# Patient Record
Sex: MALE | Race: WHITE | NOT HISPANIC OR LATINO | Employment: OTHER | URBAN - METROPOLITAN AREA
[De-identification: names, ages, dates, MRNs, and addresses within clinical notes are randomized per-mention and may not be internally consistent; named-entity substitution may affect disease eponyms.]

---

## 2023-07-26 DIAGNOSIS — N40.1 BENIGN PROSTATIC HYPERPLASIA WITH URINARY FREQUENCY: Primary | ICD-10-CM

## 2023-07-26 DIAGNOSIS — R35.0 BENIGN PROSTATIC HYPERPLASIA WITH URINARY FREQUENCY: Primary | ICD-10-CM

## 2023-07-27 RX ORDER — TAMSULOSIN HYDROCHLORIDE 0.4 MG/1
0.4 CAPSULE ORAL 2 TIMES DAILY
Qty: 180 CAPSULE | Refills: 3 | Status: SHIPPED | OUTPATIENT
Start: 2023-07-27

## 2023-08-28 PROBLEM — E87.5 HYPERKALEMIA: Status: ACTIVE | Noted: 2023-08-28

## 2023-08-28 PROBLEM — N39.0 ACUTE UTI: Status: ACTIVE | Noted: 2023-08-28

## 2023-08-28 PROBLEM — I48.91 ATRIAL FIBRILLATION (MULTI): Status: ACTIVE | Noted: 2023-08-28

## 2023-08-28 PROBLEM — J01.90 ACUTE SINUSITIS: Status: ACTIVE | Noted: 2023-08-28

## 2023-08-28 PROBLEM — M06.9 RHEUMATOID ARTHRITIS (MULTI): Status: ACTIVE | Noted: 2023-08-28

## 2023-08-28 PROBLEM — M54.31 RIGHT SCIATIC NERVE PAIN: Status: ACTIVE | Noted: 2023-08-28

## 2023-08-28 PROBLEM — C44.91 BASAL CELL CARCINOMA: Status: ACTIVE | Noted: 2023-08-28

## 2023-08-28 PROBLEM — N18.31 CKD STAGE G3A/A1, GFR 45-59 AND ALBUMIN CREATININE RATIO <30 MG/G (MULTI): Status: ACTIVE | Noted: 2023-08-28

## 2023-08-28 PROBLEM — D64.9 ANEMIA: Status: ACTIVE | Noted: 2023-08-28

## 2023-08-28 PROBLEM — N40.1 BENIGN PROSTATIC HYPERPLASIA WITH LOWER URINARY TRACT SYMPTOMS: Status: ACTIVE | Noted: 2023-08-28

## 2023-08-28 PROBLEM — E66.9 OBESITY: Status: ACTIVE | Noted: 2023-08-28

## 2023-08-28 PROBLEM — N28.9 IMPAIRED RENAL FUNCTION: Status: ACTIVE | Noted: 2023-08-28

## 2023-08-28 PROBLEM — H61.21 IMPACTED CERUMEN OF RIGHT EAR: Status: ACTIVE | Noted: 2023-08-28

## 2023-08-28 PROBLEM — I10 BENIGN ESSENTIAL HYPERTENSION: Status: ACTIVE | Noted: 2023-08-28

## 2023-08-28 PROBLEM — L02.31 ABSCESS OF BUTTOCK: Status: ACTIVE | Noted: 2023-08-28

## 2023-08-28 PROBLEM — J06.9 UPPER RESPIRATORY INFECTION: Status: ACTIVE | Noted: 2023-08-28

## 2023-08-28 PROBLEM — N40.0 BPH WITHOUT URINARY OBSTRUCTION: Status: ACTIVE | Noted: 2023-08-28

## 2023-08-28 PROBLEM — M25.511 RIGHT SHOULDER PAIN: Status: ACTIVE | Noted: 2023-08-28

## 2023-08-28 PROBLEM — R68.89 FLU-LIKE SYMPTOMS: Status: ACTIVE | Noted: 2023-08-28

## 2023-08-28 PROBLEM — E78.5 HYPERLIPIDEMIA: Status: ACTIVE | Noted: 2023-08-28

## 2023-08-28 PROBLEM — H92.01 RIGHT EAR PAIN: Status: ACTIVE | Noted: 2023-08-28

## 2023-08-28 PROBLEM — R53.83 FATIGUE: Status: ACTIVE | Noted: 2023-08-28

## 2023-08-28 PROBLEM — K64.8 BLEEDING INTERNAL HEMORRHOIDS: Status: ACTIVE | Noted: 2023-08-28

## 2023-08-28 RX ORDER — WHEAT DEXTRIN 3 G/3.5 G
2 POWDER IN PACKET (EA) ORAL DAILY
COMMUNITY
Start: 2017-06-20

## 2023-08-28 RX ORDER — PREDNISONE 1 MG/1
4 TABLET ORAL DAILY
COMMUNITY
Start: 2023-07-06

## 2023-08-28 RX ORDER — TRAMADOL HYDROCHLORIDE 50 MG/1
50 TABLET ORAL 2 TIMES DAILY PRN
COMMUNITY

## 2023-08-28 RX ORDER — METOPROLOL SUCCINATE 25 MG/1
75 TABLET, EXTENDED RELEASE ORAL 2 TIMES DAILY
COMMUNITY
Start: 2018-01-16

## 2023-08-28 RX ORDER — CHOLECALCIFEROL (VITAMIN D3) 25 MCG
1000 TABLET ORAL DAILY
COMMUNITY
Start: 2017-02-20

## 2023-08-28 RX ORDER — MECLIZINE HYDROCHLORIDE 25 MG/1
25 TABLET ORAL 2 TIMES DAILY PRN
COMMUNITY
Start: 2023-07-06

## 2023-08-28 RX ORDER — ATORVASTATIN CALCIUM 20 MG/1
1 TABLET, FILM COATED ORAL DAILY
COMMUNITY
Start: 2012-08-14 | End: 2023-12-04

## 2023-08-28 RX ORDER — PREDNISONE 2.5 MG/1
1 TABLET ORAL EVERY OTHER DAY
COMMUNITY
Start: 2013-12-20 | End: 2024-01-09 | Stop reason: ALTCHOICE

## 2023-08-28 RX ORDER — METHOTREXATE 2.5 MG/1
6 TABLET ORAL
COMMUNITY
Start: 2012-07-03

## 2023-08-28 RX ORDER — APIXABAN 5 MG/1
5 TABLET, FILM COATED ORAL 2 TIMES DAILY
COMMUNITY
Start: 2018-01-16

## 2023-08-28 RX ORDER — FOLIC ACID 1 MG/1
1 TABLET ORAL DAILY
COMMUNITY
Start: 2018-07-20

## 2023-08-28 RX ORDER — LATANOPROST 50 UG/ML
1 SOLUTION/ DROPS OPHTHALMIC NIGHTLY
COMMUNITY
Start: 2018-02-13

## 2023-08-28 RX ORDER — ENALAPRIL MALEATE 10 MG/1
1 TABLET ORAL DAILY
COMMUNITY
Start: 2014-12-03 | End: 2024-02-01

## 2023-08-28 RX ORDER — ACETAMINOPHEN 500 MG
1000 TABLET ORAL 2 TIMES DAILY
COMMUNITY
Start: 2017-02-20

## 2023-08-29 ENCOUNTER — OFFICE VISIT (OUTPATIENT)
Dept: PRIMARY CARE | Facility: CLINIC | Age: 83
End: 2023-08-29
Payer: MEDICARE

## 2023-08-29 VITALS
HEIGHT: 64 IN | OXYGEN SATURATION: 100 % | WEIGHT: 189.6 LBS | SYSTOLIC BLOOD PRESSURE: 119 MMHG | HEART RATE: 76 BPM | RESPIRATION RATE: 16 BRPM | TEMPERATURE: 98.1 F | DIASTOLIC BLOOD PRESSURE: 82 MMHG | BODY MASS INDEX: 32.37 KG/M2

## 2023-08-29 DIAGNOSIS — Z23 NEED FOR VACCINATION WITH 20-POLYVALENT PNEUMOCOCCAL CONJUGATE VACCINE: ICD-10-CM

## 2023-08-29 DIAGNOSIS — I10 BENIGN ESSENTIAL HYPERTENSION: Primary | ICD-10-CM

## 2023-08-29 DIAGNOSIS — Z12.5 SCREENING FOR PROSTATE CANCER: ICD-10-CM

## 2023-08-29 DIAGNOSIS — N18.31 CKD STAGE G3A/A1, GFR 45-59 AND ALBUMIN CREATININE RATIO <30 MG/G (MULTI): ICD-10-CM

## 2023-08-29 DIAGNOSIS — I48.20 CHRONIC ATRIAL FIBRILLATION (MULTI): ICD-10-CM

## 2023-08-29 DIAGNOSIS — R35.0 BENIGN PROSTATIC HYPERPLASIA WITH URINARY FREQUENCY: ICD-10-CM

## 2023-08-29 DIAGNOSIS — E78.2 MIXED HYPERLIPIDEMIA: ICD-10-CM

## 2023-08-29 DIAGNOSIS — N40.1 BENIGN PROSTATIC HYPERPLASIA WITH URINARY FREQUENCY: ICD-10-CM

## 2023-08-29 DIAGNOSIS — M05.79 RHEUMATOID ARTHRITIS INVOLVING MULTIPLE SITES WITH POSITIVE RHEUMATOID FACTOR (MULTI): ICD-10-CM

## 2023-08-29 PROCEDURE — 3074F SYST BP LT 130 MM HG: CPT | Performed by: INTERNAL MEDICINE

## 2023-08-29 PROCEDURE — G0009 ADMIN PNEUMOCOCCAL VACCINE: HCPCS | Performed by: INTERNAL MEDICINE

## 2023-08-29 PROCEDURE — 90677 PCV20 VACCINE IM: CPT | Performed by: INTERNAL MEDICINE

## 2023-08-29 PROCEDURE — 99214 OFFICE O/P EST MOD 30 MIN: CPT | Performed by: INTERNAL MEDICINE

## 2023-08-29 PROCEDURE — 1159F MED LIST DOCD IN RCRD: CPT | Performed by: INTERNAL MEDICINE

## 2023-08-29 PROCEDURE — 1036F TOBACCO NON-USER: CPT | Performed by: INTERNAL MEDICINE

## 2023-08-29 PROCEDURE — 3079F DIAST BP 80-89 MM HG: CPT | Performed by: INTERNAL MEDICINE

## 2023-08-29 PROCEDURE — 1160F RVW MEDS BY RX/DR IN RCRD: CPT | Performed by: INTERNAL MEDICINE

## 2023-08-29 ASSESSMENT — ENCOUNTER SYMPTOMS
DIZZINESS: 0
OCCASIONAL FEELINGS OF UNSTEADINESS: 0
SPEECH DIFFICULTY: 0
EYE REDNESS: 0
NECK STIFFNESS: 0
BRUISES/BLEEDS EASILY: 0
TREMORS: 0
CARDIOVASCULAR NEGATIVE: 1
FREQUENCY: 0
CONSTITUTIONAL NEGATIVE: 1
WHEEZING: 0
CONSTIPATION: 0
SEIZURES: 0
BLOOD IN STOOL: 0
NERVOUS/ANXIOUS: 0
COLOR CHANGE: 0
GASTROINTESTINAL NEGATIVE: 1
COUGH: 0
SLEEP DISTURBANCE: 0
SINUS PRESSURE: 0
EYE PAIN: 0
EYES NEGATIVE: 1
HEMATOLOGIC/LYMPHATIC NEGATIVE: 1
BACK PAIN: 1
PSYCHIATRIC NEGATIVE: 1
STRIDOR: 0
ENDOCRINE NEGATIVE: 1
VOICE CHANGE: 0
LOSS OF SENSATION IN FEET: 0
ADENOPATHY: 0
HEADACHES: 0
SORE THROAT: 0
NECK PAIN: 0
SINUS PAIN: 0
DYSURIA: 0
POLYPHAGIA: 0
ALLERGIC/IMMUNOLOGIC NEGATIVE: 1
TROUBLE SWALLOWING: 0
JOINT SWELLING: 0
PALPITATIONS: 0
WOUND: 0
DIARRHEA: 0
NAUSEA: 0
APPETITE CHANGE: 0
POLYDIPSIA: 0
EYE DISCHARGE: 0
DEPRESSION: 0
VOMITING: 0
ARTHRALGIAS: 1
ACTIVITY CHANGE: 0
RESPIRATORY NEGATIVE: 1
MYALGIAS: 0
FACIAL ASYMMETRY: 0
SHORTNESS OF BREATH: 0
FLANK PAIN: 0
CHEST TIGHTNESS: 0
UNEXPECTED WEIGHT CHANGE: 0
AGITATION: 0
CONFUSION: 0
ABDOMINAL PAIN: 0
NUMBNESS: 0
DIFFICULTY URINATING: 0
LIGHT-HEADEDNESS: 0
HALLUCINATIONS: 0
NEUROLOGICAL NEGATIVE: 1
WEAKNESS: 0

## 2023-08-29 ASSESSMENT — PATIENT HEALTH QUESTIONNAIRE - PHQ9
SUM OF ALL RESPONSES TO PHQ9 QUESTIONS 1 AND 2: 0
2. FEELING DOWN, DEPRESSED OR HOPELESS: NOT AT ALL
1. LITTLE INTEREST OR PLEASURE IN DOING THINGS: NOT AT ALL

## 2023-08-29 NOTE — PROGRESS NOTES
Subjective   Patient ID: Joce Galeana is a 82 y.o. male who presents for Follow-up (Patient is here for a follow up./No new concerns. ).  HPI    Patient has been feeling pretty good and has been complaint with prescribed medications.    We reviewed and discussed details of recent blood work: CBC, CMP, TSH, Lipid panel done in Feb 2023.  Results within acceptable range.    Follows with rheumatology Dr. Riley.  He remains independent and active at home and with family.  Lives with hs wife.  Noticed that he gets tired more easily but no significant complaints, denies CP, SOB.  Occasionally uses cane, not ready for a walker.    Follows with cardiologist at [facility], no changes in his medications recently.           Review of Systems   Constitutional: Negative.  Negative for activity change, appetite change and unexpected weight change.   HENT: Negative.  Negative for congestion, ear discharge, ear pain, hearing loss, mouth sores, nosebleeds, sinus pressure, sinus pain, sore throat, trouble swallowing and voice change.    Eyes: Negative.  Negative for pain, discharge, redness and visual disturbance.   Respiratory: Negative.  Negative for cough, chest tightness, shortness of breath, wheezing and stridor.    Cardiovascular: Negative.  Negative for chest pain, palpitations and leg swelling.   Gastrointestinal: Negative.  Negative for abdominal pain, blood in stool, constipation, diarrhea, nausea and vomiting.   Endocrine: Negative.  Negative for polydipsia, polyphagia and polyuria.   Genitourinary: Negative.  Negative for difficulty urinating, dysuria, flank pain, frequency and urgency.   Musculoskeletal:  Positive for arthralgias and back pain. Negative for gait problem, joint swelling, myalgias, neck pain and neck stiffness.   Skin: Negative.  Negative for color change, rash and wound.   Allergic/Immunologic: Negative.  Negative for environmental allergies, food allergies and immunocompromised state.   Neurological: Negative.  " Negative for dizziness, tremors, seizures, syncope, facial asymmetry, speech difficulty, weakness, light-headedness, numbness and headaches.   Hematological: Negative.  Negative for adenopathy. Does not bruise/bleed easily.   Psychiatric/Behavioral: Negative.  Negative for agitation, behavioral problems, confusion, hallucinations, sleep disturbance and suicidal ideas. The patient is not nervous/anxious.    All other systems reviewed and are negative.      Objective     Review of systems was performed and all systems were negative except what in HPI    /82   Pulse 76   Temp 36.7 °C (98.1 °F)   Resp 16   Ht 1.626 m (5' 4\")   Wt 86 kg (189 lb 9.6 oz)   SpO2 100%   BMI 32.54 kg/m²    Physical Exam  Vitals and nursing note reviewed.   Constitutional:       General: He is not in acute distress.     Appearance: Normal appearance.   HENT:      Head: Normocephalic and atraumatic.      Right Ear: External ear normal.      Left Ear: External ear normal.      Nose: Nose normal. No congestion or rhinorrhea.   Eyes:      General:         Right eye: No discharge.         Left eye: No discharge.      Extraocular Movements: Extraocular movements intact.      Conjunctiva/sclera: Conjunctivae normal.      Pupils: Pupils are equal, round, and reactive to light.   Cardiovascular:      Rate and Rhythm: Normal rate and regular rhythm.      Pulses: Normal pulses.      Heart sounds: Normal heart sounds. No murmur heard.     No friction rub. No gallop.   Pulmonary:      Effort: Pulmonary effort is normal. No respiratory distress.      Breath sounds: Normal breath sounds. No stridor. No wheezing, rhonchi or rales.   Chest:      Chest wall: No tenderness.   Abdominal:      General: Bowel sounds are normal.      Palpations: Abdomen is soft. There is no mass.      Tenderness: There is no abdominal tenderness. There is no guarding or rebound.   Musculoskeletal:         General: No swelling or deformity. Normal range of motion.      " Cervical back: Normal range of motion and neck supple. No rigidity or tenderness.      Right lower leg: No edema.      Left lower leg: No edema.   Lymphadenopathy:      Cervical: No cervical adenopathy.   Skin:     General: Skin is warm and dry.      Coloration: Skin is not jaundiced.      Findings: No bruising or erythema.   Neurological:      General: No focal deficit present.      Mental Status: He is alert and oriented to person, place, and time. Mental status is at baseline.      Cranial Nerves: No cranial nerve deficit.      Motor: No weakness.      Coordination: Coordination normal.      Gait: Gait normal.   Psychiatric:         Mood and Affect: Mood normal.         Behavior: Behavior normal.         Thought Content: Thought content normal.         Judgment: Judgment normal.         Assessment/Plan   Problem List Items Addressed This Visit          Cardiac and Vasculature    Atrial fibrillation (CMS/HCC)     Clinically stable. F/up with cardiology.          Relevant Medications    metoprolol succinate XL (Toprol-XL) 25 mg 24 hr tablet    Other Relevant Orders    CBC    TSH with reflex to Free T4 if abnormal    Benign essential hypertension - Primary     Controlled. Continue current treatment.          Hyperlipidemia     Continue statin.          Relevant Orders    Comprehensive Metabolic Panel    Lipid Panel       Genitourinary and Reproductive    Benign prostatic hyperplasia with lower urinary tract symptoms     Clinically stable. Continue Tamsulosin.         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 Will monitor.             Multi-system (Lupus, Sarcoid)    Rheumatoid arthritis (CMS/HCC)     Clinically stable. F/up with rheumatology Dr. Riley.           Other Visit Diagnoses       Need for vaccination with 20-polyvalent pneumococcal conjugate vaccine        Relevant Orders    Pneumococcal conjugate vaccine, 20-valent, adult (PREVNAR 20) (Completed)     Screening for prostate cancer        Relevant Orders    Prostate Specific Antigen, Screen        It was a pleasure to see you today.  I would like to remind you about importance of a healthy lifestyle in order to improve your well-being and live longer.  Try to engage in physical activities for at least 150 minutes per week.  Eat about 10 servings of fruits and vegetables daily. My advice is 2 servings of fruits and 8 servings of vegetables.  For vegetables choose at least half of them green and at least half of them fresh.  Please avoid sugar, salt, fried food and saturated fat.    I spent a total of 30 minutes on the date of service for follow up visit, which included preparing to see the patient, face-to-face patient care, completing clinical documentation, obtaining and/or reviewing separately obtained history, performing a medically appropriate examination, counseling and educating the patient/family/caregiver, ordering medications, tests, or procedures, communicating with other health care providers (not separately reported), independently interpreting results (not separately reported), communicating results to the patient/family/caregiver, and care coordination (not separately reported).      F/up in 6 months or sooner if needed.

## 2023-12-01 DIAGNOSIS — E78.2 MIXED HYPERLIPIDEMIA: Primary | ICD-10-CM

## 2023-12-04 RX ORDER — ATORVASTATIN CALCIUM 20 MG/1
20 TABLET, FILM COATED ORAL DAILY
Qty: 90 TABLET | Refills: 3 | Status: SHIPPED | OUTPATIENT
Start: 2023-12-04

## 2024-01-05 ENCOUNTER — TELEPHONE (OUTPATIENT)
Dept: PRIMARY CARE | Facility: CLINIC | Age: 84
End: 2024-01-05
Payer: MEDICARE

## 2024-01-05 NOTE — TELEPHONE ENCOUNTER
Patient states he noticed blood in his urine which started yesterday. Would like to know if you could refer him to a urologist or should he be seen by you first or go to .     Asked patient if it was alot or little blood in his urine and his answer was well I can see the blood in my urine.   Patient would like your advice.

## 2024-01-05 NOTE — TELEPHONE ENCOUNTER
chivo GUTIERREZ refused to go to ER and scheduled an appointment with Dr. Milligan on 1/9/24 at 8:00

## 2024-01-09 ENCOUNTER — OFFICE VISIT (OUTPATIENT)
Dept: PRIMARY CARE | Facility: CLINIC | Age: 84
End: 2024-01-09
Payer: MEDICARE

## 2024-01-09 ENCOUNTER — LAB (OUTPATIENT)
Dept: LAB | Facility: LAB | Age: 84
End: 2024-01-09
Payer: MEDICARE

## 2024-01-09 VITALS
SYSTOLIC BLOOD PRESSURE: 129 MMHG | HEIGHT: 64 IN | WEIGHT: 194.4 LBS | OXYGEN SATURATION: 100 % | BODY MASS INDEX: 33.19 KG/M2 | HEART RATE: 61 BPM | RESPIRATION RATE: 16 BRPM | DIASTOLIC BLOOD PRESSURE: 83 MMHG | TEMPERATURE: 97.3 F

## 2024-01-09 DIAGNOSIS — R31.0 GROSS HEMATURIA: Primary | ICD-10-CM

## 2024-01-09 DIAGNOSIS — I48.20 CHRONIC ATRIAL FIBRILLATION (MULTI): ICD-10-CM

## 2024-01-09 DIAGNOSIS — E78.2 MIXED HYPERLIPIDEMIA: ICD-10-CM

## 2024-01-09 DIAGNOSIS — Z12.5 SCREENING FOR PROSTATE CANCER: ICD-10-CM

## 2024-01-09 DIAGNOSIS — M05.79 RHEUMATOID ARTHRITIS INVOLVING MULTIPLE SITES WITH POSITIVE RHEUMATOID FACTOR (MULTI): ICD-10-CM

## 2024-01-09 DIAGNOSIS — N18.31 CKD STAGE G3A/A1, GFR 45-59 AND ALBUMIN CREATININE RATIO <30 MG/G (MULTI): ICD-10-CM

## 2024-01-09 DIAGNOSIS — N39.0 URINARY TRACT INFECTION WITHOUT HEMATURIA, SITE UNSPECIFIED: ICD-10-CM

## 2024-01-09 DIAGNOSIS — I10 BENIGN ESSENTIAL HYPERTENSION: ICD-10-CM

## 2024-01-09 LAB
APPEARANCE UR: ABNORMAL
BILIRUB UR STRIP.AUTO-MCNC: NEGATIVE MG/DL
COLOR UR: YELLOW
GLUCOSE UR STRIP.AUTO-MCNC: NEGATIVE MG/DL
KETONES UR STRIP.AUTO-MCNC: NEGATIVE MG/DL
LEUKOCYTE ESTERASE UR QL STRIP.AUTO: ABNORMAL
MUCOUS THREADS #/AREA URNS AUTO: ABNORMAL /LPF
NITRITE UR QL STRIP.AUTO: NEGATIVE
PH UR STRIP.AUTO: 5 [PH]
PROT UR STRIP.AUTO-MCNC: ABNORMAL MG/DL
RBC # UR STRIP.AUTO: ABNORMAL /UL
RBC #/AREA URNS AUTO: >20 /HPF
SP GR UR STRIP.AUTO: 1.02
UROBILINOGEN UR STRIP.AUTO-MCNC: <2 MG/DL
WBC #/AREA URNS AUTO: ABNORMAL /HPF

## 2024-01-09 PROCEDURE — 1036F TOBACCO NON-USER: CPT | Performed by: INTERNAL MEDICINE

## 2024-01-09 PROCEDURE — 87086 URINE CULTURE/COLONY COUNT: CPT

## 2024-01-09 PROCEDURE — 1159F MED LIST DOCD IN RCRD: CPT | Performed by: INTERNAL MEDICINE

## 2024-01-09 PROCEDURE — 3074F SYST BP LT 130 MM HG: CPT | Performed by: INTERNAL MEDICINE

## 2024-01-09 PROCEDURE — 1160F RVW MEDS BY RX/DR IN RCRD: CPT | Performed by: INTERNAL MEDICINE

## 2024-01-09 PROCEDURE — 81001 URINALYSIS AUTO W/SCOPE: CPT

## 2024-01-09 PROCEDURE — 99214 OFFICE O/P EST MOD 30 MIN: CPT | Performed by: INTERNAL MEDICINE

## 2024-01-09 PROCEDURE — 3079F DIAST BP 80-89 MM HG: CPT | Performed by: INTERNAL MEDICINE

## 2024-01-09 ASSESSMENT — ENCOUNTER SYMPTOMS
SINUS PAIN: 0
NECK PAIN: 0
EYE REDNESS: 0
LOSS OF SENSATION IN FEET: 0
SORE THROAT: 0
NEUROLOGICAL NEGATIVE: 1
HEMATURIA: 1
BACK PAIN: 1
HEADACHES: 0
DYSURIA: 0
GASTROINTESTINAL NEGATIVE: 1
WHEEZING: 0
SLEEP DISTURBANCE: 0
BLOOD IN STOOL: 0
RESPIRATORY NEGATIVE: 1
APPETITE CHANGE: 0
ENDOCRINE NEGATIVE: 1
EYES NEGATIVE: 1
NAUSEA: 0
NECK STIFFNESS: 0
POLYDIPSIA: 0
COUGH: 0
WOUND: 0
AGITATION: 0
MYALGIAS: 0
CONSTITUTIONAL NEGATIVE: 1
JOINT SWELLING: 0
NUMBNESS: 0
COLOR CHANGE: 0
POLYPHAGIA: 0
LIGHT-HEADEDNESS: 0
EYE PAIN: 0
HEMATOLOGIC/LYMPHATIC NEGATIVE: 1
FLANK PAIN: 0
SINUS PRESSURE: 0
NERVOUS/ANXIOUS: 0
PALPITATIONS: 0
SPEECH DIFFICULTY: 0
DEPRESSION: 0
VOMITING: 0
DIZZINESS: 0
FREQUENCY: 0
DIFFICULTY URINATING: 0
STRIDOR: 0
EYE DISCHARGE: 0
ABDOMINAL PAIN: 0
TREMORS: 0
SHORTNESS OF BREATH: 0
CONSTIPATION: 0
TROUBLE SWALLOWING: 0
HALLUCINATIONS: 0
PSYCHIATRIC NEGATIVE: 1
CONFUSION: 0
CARDIOVASCULAR NEGATIVE: 1
WEAKNESS: 0
ALLERGIC/IMMUNOLOGIC NEGATIVE: 1
ARTHRALGIAS: 1
SEIZURES: 0
BRUISES/BLEEDS EASILY: 0
FACIAL ASYMMETRY: 0
UNEXPECTED WEIGHT CHANGE: 0
OCCASIONAL FEELINGS OF UNSTEADINESS: 0
DIARRHEA: 0
VOICE CHANGE: 0
ACTIVITY CHANGE: 0
CHEST TIGHTNESS: 0
ADENOPATHY: 0

## 2024-01-09 ASSESSMENT — PATIENT HEALTH QUESTIONNAIRE - PHQ9
1. LITTLE INTEREST OR PLEASURE IN DOING THINGS: NOT AT ALL
2. FEELING DOWN, DEPRESSED OR HOPELESS: NOT AT ALL
SUM OF ALL RESPONSES TO PHQ9 QUESTIONS 1 AND 2: 0

## 2024-01-09 NOTE — PROGRESS NOTES
Subjective   Patient ID: Joce Galeana is a 83 y.o. male who presents for Blood in Urine (Patient is here for blood in his urine, did not go to ER as recommended. /Brought his own urine to take to the lab for testing. ).  Blood in Urine  Irritative symptoms do not include frequency or urgency. Pertinent negatives include no abdominal pain, dysuria, flank pain, nausea or vomiting.     82 yo patient with h/o RA, A. Fib, HTN, HLD comes for visit with complaint as above.  Patient has been feeling pretty good and has been complaint with prescribed medications.    He noted  blood in urine about a week ago which lasted 3 days and cleared. He stopped Eliquis on 4th day and has not seen blood since. He restarted Eliqius 2 days later and no more bleeding.     Denies fever, chills, no abdominal pain.  Patient is back to his regular exercise routine.     Patient had blood work done in the morning on 1/04/24 (ordered by Dr. Riley), hematuria started on 01/04/24 afternoon.  We reviewed blood work results: CBC, CMP, Vit D, ESR. Results within acceptable range.     Patient has remote h/o nephrolithiasis.     He accidentally fell from ladder in his basement (cement floor) about 6 weeks ago, hit his head, remains on Eliquis, did not seek medical attention after fall despite his family members strongly advising him to do so.   Denies any headaches, no vision changes, no focal neurological deficits.     Review of Systems   Constitutional: Negative.  Negative for activity change, appetite change and unexpected weight change.   HENT: Negative.  Negative for congestion, ear discharge, ear pain, hearing loss, mouth sores, nosebleeds, sinus pressure, sinus pain, sore throat, trouble swallowing and voice change.    Eyes: Negative.  Negative for pain, discharge, redness and visual disturbance.   Respiratory: Negative.  Negative for cough, chest tightness, shortness of breath, wheezing and stridor.    Cardiovascular: Negative.  Negative for  "chest pain, palpitations and leg swelling.   Gastrointestinal: Negative.  Negative for abdominal pain, blood in stool, constipation, diarrhea, nausea and vomiting.   Endocrine: Negative.  Negative for polydipsia, polyphagia and polyuria.   Genitourinary:  Positive for hematuria. Negative for difficulty urinating, dysuria, flank pain, frequency and urgency.   Musculoskeletal:  Positive for arthralgias and back pain. Negative for gait problem, joint swelling, myalgias, neck pain and neck stiffness.   Skin: Negative.  Negative for color change, rash and wound.   Allergic/Immunologic: Negative.  Negative for environmental allergies, food allergies and immunocompromised state.   Neurological: Negative.  Negative for dizziness, tremors, seizures, syncope, facial asymmetry, speech difficulty, weakness, light-headedness, numbness and headaches.   Hematological: Negative.  Negative for adenopathy. Does not bruise/bleed easily.   Psychiatric/Behavioral: Negative.  Negative for agitation, behavioral problems, confusion, hallucinations, sleep disturbance and suicidal ideas. The patient is not nervous/anxious.    All other systems reviewed and are negative.      Objective     Review of systems was performed and all systems were negative except what in HPI    /83   Pulse 61   Temp 36.3 °C (97.3 °F)   Resp 16   Ht 1.626 m (5' 4\")   Wt 88.2 kg (194 lb 6.4 oz)   SpO2 100%   BMI 33.37 kg/m²    Physical Exam  Vitals and nursing note reviewed.   Constitutional:       General: He is not in acute distress.     Appearance: Normal appearance.   HENT:      Head: Normocephalic and atraumatic.      Right Ear: External ear normal.      Left Ear: External ear normal.      Nose: Nose normal. No congestion or rhinorrhea.   Eyes:      General:         Right eye: No discharge.         Left eye: No discharge.      Extraocular Movements: Extraocular movements intact.      Conjunctiva/sclera: Conjunctivae normal.      Pupils: Pupils are " equal, round, and reactive to light.   Cardiovascular:      Rate and Rhythm: Normal rate and regular rhythm.      Heart sounds: Normal heart sounds. No murmur heard.     No friction rub. No gallop.   Pulmonary:      Effort: Pulmonary effort is normal. No respiratory distress.      Breath sounds: Normal breath sounds. No stridor. No wheezing, rhonchi or rales.   Chest:      Chest wall: No tenderness.   Abdominal:      General: Bowel sounds are normal.      Palpations: Abdomen is soft. There is no mass.      Tenderness: There is no abdominal tenderness. There is no guarding or rebound.   Musculoskeletal:         General: No swelling or deformity. Normal range of motion.      Cervical back: Normal range of motion and neck supple. No rigidity or tenderness.      Right lower leg: No edema.      Left lower leg: No edema.   Lymphadenopathy:      Cervical: No cervical adenopathy.   Skin:     General: Skin is warm and dry.      Coloration: Skin is not jaundiced.      Findings: No bruising or erythema.   Neurological:      General: No focal deficit present.      Mental Status: He is alert and oriented to person, place, and time. Mental status is at baseline.      Cranial Nerves: No cranial nerve deficit.      Motor: No weakness.      Coordination: Coordination normal.      Gait: Gait normal.   Psychiatric:         Mood and Affect: Mood normal.         Behavior: Behavior normal.         Thought Content: Thought content normal.         Judgment: Judgment normal.         Assessment/Plan   Problem List Items Addressed This Visit             ICD-10-CM       Cardiac and Vasculature    Atrial fibrillation (CMS/HCC) I48.91     Clinically stable. F/up with cardiology. Remains on Eliquis.         Relevant Orders    CBC    TSH with reflex to Free T4 if abnormal    Benign essential hypertension I10     Controlled. Continue current treatment.          Hyperlipidemia E78.5     Continue statin.          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 Will monitor.         Gross hematuria - Primary R31.0     Please obtain US kidneys and consult urology.         Relevant Orders    Referral to Urology    US renal complete       Multi-system (Lupus, Sarcoid)    Rheumatoid arthritis (CMS/[facility]) M06.9     Clinically stable. F/up with rheumatology.          Other Visit Diagnoses         Codes    Urinary tract infection without hematuria, site unspecified     N39.0    Relevant Orders    Urinalysis with Reflex Microscopic (Completed)    Urine Culture    Screening for prostate cancer     Z12.5    Relevant Orders    Prostate Specific Antigen, Screen          Patient was identified as a fall risk. Risk prevention instructions provided.    I spent a total of 32 minutes on the date of service for follow up visit, which included preparing to see the patient, face-to-face patient care, completing clinical documentation, obtaining and/or reviewing separately obtained history, performing a medically appropriate examination, counseling and educating the patient/family/caregiver, ordering medications, tests, or procedures, communicating with other health care providers (not separately reported), independently interpreting results (not separately reported), communicating results to the patient/family/caregiver, and care coordination (not separately reported).    F/up in 1 months or sooner if needed.

## 2024-01-10 ENCOUNTER — ANCILLARY PROCEDURE (OUTPATIENT)
Dept: RADIOLOGY | Facility: CLINIC | Age: 84
End: 2024-01-10
Payer: MEDICARE

## 2024-01-10 DIAGNOSIS — R31.0 GROSS HEMATURIA: ICD-10-CM

## 2024-01-10 LAB — BACTERIA UR CULT: NO GROWTH

## 2024-01-10 PROCEDURE — 76770 US EXAM ABDO BACK WALL COMP: CPT | Performed by: STUDENT IN AN ORGANIZED HEALTH CARE EDUCATION/TRAINING PROGRAM

## 2024-01-10 PROCEDURE — 76770 US EXAM ABDO BACK WALL COMP: CPT

## 2024-01-10 NOTE — PATIENT INSTRUCTIONS

## 2024-01-12 ENCOUNTER — TELEPHONE (OUTPATIENT)
Dept: PRIMARY CARE | Facility: CLINIC | Age: 84
End: 2024-01-12
Payer: MEDICARE

## 2024-01-12 NOTE — TELEPHONE ENCOUNTER
----- Message from Zainab Henning MD PhD sent at 1/11/2024 11:00 PM EST -----  Please call the patient regarding his abnormal result.  Recent ultrasound showed kidney stones in left kidney up to 1.4 cm in size. Please consult urology as discussed during office visit.

## 2024-01-15 ENCOUNTER — TELEPHONE (OUTPATIENT)
Dept: PRIMARY CARE | Facility: CLINIC | Age: 84
End: 2024-01-15
Payer: MEDICARE

## 2024-01-15 NOTE — TELEPHONE ENCOUNTER
----- Message from Zainab Henning MD PhD sent at 1/13/2024  7:29 PM EST -----  Please call with results:  Your recent urine test did not show urinary infection.   Blood in urine was noted. Please see urologist as scheduled.   We will discuss details during your next office visit. Please keep your next appointment as scheduled. Dr. Milligan

## 2024-02-01 DIAGNOSIS — I10 BENIGN ESSENTIAL HYPERTENSION: Primary | ICD-10-CM

## 2024-02-01 RX ORDER — ENALAPRIL MALEATE 10 MG/1
10 TABLET ORAL DAILY
Qty: 90 TABLET | Refills: 3 | Status: SHIPPED | OUTPATIENT
Start: 2024-02-01

## 2024-02-12 ENCOUNTER — APPOINTMENT (OUTPATIENT)
Dept: UROLOGY | Facility: CLINIC | Age: 84
End: 2024-02-12
Payer: MEDICARE

## 2024-02-15 ENCOUNTER — LAB (OUTPATIENT)
Dept: LAB | Facility: LAB | Age: 84
End: 2024-02-15
Payer: MEDICARE

## 2024-02-15 DIAGNOSIS — Z12.5 SCREENING FOR PROSTATE CANCER: ICD-10-CM

## 2024-02-15 DIAGNOSIS — I48.20 CHRONIC ATRIAL FIBRILLATION (MULTI): ICD-10-CM

## 2024-02-15 DIAGNOSIS — E78.2 MIXED HYPERLIPIDEMIA: ICD-10-CM

## 2024-02-15 LAB
ALBUMIN SERPL BCP-MCNC: 4.1 G/DL (ref 3.4–5)
ALP SERPL-CCNC: 49 U/L (ref 33–136)
ALT SERPL W P-5'-P-CCNC: 23 U/L (ref 10–52)
ANION GAP SERPL CALC-SCNC: 11 MMOL/L (ref 10–20)
AST SERPL W P-5'-P-CCNC: 21 U/L (ref 9–39)
BILIRUB SERPL-MCNC: 0.7 MG/DL (ref 0–1.2)
BUN SERPL-MCNC: 28 MG/DL (ref 6–23)
CALCIUM SERPL-MCNC: 9.2 MG/DL (ref 8.6–10.3)
CHLORIDE SERPL-SCNC: 106 MMOL/L (ref 98–107)
CHOLEST SERPL-MCNC: 165 MG/DL (ref 0–199)
CHOLESTEROL/HDL RATIO: 2.9
CO2 SERPL-SCNC: 29 MMOL/L (ref 21–32)
CREAT SERPL-MCNC: 1.39 MG/DL (ref 0.5–1.3)
EGFRCR SERPLBLD CKD-EPI 2021: 50 ML/MIN/1.73M*2
ERYTHROCYTE [DISTWIDTH] IN BLOOD BY AUTOMATED COUNT: 13.8 % (ref 11.5–14.5)
GLUCOSE SERPL-MCNC: 95 MG/DL (ref 74–99)
HCT VFR BLD AUTO: 43.4 % (ref 41–52)
HDLC SERPL-MCNC: 56.6 MG/DL
HGB BLD-MCNC: 14.4 G/DL (ref 13.5–17.5)
LDLC SERPL CALC-MCNC: 81 MG/DL
MCH RBC QN AUTO: 34.2 PG (ref 26–34)
MCHC RBC AUTO-ENTMCNC: 33.2 G/DL (ref 32–36)
MCV RBC AUTO: 103 FL (ref 80–100)
NON HDL CHOLESTEROL: 108 MG/DL (ref 0–149)
NRBC BLD-RTO: 0 /100 WBCS (ref 0–0)
PLATELET # BLD AUTO: 123 X10*3/UL (ref 150–450)
POTASSIUM SERPL-SCNC: 4.6 MMOL/L (ref 3.5–5.3)
PROT SERPL-MCNC: 6.5 G/DL (ref 6.4–8.2)
PSA SERPL-MCNC: 2.06 NG/ML
RBC # BLD AUTO: 4.21 X10*6/UL (ref 4.5–5.9)
SODIUM SERPL-SCNC: 141 MMOL/L (ref 136–145)
TRIGL SERPL-MCNC: 135 MG/DL (ref 0–149)
TSH SERPL-ACNC: 2.49 MIU/L (ref 0.44–3.98)
VLDL: 27 MG/DL (ref 0–40)
WBC # BLD AUTO: 5 X10*3/UL (ref 4.4–11.3)

## 2024-02-15 PROCEDURE — 85027 COMPLETE CBC AUTOMATED: CPT

## 2024-02-15 PROCEDURE — 80061 LIPID PANEL: CPT

## 2024-02-15 PROCEDURE — G0103 PSA SCREENING: HCPCS

## 2024-02-15 PROCEDURE — 80053 COMPREHEN METABOLIC PANEL: CPT

## 2024-02-15 PROCEDURE — 84443 ASSAY THYROID STIM HORMONE: CPT

## 2024-02-15 PROCEDURE — 36415 COLL VENOUS BLD VENIPUNCTURE: CPT

## 2024-02-19 ENCOUNTER — TELEPHONE (OUTPATIENT)
Dept: PRIMARY CARE | Facility: CLINIC | Age: 84
End: 2024-02-19
Payer: MEDICARE

## 2024-02-19 NOTE — TELEPHONE ENCOUNTER
----- Message from Zainab Henning MD PhD sent at 2/17/2024 12:39 PM EST -----  Please mail results:  Your recent blood work was acceptable. All results may not be within normal range but they are not clinically significant at this time and do not require change in your therapy. We will discuss details during your next office visit. Please keep your next appointment as scheduled. Dr. Milligan

## 2024-02-20 ENCOUNTER — OFFICE VISIT (OUTPATIENT)
Dept: UROLOGY | Facility: CLINIC | Age: 84
End: 2024-02-20
Payer: MEDICARE

## 2024-02-20 VITALS
WEIGHT: 194 LBS | HEART RATE: 79 BPM | DIASTOLIC BLOOD PRESSURE: 87 MMHG | BODY MASS INDEX: 33.3 KG/M2 | TEMPERATURE: 97.5 F | SYSTOLIC BLOOD PRESSURE: 136 MMHG

## 2024-02-20 DIAGNOSIS — N20.0 KIDNEY STONE: Primary | ICD-10-CM

## 2024-02-20 DIAGNOSIS — R31.0 GROSS HEMATURIA: ICD-10-CM

## 2024-02-20 LAB
POC APPEARANCE, URINE: ABNORMAL
POC BILIRUBIN, URINE: NEGATIVE
POC BLOOD, URINE: ABNORMAL
POC COLOR, URINE: YELLOW
POC GLUCOSE, URINE: NEGATIVE MG/DL
POC KETONES, URINE: NEGATIVE MG/DL
POC LEUKOCYTES, URINE: NEGATIVE
POC NITRITE,URINE: NEGATIVE
POC PH, URINE: 5.5 PH
POC PROTEIN, URINE: ABNORMAL MG/DL
POC SPECIFIC GRAVITY, URINE: 1.02
POC UROBILINOGEN, URINE: 0.2 EU/DL

## 2024-02-20 PROCEDURE — 1160F RVW MEDS BY RX/DR IN RCRD: CPT | Performed by: UROLOGY

## 2024-02-20 PROCEDURE — 99222 1ST HOSP IP/OBS MODERATE 55: CPT | Performed by: UROLOGY

## 2024-02-20 PROCEDURE — 3079F DIAST BP 80-89 MM HG: CPT | Performed by: UROLOGY

## 2024-02-20 PROCEDURE — 1157F ADVNC CARE PLAN IN RCRD: CPT | Performed by: UROLOGY

## 2024-02-20 PROCEDURE — 81003 URINALYSIS AUTO W/O SCOPE: CPT | Performed by: UROLOGY

## 2024-02-20 PROCEDURE — 1036F TOBACCO NON-USER: CPT | Performed by: UROLOGY

## 2024-02-20 PROCEDURE — 3075F SYST BP GE 130 - 139MM HG: CPT | Performed by: UROLOGY

## 2024-02-20 PROCEDURE — 1159F MED LIST DOCD IN RCRD: CPT | Performed by: UROLOGY

## 2024-02-20 NOTE — PROGRESS NOTES
Subjective   Patient ID: Joce Galeana is a 83 y.o. male new patient kindly referred by Dr. Zainab Henning who presents for NPV with Hematuria.    HPI  Patient relates that on January 4 he had acute hematuria that has continued until present. He saw his PCP who sent him for Renal US which found 2 kidney stones in the left kidney. He notes that hematuria is more notable at night. Patient denies trouble with flow, hematuria, and dysuria, urgency, and frequency. Patient thinks his kidney function is good.     Patient is on Eliquis for weak heart.     Renal US 1/10/24  IMPRESSION:  Nonobstructing left renal calculi up to 1.4 cm. No right renal  calculus or hydronephrosis in either kidney    Past Medical History  Past Medical History:   Diagnosis Date    Bradycardia, unspecified 02/08/2019    Persistent sinus bradycardia    Other conditions influencing health status     Skin Cancer    Personal history of other diseases of the circulatory system     History of hypertension    Personal history of other diseases of the circulatory system     History of cardiac disorder    Rheumatoid arthritis, unspecified (CMS/[facility])     Rheumatoid arthritis        Surgical History  Past Surgical History:   Procedure Laterality Date    COLONOSCOPY  12/03/2014    Complete Colonoscopy    ROTATOR CUFF REPAIR  12/20/2013    Rotator Cuff Repair         Social History  He reports that he has never smoked. He has never been exposed to tobacco smoke. He has never used smokeless tobacco. No history on file for alcohol use and drug use.    Family History  Family History   Problem Relation Name Age of Onset    Hypertension Mother      Heart disease Mother      Skin cancer Mother      Hypertension Father         Medications    Current Outpatient Medications:     acetaminophen (Tylenol Extra Strength) 500 mg tablet, Take by mouth., Disp: , Rfl:     atorvastatin (Lipitor) 20 mg tablet, TAKE 1 TABLET BY MOUTH DAILY, Disp: 90 tablet, Rfl: 3     cholecalciferol (Vitamin D-3) 25 MCG (1000 UT) tablet, Take by mouth., Disp: , Rfl:     Eliquis 5 mg tablet, Take by mouth., Disp: , Rfl:     enalapril (Vasotec) 10 mg tablet, TAKE 1 TABLET BY MOUTH DAILY, Disp: 90 tablet, Rfl: 3    folic acid (Folvite) 1 mg tablet, Take by mouth., Disp: , Rfl:     latanoprost (Xalatan) 0.005 % ophthalmic solution, Administer into affected eye(s)., Disp: , Rfl:     meclizine (Antivert) 25 mg tablet, TAKE 2 TABLETS BY MOUTH EVERY 12 HOURS AS NEEDED, Disp: , Rfl:     methotrexate (Trexall) 2.5 mg tablet, Take by mouth., Disp: , Rfl:     metoprolol succinate XL (Toprol-XL) 25 mg 24 hr tablet, Take 2 tablets (50 mg) by mouth 2 times a day., Disp: , Rfl:     predniSONE (Deltasone) 1 mg tablet, TAKE 4 TABLETS BY MOUTH EVERY DAY WITH FOOD OR MILK, Disp: , Rfl:     tamsulosin (Flomax) 0.4 mg 24 hr capsule, TAKE 1 CAPSULE BY MOUTH  TWICE DAILY, Disp: 180 capsule, Rfl: 3    traMADol (Ultram) 50 mg tablet, Take 1 tablet (50 mg) by mouth 2 times a day as needed., Disp: , Rfl:     wheat dextrin (Benefiber Clear SF, dextrin,) 3 gram/3.5 gram powder in packet, Take by mouth., Disp: , Rfl:      Allergies  Patient has no known allergies.     Review of Systems  A 12 system review was completed and is negative with the exception of those signs and symptoms noted in the history of present illness.    Objective   Physical Exam  General: in NAD, appears stated age  Head: normocephalic, atraumatic  Neck: supple; trachea is midline  Respiratory: normal effort, no use of accessory muscles  Cardiovascular: no peripheral edema  Abdomen: soft, nondistended, nontender, no rebound or guarding, no organomegaly, no CVA tenderness, no hernia  Lymphatic: no lymphadenopathy noted  Skin: normal turgor, no rashes  Neurologic: grossly intact, oriented to person/place/time  Psychiatric: mode and affect appropriate    Assessment/Plan   Problem List Items Addressed This Visit             ICD-10-CM       Genitourinary and  Reproductive    Gross hematuria R31.0    Relevant Orders    POCT UA Automated manually resulted (Completed)    CT urography w 3D volume rendered imaging     Recent CBC is unremarkable. With the US findings of 2 kidney stones we will order CT Urogram and schedule ureteroscopy or PCNL based on CT result.     Scribe Attestation  By signing my name below, I, Ct Thomas   attest that this documentation has been prepared under the direction and in the presence of Lamberto Matthew MD.

## 2024-02-21 ENCOUNTER — HOSPITAL ENCOUNTER (OUTPATIENT)
Facility: HOSPITAL | Age: 84
Setting detail: OBSERVATION
Discharge: HOME | End: 2024-02-22
Attending: EMERGENCY MEDICINE | Admitting: STUDENT IN AN ORGANIZED HEALTH CARE EDUCATION/TRAINING PROGRAM
Payer: MEDICARE

## 2024-02-21 ENCOUNTER — APPOINTMENT (OUTPATIENT)
Dept: CARDIOLOGY | Facility: HOSPITAL | Age: 84
End: 2024-02-21
Payer: MEDICARE

## 2024-02-21 ENCOUNTER — APPOINTMENT (OUTPATIENT)
Dept: RADIOLOGY | Facility: HOSPITAL | Age: 84
End: 2024-02-21
Payer: MEDICARE

## 2024-02-21 DIAGNOSIS — N18.31 CKD STAGE G3A/A1, GFR 45-59 AND ALBUMIN CREATININE RATIO <30 MG/G (MULTI): ICD-10-CM

## 2024-02-21 DIAGNOSIS — N20.0 NEPHROLITHIASIS: ICD-10-CM

## 2024-02-21 DIAGNOSIS — N13.2 HYDRONEPHROSIS WITH OBSTRUCTING CALCULUS: ICD-10-CM

## 2024-02-21 DIAGNOSIS — N20.0 LEFT NEPHROLITHIASIS: Primary | ICD-10-CM

## 2024-02-21 LAB
ALBUMIN SERPL BCP-MCNC: 4.2 G/DL (ref 3.4–5)
ALP SERPL-CCNC: 49 U/L (ref 33–136)
ALT SERPL W P-5'-P-CCNC: 23 U/L (ref 10–52)
ANION GAP SERPL CALC-SCNC: 12 MMOL/L (ref 10–20)
APPEARANCE UR: ABNORMAL
APTT PPP: 31 SECONDS (ref 27–38)
AST SERPL W P-5'-P-CCNC: 24 U/L (ref 9–39)
BASOPHILS # BLD AUTO: 0.02 X10*3/UL (ref 0–0.1)
BASOPHILS NFR BLD AUTO: 0.2 %
BILIRUB SERPL-MCNC: 0.9 MG/DL (ref 0–1.2)
BILIRUB UR STRIP.AUTO-MCNC: NEGATIVE MG/DL
BUN SERPL-MCNC: 28 MG/DL (ref 6–23)
CALCIUM SERPL-MCNC: 9.6 MG/DL (ref 8.6–10.3)
CHLORIDE SERPL-SCNC: 104 MMOL/L (ref 98–107)
CO2 SERPL-SCNC: 29 MMOL/L (ref 21–32)
COLOR UR: YELLOW
CREAT SERPL-MCNC: 1.22 MG/DL (ref 0.5–1.3)
EGFRCR SERPLBLD CKD-EPI 2021: 59 ML/MIN/1.73M*2
EOSINOPHIL # BLD AUTO: 0.03 X10*3/UL (ref 0–0.4)
EOSINOPHIL NFR BLD AUTO: 0.3 %
ERYTHROCYTE [DISTWIDTH] IN BLOOD BY AUTOMATED COUNT: 14.1 % (ref 11.5–14.5)
GLUCOSE SERPL-MCNC: 99 MG/DL (ref 74–99)
GLUCOSE UR STRIP.AUTO-MCNC: NEGATIVE MG/DL
HCT VFR BLD AUTO: 45.8 % (ref 41–52)
HGB BLD-MCNC: 14.8 G/DL (ref 13.5–17.5)
HOLD SPECIMEN: NORMAL
IMM GRANULOCYTES # BLD AUTO: 0.03 X10*3/UL (ref 0–0.5)
IMM GRANULOCYTES NFR BLD AUTO: 0.3 % (ref 0–0.9)
INR PPP: 1.2 (ref 0.9–1.1)
KETONES UR STRIP.AUTO-MCNC: NEGATIVE MG/DL
LEUKOCYTE ESTERASE UR QL STRIP.AUTO: NEGATIVE
LYMPHOCYTES # BLD AUTO: 0.99 X10*3/UL (ref 0.8–3)
LYMPHOCYTES NFR BLD AUTO: 10.6 %
MCH RBC QN AUTO: 33.7 PG (ref 26–34)
MCHC RBC AUTO-ENTMCNC: 32.3 G/DL (ref 32–36)
MCV RBC AUTO: 104 FL (ref 80–100)
MONOCYTES # BLD AUTO: 1.48 X10*3/UL (ref 0.05–0.8)
MONOCYTES NFR BLD AUTO: 15.8 %
NEUTROPHILS # BLD AUTO: 6.81 X10*3/UL (ref 1.6–5.5)
NEUTROPHILS NFR BLD AUTO: 72.8 %
NITRITE UR QL STRIP.AUTO: NEGATIVE
NRBC BLD-RTO: 0 /100 WBCS (ref 0–0)
PH UR STRIP.AUTO: 5 [PH]
PLATELET # BLD AUTO: 124 X10*3/UL (ref 150–450)
POTASSIUM SERPL-SCNC: 3.7 MMOL/L (ref 3.5–5.3)
PROT SERPL-MCNC: 7.3 G/DL (ref 6.4–8.2)
PROT UR STRIP.AUTO-MCNC: ABNORMAL MG/DL
PROTHROMBIN TIME: 13.2 SECONDS (ref 9.8–12.8)
RBC # BLD AUTO: 4.39 X10*6/UL (ref 4.5–5.9)
RBC # UR STRIP.AUTO: ABNORMAL /UL
RBC #/AREA URNS AUTO: >20 /HPF
SODIUM SERPL-SCNC: 141 MMOL/L (ref 136–145)
SP GR UR STRIP.AUTO: 1.02
UROBILINOGEN UR STRIP.AUTO-MCNC: <2 MG/DL
WBC # BLD AUTO: 9.4 X10*3/UL (ref 4.4–11.3)
WBC #/AREA URNS AUTO: ABNORMAL /HPF

## 2024-02-21 PROCEDURE — G0378 HOSPITAL OBSERVATION PER HR: HCPCS

## 2024-02-21 PROCEDURE — 2500000002 HC RX 250 W HCPCS SELF ADMINISTERED DRUGS (ALT 637 FOR MEDICARE OP, ALT 636 FOR OP/ED): Mod: MUE

## 2024-02-21 PROCEDURE — 2500000001 HC RX 250 WO HCPCS SELF ADMINISTERED DRUGS (ALT 637 FOR MEDICARE OP)

## 2024-02-21 PROCEDURE — 99221 1ST HOSP IP/OBS SF/LOW 40: CPT | Performed by: UROLOGY

## 2024-02-21 PROCEDURE — 76377 3D RENDER W/INTRP POSTPROCES: CPT

## 2024-02-21 PROCEDURE — 96375 TX/PRO/DX INJ NEW DRUG ADDON: CPT

## 2024-02-21 PROCEDURE — 2500000004 HC RX 250 GENERAL PHARMACY W/ HCPCS (ALT 636 FOR OP/ED)

## 2024-02-21 PROCEDURE — 99285 EMERGENCY DEPT VISIT HI MDM: CPT | Mod: 25

## 2024-02-21 PROCEDURE — 2550000001 HC RX 255 CONTRASTS: Performed by: EMERGENCY MEDICINE

## 2024-02-21 PROCEDURE — 93005 ELECTROCARDIOGRAM TRACING: CPT

## 2024-02-21 PROCEDURE — 36415 COLL VENOUS BLD VENIPUNCTURE: CPT | Performed by: EMERGENCY MEDICINE

## 2024-02-21 PROCEDURE — 81001 URINALYSIS AUTO W/SCOPE: CPT | Performed by: EMERGENCY MEDICINE

## 2024-02-21 PROCEDURE — 85610 PROTHROMBIN TIME: CPT | Performed by: EMERGENCY MEDICINE

## 2024-02-21 PROCEDURE — 2500000004 HC RX 250 GENERAL PHARMACY W/ HCPCS (ALT 636 FOR OP/ED): Performed by: EMERGENCY MEDICINE

## 2024-02-21 PROCEDURE — 99285 EMERGENCY DEPT VISIT HI MDM: CPT | Performed by: EMERGENCY MEDICINE

## 2024-02-21 PROCEDURE — 85025 COMPLETE CBC W/AUTO DIFF WBC: CPT | Performed by: EMERGENCY MEDICINE

## 2024-02-21 PROCEDURE — 80053 COMPREHEN METABOLIC PANEL: CPT | Performed by: EMERGENCY MEDICINE

## 2024-02-21 RX ORDER — FOLIC ACID 1 MG/1
1 TABLET ORAL DAILY
Status: DISCONTINUED | OUTPATIENT
Start: 2024-02-21 | End: 2024-02-22 | Stop reason: HOSPADM

## 2024-02-21 RX ORDER — MORPHINE SULFATE 4 MG/ML
4 INJECTION, SOLUTION INTRAMUSCULAR; INTRAVENOUS ONCE
Status: COMPLETED | OUTPATIENT
Start: 2024-02-21 | End: 2024-02-21

## 2024-02-21 RX ORDER — ONDANSETRON HYDROCHLORIDE 2 MG/ML
4 INJECTION, SOLUTION INTRAVENOUS ONCE
Status: COMPLETED | OUTPATIENT
Start: 2024-02-21 | End: 2024-02-21

## 2024-02-21 RX ORDER — DICLOFENAC SODIUM 10 MG/G
4 GEL TOPICAL 2 TIMES DAILY PRN
COMMUNITY

## 2024-02-21 RX ORDER — MENTHOL 40 MG/ML
1 GEL TOPICAL 2 TIMES DAILY PRN
COMMUNITY

## 2024-02-21 RX ORDER — TAMSULOSIN HYDROCHLORIDE 0.4 MG/1
0.4 CAPSULE ORAL NIGHTLY
Status: DISCONTINUED | OUTPATIENT
Start: 2024-02-21 | End: 2024-02-22 | Stop reason: HOSPADM

## 2024-02-21 RX ORDER — PREDNISONE 1 MG/1
4 TABLET ORAL DAILY
Status: DISCONTINUED | OUTPATIENT
Start: 2024-02-21 | End: 2024-02-22 | Stop reason: HOSPADM

## 2024-02-21 RX ORDER — ONDANSETRON HYDROCHLORIDE 2 MG/ML
4 INJECTION, SOLUTION INTRAVENOUS EVERY 6 HOURS PRN
Status: DISCONTINUED | OUTPATIENT
Start: 2024-02-21 | End: 2024-02-22 | Stop reason: HOSPADM

## 2024-02-21 RX ORDER — ATORVASTATIN CALCIUM 20 MG/1
20 TABLET, FILM COATED ORAL NIGHTLY
Status: DISCONTINUED | OUTPATIENT
Start: 2024-02-21 | End: 2024-02-22 | Stop reason: HOSPADM

## 2024-02-21 RX ORDER — SODIUM CHLORIDE, SODIUM LACTATE, POTASSIUM CHLORIDE, CALCIUM CHLORIDE 600; 310; 30; 20 MG/100ML; MG/100ML; MG/100ML; MG/100ML
75 INJECTION, SOLUTION INTRAVENOUS CONTINUOUS
Status: ACTIVE | OUTPATIENT
Start: 2024-02-21 | End: 2024-02-22

## 2024-02-21 RX ORDER — ACETAMINOPHEN 500 MG
500 TABLET ORAL NIGHTLY
Status: ON HOLD | COMMUNITY
End: 2024-04-08 | Stop reason: SDUPTHER

## 2024-02-21 RX ORDER — KETOROLAC TROMETHAMINE 15 MG/ML
15 INJECTION, SOLUTION INTRAMUSCULAR; INTRAVENOUS ONCE
Status: COMPLETED | OUTPATIENT
Start: 2024-02-21 | End: 2024-02-21

## 2024-02-21 RX ADMIN — MORPHINE SULFATE 4 MG: 4 INJECTION, SOLUTION INTRAMUSCULAR; INTRAVENOUS at 10:26

## 2024-02-21 RX ADMIN — HYDROMORPHONE HYDROCHLORIDE 0.2 MG: 0.2 INJECTION, SOLUTION INTRAMUSCULAR; INTRAVENOUS; SUBCUTANEOUS at 15:35

## 2024-02-21 RX ADMIN — FOLIC ACID 1 MG: 1 TABLET ORAL at 15:35

## 2024-02-21 RX ADMIN — KETOROLAC TROMETHAMINE 15 MG: 15 INJECTION, SOLUTION INTRAMUSCULAR; INTRAVENOUS at 10:26

## 2024-02-21 RX ADMIN — TAMSULOSIN HYDROCHLORIDE 0.4 MG: 0.4 CAPSULE ORAL at 20:24

## 2024-02-21 RX ADMIN — ATORVASTATIN CALCIUM 20 MG: 20 TABLET, FILM COATED ORAL at 20:24

## 2024-02-21 RX ADMIN — IOHEXOL 75 ML: 350 INJECTION, SOLUTION INTRAVENOUS at 12:53

## 2024-02-21 RX ADMIN — PREDNISONE 4 MG: 1 TABLET ORAL at 15:41

## 2024-02-21 RX ADMIN — SODIUM CHLORIDE, POTASSIUM CHLORIDE, SODIUM LACTATE AND CALCIUM CHLORIDE 75 ML/HR: 600; 310; 30; 20 INJECTION, SOLUTION INTRAVENOUS at 15:47

## 2024-02-21 RX ADMIN — ONDANSETRON 4 MG: 2 INJECTION INTRAMUSCULAR; INTRAVENOUS at 10:26

## 2024-02-21 RX ADMIN — METOPROLOL SUCCINATE 75 MG: 50 TABLET, EXTENDED RELEASE ORAL at 20:24

## 2024-02-21 SDOH — SOCIAL STABILITY: SOCIAL INSECURITY: DO YOU FEEL UNSAFE GOING BACK TO THE PLACE WHERE YOU ARE LIVING?: NO

## 2024-02-21 SDOH — SOCIAL STABILITY: SOCIAL INSECURITY: DO YOU FEEL ANYONE HAS EXPLOITED OR TAKEN ADVANTAGE OF YOU FINANCIALLY OR OF YOUR PERSONAL PROPERTY?: NO

## 2024-02-21 SDOH — SOCIAL STABILITY: SOCIAL INSECURITY: ARE YOU OR HAVE YOU BEEN THREATENED OR ABUSED PHYSICALLY, EMOTIONALLY, OR SEXUALLY BY ANYONE?: NO

## 2024-02-21 SDOH — SOCIAL STABILITY: SOCIAL INSECURITY: ARE THERE ANY APPARENT SIGNS OF INJURIES/BEHAVIORS THAT COULD BE RELATED TO ABUSE/NEGLECT?: NO

## 2024-02-21 SDOH — SOCIAL STABILITY: SOCIAL INSECURITY: ABUSE: ADULT

## 2024-02-21 SDOH — SOCIAL STABILITY: SOCIAL INSECURITY: DOES ANYONE TRY TO KEEP YOU FROM HAVING/CONTACTING OTHER FRIENDS OR DOING THINGS OUTSIDE YOUR HOME?: NO

## 2024-02-21 SDOH — SOCIAL STABILITY: SOCIAL INSECURITY: HAVE YOU HAD THOUGHTS OF HARMING ANYONE ELSE?: NO

## 2024-02-21 SDOH — SOCIAL STABILITY: SOCIAL INSECURITY: HAS ANYONE EVER THREATENED TO HURT YOUR FAMILY OR YOUR PETS?: NO

## 2024-02-21 ASSESSMENT — COGNITIVE AND FUNCTIONAL STATUS - GENERAL
TOILETING: A LITTLE
TURNING FROM BACK TO SIDE WHILE IN FLAT BAD: A LITTLE
WALKING IN HOSPITAL ROOM: A LITTLE
STANDING UP FROM CHAIR USING ARMS: A LITTLE
DAILY ACTIVITIY SCORE: 23
PATIENT BASELINE BEDBOUND: NO
CLIMB 3 TO 5 STEPS WITH RAILING: A LITTLE
MOVING TO AND FROM BED TO CHAIR: A LITTLE
WALKING IN HOSPITAL ROOM: A LITTLE
DAILY ACTIVITIY SCORE: 23
MOVING TO AND FROM BED TO CHAIR: A LITTLE
MOVING FROM LYING ON BACK TO SITTING ON SIDE OF FLAT BED WITH BEDRAILS: A LITTLE
MOBILITY SCORE: 18
MOVING FROM LYING ON BACK TO SITTING ON SIDE OF FLAT BED WITH BEDRAILS: A LITTLE
MOBILITY SCORE: 18
TURNING FROM BACK TO SIDE WHILE IN FLAT BAD: A LITTLE
STANDING UP FROM CHAIR USING ARMS: A LITTLE
TOILETING: A LITTLE
CLIMB 3 TO 5 STEPS WITH RAILING: A LITTLE

## 2024-02-21 ASSESSMENT — LIFESTYLE VARIABLES
AUDIT-C TOTAL SCORE: 0
AUDIT-C TOTAL SCORE: 0
HOW OFTEN DO YOU HAVE A DRINK CONTAINING ALCOHOL: NEVER
SKIP TO QUESTIONS 9-10: 1
HOW MANY STANDARD DRINKS CONTAINING ALCOHOL DO YOU HAVE ON A TYPICAL DAY: PATIENT DOES NOT DRINK
EVER HAD A DRINK FIRST THING IN THE MORNING TO STEADY YOUR NERVES TO GET RID OF A HANGOVER: NO
HAVE PEOPLE ANNOYED YOU BY CRITICIZING YOUR DRINKING: NO
HAVE YOU EVER FELT YOU SHOULD CUT DOWN ON YOUR DRINKING: NO
HOW OFTEN DO YOU HAVE 6 OR MORE DRINKS ON ONE OCCASION: NEVER
EVER FELT BAD OR GUILTY ABOUT YOUR DRINKING: NO

## 2024-02-21 ASSESSMENT — PATIENT HEALTH QUESTIONNAIRE - PHQ9
2. FEELING DOWN, DEPRESSED OR HOPELESS: NOT AT ALL
1. LITTLE INTEREST OR PLEASURE IN DOING THINGS: NOT AT ALL
SUM OF ALL RESPONSES TO PHQ9 QUESTIONS 1 & 2: 0

## 2024-02-21 ASSESSMENT — ACTIVITIES OF DAILY LIVING (ADL)
LACK_OF_TRANSPORTATION: NO
TOILETING: INDEPENDENT
GROOMING: INDEPENDENT
BATHING: INDEPENDENT
ASSISTIVE_DEVICE: EYEGLASSES
DRESSING YOURSELF: INDEPENDENT
ADEQUATE_TO_COMPLETE_ADL: YES
WALKS IN HOME: INDEPENDENT
JUDGMENT_ADEQUATE_SAFELY_COMPLETE_DAILY_ACTIVITIES: YES
FEEDING YOURSELF: INDEPENDENT
HEARING - RIGHT EAR: FUNCTIONAL
PATIENT'S MEMORY ADEQUATE TO SAFELY COMPLETE DAILY ACTIVITIES?: YES
HEARING - LEFT EAR: FUNCTIONAL

## 2024-02-21 ASSESSMENT — PAIN DESCRIPTION - ORIENTATION: ORIENTATION: LEFT

## 2024-02-21 ASSESSMENT — COLUMBIA-SUICIDE SEVERITY RATING SCALE - C-SSRS
6. HAVE YOU EVER DONE ANYTHING, STARTED TO DO ANYTHING, OR PREPARED TO DO ANYTHING TO END YOUR LIFE?: NO
2. HAVE YOU ACTUALLY HAD ANY THOUGHTS OF KILLING YOURSELF?: NO
1. IN THE PAST MONTH, HAVE YOU WISHED YOU WERE DEAD OR WISHED YOU COULD GO TO SLEEP AND NOT WAKE UP?: NO

## 2024-02-21 ASSESSMENT — PAIN DESCRIPTION - PAIN TYPE: TYPE: ACUTE PAIN

## 2024-02-21 ASSESSMENT — PAIN - FUNCTIONAL ASSESSMENT: PAIN_FUNCTIONAL_ASSESSMENT: 0-10

## 2024-02-21 ASSESSMENT — PAIN SCALES - GENERAL
PAINLEVEL_OUTOF10: 0 - NO PAIN
PAINLEVEL_OUTOF10: 8
PAINLEVEL_OUTOF10: 8

## 2024-02-21 ASSESSMENT — PAIN DESCRIPTION - LOCATION: LOCATION: ABDOMEN

## 2024-02-21 NOTE — CONSULTS
[facility]  Consult Note    Joce Galeana  [id number]  1940 (83 y.o.)    Reason for Consult: Nephrolithiasis    Impression & Recommendations  83-year-old male with history of nephrolithiasis, CKD 3, rheumatoid arthritis on methotrexate, A-fib on Eliquis, and BPH who presented to the ER with left flank pain, found to have a large 2.1 cm left proximal ureteral stone with hydronephrosis.  Labs demonstrated no white count, negative UA, and creatinine within baseline.  Given his symptoms, we will plan to attempt stent placement tomorrow in the OR.    -Please make patient n.p.o. at midnight, ok for diet for today  -Will obtain consent in preop  -Please alert urology if patient's clinical status changes      Patient assessment and plan d/w Dr. Keller.    Karime Gonzalez MD, PGY-5  Urology  Consult Uro: [id number]  Pediatrics Uro: [id number]   After 5pm and Weekends: [id number]    Attending attestation  Patient seen and examined on 2/22/2024.  In brief, patient has a 2.1 cm left proximal ureteral/UPJ stone with hydronephrosis and persistent flank pain.  No indication of urinary tract infection and creatinine is at baseline.  In light of his poor pain control, we are opting to proceed with left ureteral stent placement with subsequent outpatient treatment of his stone.  Patient will be treated today by Dr. Matthew.  Sirena for discharge following.    -----------------------------------------------------------------------------------------------    Subjective    HPI:    This is an 83-year-old male with past medical history significant for CKD 3, rheumatoid arthritis on methotrexate, A-fib on Eliquis, and BPH who presented to the ER with left flank pain that radiated down to the pelvis that started at 4 AM today.  He denied any fever, chills, but reported some nausea.  He was able to tolerate p.o. intake prior to presenting to the ER.  He does have a history of stones in the past, reporting that he required what sounds like  shockwave lithotripsy about 25 years ago.  He also spontaneously passed stones about 30 years ago.      Of note patient saw Dr. Matthew yesterday for gross hematuria evaluation, plan at the time was to obtain CT urogram.  CT was done in the ER, demonstrating a 2.1 cm left proximal ureteral stone with mild hydronephrosis with several other nonobstructing stones.  Labs at presentation showed creatinine of 1.22 (which appeared within his baseline), no white count, UA negative for infection.  At the time of evaluation by urology, patient was comfortable and reported that the medication was helping with his pain and nausea.    ROS:  As per HPI, 10 point comprehensive review of systems reviewed and otherwise negative.    No Known Allergies    Past Medical History:   Diagnosis Date    Bradycardia, unspecified 02/08/2019    Persistent sinus bradycardia    Other conditions influencing health status     Skin Cancer    Personal history of other diseases of the circulatory system     History of hypertension    Personal history of other diseases of the circulatory system     History of cardiac disorder    Rheumatoid arthritis, unspecified (CMS/HCC)     Rheumatoid arthritis     Past Surgical History:   Procedure Laterality Date    COLONOSCOPY  12/03/2014    Complete Colonoscopy    ROTATOR CUFF REPAIR  12/20/2013    Rotator Cuff Repair     Social History     Tobacco Use    Smoking status: Never     Passive exposure: Never    Smokeless tobacco: Never   Vaping Use    Vaping Use: Never used   Substance Use Topics    Alcohol use: Not Currently    Drug use: Never     Family History   Problem Relation Name Age of Onset    Hypertension Mother      Heart disease Mother      Skin cancer Mother      Hypertension Father       Current Facility-Administered Medications   Medication Dose Route Frequency Provider Last Rate Last Admin    atorvastatin (Lipitor) tablet 20 mg  20 mg oral Nightly Laz Mueller DO        folic acid (Folvite) tablet  "1 mg  1 mg oral Daily Laz Sabljic, DO   1 mg at 02/21/24 1535    HYDROmorphone (Dilaudid) injection 0.5 mg  0.5 mg intravenous q4h PRN Laz Salazarljic, DO        HYDROmorphone PF (Dilaudid) injection 0.2 mg  0.2 mg intravenous q4h PRN Laz Salazarljic, DO   0.2 mg at 02/21/24 1535    lactated Ringer's infusion  75 mL/hr intravenous Continuous Laz Salazarljic, DO 75 mL/hr at 02/21/24 1547 75 mL/hr at 02/21/24 1547    metoprolol succinate XL (Toprol-XL) 24 hr tablet 75 mg  75 mg oral BID Laz Lovettc, DO        ondansetron (Zofran) injection 4 mg  4 mg intravenous q6h PRN Laz Lovettc, DO        predniSONE (Deltasone) tablet 4 mg  4 mg oral Daily Laz Lovettc, DO   4 mg at 02/21/24 1541    tamsulosin (Flomax) 24 hr capsule 0.4 mg  0.4 mg oral Nightly Laz Lovettc, DO           Objective    Vitals  Vitals:    02/21/24 1300 02/21/24 1430 02/21/24 1500 02/21/24 1600   BP: 130/78 126/73  147/88   BP Location:    Left arm   Patient Position:    Lying   Pulse: 99 84  80   Resp: 20 (!) 21 17   Temp:    36.4 °C (97.5 °F)   TempSrc:    Temporal   SpO2: 96% 95%  96%   Weight:   85.8 kg (189 lb 2.5 oz)    Height:   1.626 m (5' 4\")      No intake or output data in the 24 hours ending 02/21/24 1744    Physical Exam    Gen: NAD  Neuro: AOx3  Skin: Non-jaundiced  HEENT:  NCAT, anicteric sclera  CV:  Regular  Resp:  Non-labored breathing  Abd:  Soft, non-distended, no guarding/rebound/peritonitis  : no flank tenderness  Extremities: No cyanosis or clubbing  Lymph: No lower extremity edema    Labs & Test Results  No intake/output data recorded.    Lab Results   Component Value Date    WBC 9.4 02/21/2024    HGB 14.8 02/21/2024    HCT 45.8 02/21/2024     (H) 02/21/2024     (L) 02/21/2024      Lab Results   Component Value Date    GLUCOSE 99 02/21/2024    CALCIUM 9.6 02/21/2024     02/21/2024    K 3.7 02/21/2024    CO2 29 02/21/2024     02/21/2024    BUN 28 (H) 02/21/2024    CREATININE 1.22 02/21/2024 "         Imaging  CTU 2/21/2024  21 mm proximal left ureteral stone causing moderate hydronephrosis.  A few additional intrarenal stones bilaterally measuring up to 14mm  Multiple benign cortical cysts bilaterally

## 2024-02-21 NOTE — ED PROVIDER NOTES
EMERGENCY DEPARTMENT ENCOUNTER      Pt Name: Joce Galeana  MRN: [id number]  Birthdate 1940  Date of evaluation: 2/21/2024  Provider: Ramiro Mac DO    CHIEF COMPLAINT       Chief Complaint   Patient presents with    Flank Pain     Left.      HISTORY OF PRESENT ILLNESS    Joce Galeana is a 83 y.o. year old male who presents to the ER for left flank pain. Endorses daily hematuria. Pain exacerbated today, has been ongoing 4-5 weeks. + dysuria. + pelvic pain.  + nausea, - fevers, - chest pain, - vomiting, - stool changes. Took 2 naproxen this morning, last PO 0700.   Saw josy yesterday, wanted imaging and follow up in march.   Kidney stones since January  PMH kidney stones, RA on prednisone, PMR, HTN, Afib on eliquis  PSH: none  NKDA  No tobacco, no alcohol, no drug use    Last eliquis last night     PAST MEDICAL HISTORY     Past Medical History:   Diagnosis Date    Bradycardia, unspecified 02/08/2019    Persistent sinus bradycardia    Other conditions influencing health status     Skin Cancer    Personal history of other diseases of the circulatory system     History of hypertension    Personal history of other diseases of the circulatory system     History of cardiac disorder    Rheumatoid arthritis, unspecified (CMS/HCC)     Rheumatoid arthritis     CURRENT MEDICATIONS       Discharge Medication List as of 2/22/2024  3:19 PM        CONTINUE these medications which have NOT CHANGED    Details   !! acetaminophen (Tylenol Extra Strength) 500 mg tablet Take 2 tablets (1,000 mg) by mouth 2 times a day. Morning and evening, Starting Mon 2/20/2017, Historical Med      !! acetaminophen (Tylenol) 500 mg tablet Take 1 tablet (500 mg) by mouth once daily at bedtime., Historical Med      atorvastatin (Lipitor) 20 mg tablet TAKE 1 TABLET BY MOUTH DAILY, Starting Mon 12/4/2023, Normal      cholecalciferol (Vitamin D-3) 25 MCG (1000 UT) tablet Take 1 tablet (1,000 Units) by mouth once daily., Starting Mon  2/20/2017, Historical Med      diclofenac sodium (Voltaren) 1 % gel Apply 4.5 inches (4 g) topically 2 times a day as needed (pain)., Historical Med      Eliquis 5 mg tablet Take 1 tablet (5 mg) by mouth 2 times a day., Starting Tue 1/16/2018, Historical Med      enalapril (Vasotec) 10 mg tablet TAKE 1 TABLET BY MOUTH DAILY, Starting Thu 2/1/2024, Normal      folic acid (Folvite) 1 mg tablet Take 1 tablet (1 mg) by mouth once daily., Starting Fri 7/20/2018, Historical Med      latanoprost (Xalatan) 0.005 % ophthalmic solution Administer 1 drop into both eyes once daily at bedtime., Starting Tue 2/13/2018, Historical Med      meclizine (Antivert) 25 mg tablet Take 1 tablet (25 mg) by mouth 2 times a day as needed for dizziness., Historical Med      menthol (Biofreeze, menthol,) 4 % gel gel Apply 1 Application topically 2 times a day as needed (pain)., Historical Med      methotrexate (Trexall) 2.5 mg tablet Take 6 tablets (15 mg total) by mouth 1 (one) time per week.  Sunday, Starting Tue 7/3/2012, Historical Med      metoprolol succinate XL (Toprol-XL) 25 mg 24 hr tablet Take 3 tablets (75 mg) by mouth 2 times a day., Starting Tue 1/16/2018, Historical Med      predniSONE (Deltasone) 1 mg tablet Take 4 tablets (4 mg) by mouth once daily., Historical Med      tamsulosin (Flomax) 0.4 mg 24 hr capsule TAKE 1 CAPSULE BY MOUTH  TWICE DAILY, Starting Thu 7/27/2023, Normal      traMADol (Ultram) 50 mg tablet Take 1 tablet (50 mg) by mouth 2 times a day as needed., Historical Med      UNABLE TO FIND Apply 1 Application topically 2 times a day as needed (pain). Med Name: Blue Emu gel, Historical Med      wheat dextrin (Benefiber Clear SF, dextrin,) 3 gram/3.5 gram powder in packet Take 2 Scoops by mouth once daily., Starting Tue 6/20/2017, Historical Med       !! - Potential duplicate medications found. Please discuss with provider.        SURGICAL HISTORY       Past Surgical History:   Procedure Laterality Date     COLONOSCOPY  12/03/2014    Complete Colonoscopy    ROTATOR CUFF REPAIR  12/20/2013    Rotator Cuff Repair     ALLERGIES     Patient has no known allergies.  FAMILY HISTORY       Family History   Problem Relation Name Age of Onset    Hypertension Mother      Heart disease Mother      Skin cancer Mother      Hypertension Father       SOCIAL HISTORY       Social History     Tobacco Use    Smoking status: Never     Passive exposure: Never    Smokeless tobacco: Never   Vaping Use    Vaping Use: Never used   Substance Use Topics    Alcohol use: Not Currently    Drug use: Never     PHYSICAL EXAM  (up to 7 for level 4, 8 or more for level 5)     ED Triage Vitals [02/21/24 0953]   Temperature Heart Rate Respirations BP   36.9 °C (98.4 °F) 82 18 (!) 173/91      Pulse Ox Temp Source Heart Rate Source Patient Position   98 % Temporal Monitor Sitting      BP Location FiO2 (%)     Right arm --       Physical Exam  Vitals and nursing note reviewed.   Constitutional:       General: He is not in acute distress.     Appearance: Normal appearance. He is not ill-appearing.   HENT:      Head: Normocephalic and atraumatic. No contusion or laceration.      Jaw: No trismus or malocclusion.      Right Ear: External ear normal.      Left Ear: External ear normal.      Mouth/Throat:      Mouth: Mucous membranes are moist.      Pharynx: Oropharynx is clear.   Eyes:      Extraocular Movements: Extraocular movements intact.      Pupils: Pupils are equal, round, and reactive to light.   Neck:      Trachea: No tracheal deviation.   Cardiovascular:      Rate and Rhythm: Normal rate and regular rhythm.      Pulses: Normal pulses.   Pulmonary:      Effort: No respiratory distress.      Breath sounds: No wheezing, rhonchi or rales.   Chest:      Chest wall: No tenderness.   Abdominal:      General: Abdomen is flat. There is no distension.      Palpations: Abdomen is soft. There is no mass.      Tenderness: There is abdominal tenderness (suprapubic).  There is left CVA tenderness. There is no right CVA tenderness.   Musculoskeletal:         General: No tenderness or signs of injury.      Cervical back: Normal range of motion. No tenderness.      Right lower leg: No edema.      Left lower leg: No edema.   Skin:     Coloration: Skin is not jaundiced or pale.      Findings: No rash or wound.   Neurological:      General: No focal deficit present.      Mental Status: He is alert. Mental status is at baseline.   Psychiatric:         Speech: Speech normal.         Behavior: Behavior normal.         Thought Content: Thought content does not include homicidal or suicidal ideation.         Judgment: Judgment normal.        DIAGNOSTIC RESULTS   LABS:  Labs Reviewed   URINALYSIS WITH REFLEX CULTURE AND MICROSCOPIC - Abnormal       Result Value    Color, Urine Yellow      Appearance, Urine Hazy (*)     Specific Gravity, Urine 1.017      pH, Urine 5.0      Protein, Urine 30 (1+) (*)     Glucose, Urine NEGATIVE      Blood, Urine LARGE (3+) (*)     Ketones, Urine NEGATIVE      Bilirubin, Urine NEGATIVE      Urobilinogen, Urine <2.0      Nitrite, Urine NEGATIVE      Leukocyte Esterase, Urine NEGATIVE     CBC WITH AUTO DIFFERENTIAL - Abnormal    WBC 9.4      nRBC 0.0      RBC 4.39 (*)     Hemoglobin 14.8      Hematocrit 45.8       (*)     MCH 33.7      MCHC 32.3      RDW 14.1      Platelets 124 (*)     Neutrophils % 72.8      Immature Granulocytes %, Automated 0.3      Lymphocytes % 10.6      Monocytes % 15.8      Eosinophils % 0.3      Basophils % 0.2      Neutrophils Absolute 6.81 (*)     Immature Granulocytes Absolute, Automated 0.03      Lymphocytes Absolute 0.99      Monocytes Absolute 1.48 (*)     Eosinophils Absolute 0.03      Basophils Absolute 0.02     COMPREHENSIVE METABOLIC PANEL - Abnormal    Glucose 99      Sodium 141      Potassium 3.7      Chloride 104      Bicarbonate 29      Anion Gap 12      Urea Nitrogen 28 (*)     Creatinine 1.22      eGFR 59 (*)      Calcium 9.6      Albumin 4.2      Alkaline Phosphatase 49      Total Protein 7.3      AST 24      Bilirubin, Total 0.9      ALT 23     COAGULATION SCREEN - Abnormal    Protime 13.2 (*)     INR 1.2 (*)     aPTT 31      Narrative:     The APTT is no longer used for monitoring Unfractionated Heparin Therapy. For monitoring Heparin Therapy, use the Heparin Assay.   CBC - Abnormal    WBC 7.1      nRBC 0.0      RBC 3.86 (*)     Hemoglobin 12.9 (*)     Hematocrit 40.5 (*)      (*)     MCH 33.4      MCHC 31.9 (*)     RDW 14.1      Platelets 111 (*)    COMPREHENSIVE METABOLIC PANEL - Abnormal    Glucose 110 (*)     Sodium 140      Potassium 4.9      Chloride 106      Bicarbonate 26      Anion Gap 13      Urea Nitrogen 28 (*)     Creatinine 1.57 (*)     eGFR 43 (*)     Calcium 9.2      Albumin 3.6      Alkaline Phosphatase 49      Total Protein 6.2 (*)     AST 18      Bilirubin, Total 0.9      ALT 17     URINALYSIS MICROSCOPIC WITH REFLEX CULTURE - Abnormal    WBC, Urine 1-5      RBC, Urine >20 (*)    MAGNESIUM - Normal    Magnesium 1.73     URINALYSIS WITH REFLEX CULTURE AND MICROSCOPIC    Narrative:     The following orders were created for panel order Urinalysis with Reflex Culture and Microscopic.  Procedure                               Abnormality         Status                     ---------                               -----------         ------                     Urinalysis with Reflex C...[[id number]]  Abnormal            Final result               Extra Urine Gray Tube[[id number]]                            Final result                 Please view results for these tests on the individual orders.   EXTRA URINE GRAY TUBE    Extra Tube Hold for add-ons.       All other labs were within normal range or not returned as of this dictation.  Imaging  FL fluoro images no charge   Final Result      CT urography w 3D volume rendered imaging   Final Result   1.  21 mm proximal left ureteral calculus causing moderate    hydronephrosis. A few additional intrarenal calculi bilaterally   measuring up to 14 mm.   2. Multiple benign cortical cysts bilaterally.        MACRO:   None.        Signed by: Dale Williamson 2/21/2024 1:11 PM   Dictation workstation:   [id number]         Procedure  Procedures  EMERGENCY DEPARTMENT COURSE/MDM:   Medical Decision Making    Vitals:    Vitals:    02/22/24 1115 02/22/24 1130 02/22/24 1213 02/22/24 1556   BP: 142/81 122/73 153/78 147/84   BP Location: Right arm Right arm Right arm Right arm   Patient Position:    Lying   Pulse: 84 87 108 98   Resp: 22 21 18 19   Temp:    36 °C (96.8 °F)   TempSrc:    Temporal   SpO2: 93% 94% 96% 93%   Weight:       Height:         Joce Galeana is a male 83 y.o. who presents to the ER for flank pain. On arrival the patients vital signs were: Afebrile, Reguar HR, Normotensive, Regular RR, and Oxygenating well on room air. History obtained from: patient.  Given concern for recent kidney stone, basic lab work ordered including CBC, CMP, coags, UA, CT urography.  Pain managed with morphine, Toradol, Zofran for nausea.    My independent interpretation of Labs:   CBC: No acute pathological leukocytosis, leukopenia, thrombocytosis, thrombocytopenia, or anemia  CMP: No acute electrolyte or hepatorenal abnormality  PT/PTT/INR: No acute coagulopathy  UA does not show bacteria, leukocyte esterase, or nitrite to suggest UTI, does show large amount of blood consistent with kidney stone    My independent interpretation of Imaging: CT does show large left-sided obstructive kidney stone with hydronephrosis    On reassessment pain improved    Diagnoses as of 02/29/24 1154   Left nephrolithiasis   Hydronephrosis with obstructing calculus       Consultant discussions: Discussed with urology, Dr. Keller, who agreed to admission for pain control and further assessment for potential surgical planning with Dr. Matthew  Diagnostic testing considered but not performed: None  External  Records Reviewed: I reviewed recent and relevant outside records including inpatient notes, outpatient records    Shared decision making for disposition  Patient and/or patient´s representative was counseled regarding labs, imaging, likely diagnosis. All questions were answered. Recommendation was made for Admission given the need for further escalation of care to inpatient management. Patient agreed and was admitted in stable condition. Admitting team was notified of any pending labs or imaging to ensure continuity of care.     ED Medications administered this visit:    Medications   lactated Ringer's infusion ( intravenous Anesthesia Volume Adjustment 2/22/24 1100)   morphine injection 4 mg (4 mg intravenous Given 2/21/24 1026)   ketorolac (Toradol) injection 15 mg (15 mg intravenous Given 2/21/24 1026)   ondansetron (Zofran) injection 4 mg (4 mg intravenous Given 2/21/24 1026)   iohexol (OMNIPaque) 350 mg iodine/mL solution 75 mL (75 mL intravenous Given 2/21/24 1253)   magnesium sulfate IV 2 g (0 g intravenous Stopped 2/22/24 1457)          Final Impression:   1. Left nephrolithiasis    2. Hydronephrosis with obstructing calculus    3. Nephrolithiasis    4. CKD stage G3a/A1, GFR 45-59 and albumin creatinine ratio <30 mg/g (CMS/[facility])          I reviewed the case with the attending ED physician. The attending ED physician agrees with the plan.   Ramiro Mac DO  PGY-2  Emergency Medicine      Please excuse any misspellings or unintended errors related to the Dragon speech recognition software used to dictate this note.       Vasiliy Broussard DO  Resident  02/21/24 2998    The patient was seen by the resident/fellow.  I have personally performed a substantive portion of the encounter.  I have seen and examined the patient; agree with the workup, evaluation, MDM, management and diagnosis.  The care plan has been discussed with the resident; I have reviewed the resident’s note and agree with the documented  findings.         Ramiro Mac, DO  02/29/24 1152

## 2024-02-21 NOTE — H&P
History Of Present Illness  Joce Galeana is a 83 y.o. male presenting with left flank and pelvic pain that started 3-4 weeks ago. He reports that on 1/4/2024, he had an episode of hematuria with mild left flank pain that was most noticeable at night. He denied dysuria, urgency or frequency. On 1/9/2024, he had an ultrasound that showed two non-obstructing left renal stones up to 1.4cm, no evidence of hydronephrosis. He states that he saw Dr. Matthew (Urologist) yesterday and was scheduled for outpatient follow up for CT Urogram and possible intervention following CT result. This morning, he reports worsening left flank pain, especially with lying down or standing for a long period of time. He rates his pain an 8/10. He also felt nauseous because of the pain, but denied vomiting, or having fever, chills or night sweats. No dysuria, urgency or frequency. He took 2 tylenol pills this morning with minimal relief. He also noticed more hematuria than usual since onset in January. He notes that his last Eliquis dose was yesterday morning.    PMH: Nephrolithiasis, CKD3, rheumatoid arthritis on methotrexate, PMR on prednisone, A-fib on Eliquis, HTN, HLD, BPH  PSH: Lithotripsy 20 years ago  FMHx: Father- HTN, heart disease, mother- skin cancer, heart disease  Allergies: NKDA  Medications: Prednisone, methotrexate, eliquis, tamsulosin, metoprolol succinate, and rest as mentioned below  Social: Denies smoking cigarettes, drinking alcohol, or using illicit drugs    ED Course:  Vitals: 98.4F, HR 84 RR 21, 126/74 95 SpO2    Labs: CBC:  (baseline ~120), no leukocytosis or anemia. CMP showing stable renal function with sCR of 1.22 (baseline~1.2-1.4), Protime/INR: 13.2/1.2. UA showed large amount of blood in urine, >20 RBC, 1+ protein, no evidence of infection    Imaging:  CT urography showed 21 mm proximal left ureteral calculus causing moderate hydronephrosis, a few additional intrarenal calculi bilaterally measuring up  to 14 mm, multiple benign cortical cysts bilaterally    Intervention: Morphine 4 mg, Toradol 15 mg, Zofran 4 mg    Code Status: Full code     Past Medical History  He has a past medical history of Bradycardia, unspecified (02/08/2019), Other conditions influencing health status, Personal history of other diseases of the circulatory system, Personal history of other diseases of the circulatory system, and Rheumatoid arthritis, unspecified (CMS/HCC).    Surgical History  He has a past surgical history that includes Rotator cuff repair (12/20/2013) and Colonoscopy (12/03/2014).     Social History  He reports that he has never smoked. He has never been exposed to tobacco smoke. He has never used smokeless tobacco. He reports that he does not currently use alcohol. He reports that he does not use drugs.    Family History  Family History   Problem Relation Name Age of Onset    Hypertension Mother      Heart disease Mother      Skin cancer Mother      Hypertension Father          Allergies  Patient has no known allergies.    Review of Systems     Physical Exam  Constitutional:       General: He is not in acute distress.     Appearance: Normal appearance. He is not ill-appearing.   HENT:      Head: Normocephalic and atraumatic.      Nose: Nose normal.      Mouth/Throat:      Mouth: Mucous membranes are moist.   Eyes:      Extraocular Movements: Extraocular movements intact.      Pupils: Pupils are equal, round, and reactive to light.   Cardiovascular:      Rate and Rhythm: Normal rate and regular rhythm.      Pulses: Normal pulses.      Heart sounds: Normal heart sounds.   Pulmonary:      Effort: Pulmonary effort is normal. No respiratory distress.      Breath sounds: Normal breath sounds.   Abdominal:      General: Abdomen is flat. Bowel sounds are normal. There is no distension.      Palpations: Abdomen is soft.      Tenderness: There is abdominal tenderness. There is no guarding or rebound.      Comments: Left flank  tender to palpation   Musculoskeletal:         General: Normal range of motion.      Cervical back: Normal range of motion.   Skin:     General: Skin is warm.   Neurological:      General: No focal deficit present.      Mental Status: He is alert.   Psychiatric:         Mood and Affect: Mood normal.         Behavior: Behavior normal.          Last Recorded Vitals  /83   Pulse 80   Temp 36.9 °C (98.4 °F) (Temporal)   Resp 15   Wt 85.3 kg (188 lb)   SpO2 95%     Relevant Results    Scheduled medications  atorvastatin, 20 mg, oral, Nightly  folic acid, 1 mg, oral, Daily  metoprolol succinate XL, 75 mg, oral, BID  predniSONE, 4 mg, oral, Daily  tamsulosin, 0.4 mg, oral, Nightly      Continuous medications     PRN medications  PRN medications: HYDROmorphone, HYDROmorphone, ondansetron      Results for orders placed or performed during the hospital encounter of 02/21/24 (from the past 24 hour(s))   Urinalysis with Reflex Culture and Microscopic   Result Value Ref Range    Color, Urine Yellow Straw, Yellow    Appearance, Urine Hazy (N) Clear    Specific Gravity, Urine 1.017 1.005 - 1.035    pH, Urine 5.0 5.0, 5.5, 6.0, 6.5, 7.0, 7.5, 8.0    Protein, Urine 30 (1+) (N) NEGATIVE mg/dL    Glucose, Urine NEGATIVE NEGATIVE mg/dL    Blood, Urine LARGE (3+) (A) NEGATIVE    Ketones, Urine NEGATIVE NEGATIVE mg/dL    Bilirubin, Urine NEGATIVE NEGATIVE    Urobilinogen, Urine <2.0 <2.0 mg/dL    Nitrite, Urine NEGATIVE NEGATIVE    Leukocyte Esterase, Urine NEGATIVE NEGATIVE   Urinalysis Microscopic   Result Value Ref Range    WBC, Urine 1-5 1-5, NONE /HPF    RBC, Urine >20 (A) NONE, 1-2, 3-5 /HPF   CBC and Auto Differential   Result Value Ref Range    WBC 9.4 4.4 - 11.3 x10*3/uL    nRBC 0.0 0.0 - 0.0 /100 WBCs    RBC 4.39 (L) 4.50 - 5.90 x10*6/uL    Hemoglobin 14.8 13.5 - 17.5 g/dL    Hematocrit 45.8 41.0 - 52.0 %     (H) 80 - 100 fL    MCH 33.7 26.0 - 34.0 pg    MCHC 32.3 32.0 - 36.0 g/dL    RDW 14.1 11.5 - 14.5 %     Platelets 124 (L) 150 - 450 x10*3/uL    Neutrophils % 72.8 40.0 - 80.0 %    Immature Granulocytes %, Automated 0.3 0.0 - 0.9 %    Lymphocytes % 10.6 13.0 - 44.0 %    Monocytes % 15.8 2.0 - 10.0 %    Eosinophils % 0.3 0.0 - 6.0 %    Basophils % 0.2 0.0 - 2.0 %    Neutrophils Absolute 6.81 (H) 1.60 - 5.50 x10*3/uL    Immature Granulocytes Absolute, Automated 0.03 0.00 - 0.50 x10*3/uL    Lymphocytes Absolute 0.99 0.80 - 3.00 x10*3/uL    Monocytes Absolute 1.48 (H) 0.05 - 0.80 x10*3/uL    Eosinophils Absolute 0.03 0.00 - 0.40 x10*3/uL    Basophils Absolute 0.02 0.00 - 0.10 x10*3/uL   Comprehensive metabolic panel   Result Value Ref Range    Glucose 99 74 - 99 mg/dL    Sodium 141 136 - 145 mmol/L    Potassium 3.7 3.5 - 5.3 mmol/L    Chloride 104 98 - 107 mmol/L    Bicarbonate 29 21 - 32 mmol/L    Anion Gap 12 10 - 20 mmol/L    Urea Nitrogen 28 (H) 6 - 23 mg/dL    Creatinine 1.22 0.50 - 1.30 mg/dL    eGFR 59 (L) >60 mL/min/1.73m*2    Calcium 9.6 8.6 - 10.3 mg/dL    Albumin 4.2 3.4 - 5.0 g/dL    Alkaline Phosphatase 49 33 - 136 U/L    Total Protein 7.3 6.4 - 8.2 g/dL    AST 24 9 - 39 U/L    Bilirubin, Total 0.9 0.0 - 1.2 mg/dL    ALT 23 10 - 52 U/L   Coagulation Screen   Result Value Ref Range    Protime 13.2 (H) 9.8 - 12.8 seconds    INR 1.2 (H) 0.9 - 1.1    aPTT 31 27 - 38 seconds   ECG 12 lead   Result Value Ref Range    Ventricular Rate 88 BPM    Atrial Rate 73 BPM    QRS Duration 78 ms    QT Interval 376 ms    QTC Calculation(Bazett) 454 ms    R Axis -35 degrees    T Axis -29 degrees    QRS Count 15 beats    Q Onset 217 ms    T Offset 405 ms    QTC Fredericia 427 ms     ECG 12 lead    Result Date: 2/21/2024  Atrial fibrillation Left axis deviation Anterior infarct , age undetermined Abnormal ECG When compared with ECG of 27-JAN-2012 08:29, Atrial fibrillation has replaced Sinus rhythm Vent. rate has increased BY  34 BPM Inverted T waves have replaced nonspecific T wave abnormality in Inferior leads Nonspecific T wave  abnormality no longer evident in Lateral leads    CT urography w 3D volume rendered imaging    Result Date: 2/21/2024  Interpreted By:  Dale Williamson, STUDY: CT UROGRAPHY WITH 3D VOLUME RENDERED IMAGING;  2/21/2024 12:51 pm   INDICATION: Signs/Symptoms:hematuria.   COMPARISON: None.   ACCESSION NUMBER(S): [id number]   ORDERING CLINICIAN: CHICHI BLOUNT   TECHNIQUE: CT of the abdomen and pelvis was performed.  Contiguous axial images were obtained at 3 mm slice thickness through the abdomen and pelvis. Coronal and sagittal reconstructions at 3 mm slice thickness were performed. 75 mL Omnipaque 350 administered intravenously without immediate complication.   FINDINGS: LOWER CHEST: Bibasilar atelectasis. Coronary atherosclerosis.   ABDOMEN:   LIVER: Unremarkable   BILE DUCTS: Not dilated.   GALLBLADDER: No calcified stone or definite inflammation.   PANCREAS: Unremarkable   SPLEEN: Unremarkable   ADRENAL GLANDS: Unremarkable   KIDNEYS AND URETERS: 21 mm proximal left ureteral calculus causing moderate hydronephrosis. Additional intrarenal calculi on the left measure 14 mm, 9 mm, and 6 mm. A 3 mm nonobstructing calculus in the right upper pole is also noted. No right hydronephrosis. There are multiple simple cortical cysts (Bosniak I) bilaterally measuring up to 6.7 cm in the left lower pole. A few hyperdense cysts with noncontrast attenuation greater than 70 HU are also noted (Bosniak II). A hyperdense cyst in the anterior mid left kidney measuring 11 mm, noncontrast attenuation 60 HU, postcontrast 54 HU, compatible with benign hemorrhagic cyst is also noted (Bosniak II). No suspicious enhancing renal lesions are identified. The kidneys are otherwise normal in cortical thickness. Delayed images demonstrate no suspicious right upper urinary tract mass, wall thickening, or filling defect. On the left, there is no significant excretion of contrast which limits evaluation of the left ureter.   PELVIS:   BLADDER:  Nondistended which limits evaluation.   REPRODUCTIVE ORGANS: Mild prostatomegaly.   BOWEL: Proximal duodenal diverticulum noted. Severe descending and sigmoid colonic diverticulosis. Moderate diverticulosis also noted near the hepatic flexure. No findings of diverticulitis. No findings of bowel obstruction or inflammation. Unremarkable appendix.    Unremarkable mesentery.   VESSELS: Aortoiliac system is patent without aneurysm.  Major visceral branches are patent.Severe atherosclerosis. IVC and major branches are grossly patent. Major portal venous branches are patent.   PERITONEUM AND RETROPERITONEUM: No free fluid or free air.    No abdominal or pelvic lymphadenopathy.   BONE AND ABDOMINAL WALL: No acute skeletal findings.   Degenerative changes of the spine. Grade 1 anterolisthesis L3-4 and L4-5.         1.  21 mm proximal left ureteral calculus causing moderate hydronephrosis. A few additional intrarenal calculi bilaterally measuring up to 14 mm. 2. Multiple benign cortical cysts bilaterally.   MACRO: None.   Signed by: Dale Williamson 2/21/2024 1:11 PM Dictation workstation:   [id number]       Assessment/Plan   Principal Problem:    Nephrolithiasis    Joce is a 83 year old male with a PMH significant for nephrolithiasis, CKD3, rheumatoid arthritis on methotrexate, PMR on prednisone, A-fib on Eliquis, HTN, HLD, BPH presenting with left flank pain and hematuria. Admitted for further workup.    #Left obstructing ureteral stone  #Moderate hydronephrosis 2/2 above  #Flank pain 2/2 above   -Patient presenting with left flank pain and hematuria that began 3-4 weeks ago but reports progressive worsening of pain and hematuria this morning.  -CT showed 21 mm proximal left ureteral calculus causing moderate hydronephrosis and a few additional intrarenal calculi bilaterally measuring up to 14 mm. Stable renal function.  -UA showed large amount of blood in urine, 1+ protein, no evidence of infection  Plan:  -No indication  for antibiotics, patient is stable, no signs of infection  -Maintenance fluids   -Dilaudid as needed for pain control, zofran as needed for nausea  -Continue holding his home eliquis   -NPO for now pending urology consult for timing of intervention    Chronic conditions:  #CKD3  #RA  #PMR  #A-fib  #HTN  #HLD  #BPH  -Eliquis on hold pending Urology consult  -Continue home medications as tolerated    Diet: NPO   Consult: Urology    Code status: Full code    Yudelka Allred, MS3      Patient seen and examined independent of medical student.  My input was implemented above and agree with documentation    Laz Mueller, DO  PGY2 IM

## 2024-02-22 ENCOUNTER — ANESTHESIA (OUTPATIENT)
Dept: OPERATING ROOM | Facility: HOSPITAL | Age: 84
End: 2024-02-22
Payer: MEDICARE

## 2024-02-22 ENCOUNTER — ANESTHESIA EVENT (OUTPATIENT)
Dept: OPERATING ROOM | Facility: HOSPITAL | Age: 84
End: 2024-02-22
Payer: MEDICARE

## 2024-02-22 ENCOUNTER — APPOINTMENT (OUTPATIENT)
Dept: RADIOLOGY | Facility: HOSPITAL | Age: 84
End: 2024-02-22
Payer: MEDICARE

## 2024-02-22 VITALS
HEART RATE: 98 BPM | WEIGHT: 189.15 LBS | HEIGHT: 64 IN | RESPIRATION RATE: 19 BRPM | DIASTOLIC BLOOD PRESSURE: 84 MMHG | BODY MASS INDEX: 32.29 KG/M2 | TEMPERATURE: 96.8 F | OXYGEN SATURATION: 93 % | SYSTOLIC BLOOD PRESSURE: 147 MMHG

## 2024-02-22 PROBLEM — N20.0 LEFT NEPHROLITHIASIS: Status: ACTIVE | Noted: 2024-02-21

## 2024-02-22 PROBLEM — N20.0 LEFT NEPHROLITHIASIS: Status: RESOLVED | Noted: 2024-02-21 | Resolved: 2024-02-22

## 2024-02-22 LAB
ALBUMIN SERPL BCP-MCNC: 3.6 G/DL (ref 3.4–5)
ALP SERPL-CCNC: 49 U/L (ref 33–136)
ALT SERPL W P-5'-P-CCNC: 17 U/L (ref 10–52)
ANION GAP SERPL CALC-SCNC: 13 MMOL/L (ref 10–20)
AST SERPL W P-5'-P-CCNC: 18 U/L (ref 9–39)
BILIRUB SERPL-MCNC: 0.9 MG/DL (ref 0–1.2)
BUN SERPL-MCNC: 28 MG/DL (ref 6–23)
CALCIUM SERPL-MCNC: 9.2 MG/DL (ref 8.6–10.3)
CHLORIDE SERPL-SCNC: 106 MMOL/L (ref 98–107)
CO2 SERPL-SCNC: 26 MMOL/L (ref 21–32)
CREAT SERPL-MCNC: 1.57 MG/DL (ref 0.5–1.3)
EGFRCR SERPLBLD CKD-EPI 2021: 43 ML/MIN/1.73M*2
ERYTHROCYTE [DISTWIDTH] IN BLOOD BY AUTOMATED COUNT: 14.1 % (ref 11.5–14.5)
GLUCOSE SERPL-MCNC: 110 MG/DL (ref 74–99)
HCT VFR BLD AUTO: 40.5 % (ref 41–52)
HGB BLD-MCNC: 12.9 G/DL (ref 13.5–17.5)
MAGNESIUM SERPL-MCNC: 1.73 MG/DL (ref 1.6–2.4)
MCH RBC QN AUTO: 33.4 PG (ref 26–34)
MCHC RBC AUTO-ENTMCNC: 31.9 G/DL (ref 32–36)
MCV RBC AUTO: 105 FL (ref 80–100)
NRBC BLD-RTO: 0 /100 WBCS (ref 0–0)
PLATELET # BLD AUTO: 111 X10*3/UL (ref 150–450)
POTASSIUM SERPL-SCNC: 4.9 MMOL/L (ref 3.5–5.3)
PROT SERPL-MCNC: 6.2 G/DL (ref 6.4–8.2)
RBC # BLD AUTO: 3.86 X10*6/UL (ref 4.5–5.9)
SODIUM SERPL-SCNC: 140 MMOL/L (ref 136–145)
WBC # BLD AUTO: 7.1 X10*3/UL (ref 4.4–11.3)

## 2024-02-22 PROCEDURE — A52332 PR CYSTOSCOPY,INSERT URETERAL STENT: Performed by: ANESTHESIOLOGY

## 2024-02-22 PROCEDURE — 2500000004 HC RX 250 GENERAL PHARMACY W/ HCPCS (ALT 636 FOR OP/ED): Performed by: UROLOGY

## 2024-02-22 PROCEDURE — 2500000005 HC RX 250 GENERAL PHARMACY W/O HCPCS: Performed by: ANESTHESIOLOGY

## 2024-02-22 PROCEDURE — 36415 COLL VENOUS BLD VENIPUNCTURE: CPT

## 2024-02-22 PROCEDURE — 7100000002 HC RECOVERY ROOM TIME - EACH INCREMENTAL 1 MINUTE: Performed by: UROLOGY

## 2024-02-22 PROCEDURE — 2500000004 HC RX 250 GENERAL PHARMACY W/ HCPCS (ALT 636 FOR OP/ED): Performed by: NURSE ANESTHETIST, CERTIFIED REGISTERED

## 2024-02-22 PROCEDURE — 96376 TX/PRO/DX INJ SAME DRUG ADON: CPT | Mod: 59

## 2024-02-22 PROCEDURE — 7100000001 HC RECOVERY ROOM TIME - INITIAL BASE CHARGE: Performed by: UROLOGY

## 2024-02-22 PROCEDURE — 99100 ANES PT EXTEME AGE<1 YR&>70: CPT | Performed by: ANESTHESIOLOGY

## 2024-02-22 PROCEDURE — 85027 COMPLETE CBC AUTOMATED: CPT

## 2024-02-22 PROCEDURE — 3600000008 HC OR TIME - EACH INCREMENTAL 1 MINUTE - PROCEDURE LEVEL THREE: Performed by: UROLOGY

## 2024-02-22 PROCEDURE — 2720000007 HC OR 272 NO HCPCS: Performed by: UROLOGY

## 2024-02-22 PROCEDURE — 80053 COMPREHEN METABOLIC PANEL: CPT

## 2024-02-22 PROCEDURE — 52332 CYSTOSCOPY AND TREATMENT: CPT | Performed by: UROLOGY

## 2024-02-22 PROCEDURE — 2500000004 HC RX 250 GENERAL PHARMACY W/ HCPCS (ALT 636 FOR OP/ED): Performed by: ANESTHESIOLOGY

## 2024-02-22 PROCEDURE — C2617 STENT, NON-COR, TEM W/O DEL: HCPCS | Performed by: UROLOGY

## 2024-02-22 PROCEDURE — 83735 ASSAY OF MAGNESIUM: CPT

## 2024-02-22 PROCEDURE — 2780000003 HC OR 278 NO HCPCS: Performed by: UROLOGY

## 2024-02-22 PROCEDURE — 3700000001 HC GENERAL ANESTHESIA TIME - INITIAL BASE CHARGE: Performed by: UROLOGY

## 2024-02-22 PROCEDURE — 2500000004 HC RX 250 GENERAL PHARMACY W/ HCPCS (ALT 636 FOR OP/ED)

## 2024-02-22 PROCEDURE — 96365 THER/PROPH/DIAG IV INF INIT: CPT | Mod: 59

## 2024-02-22 PROCEDURE — 2500000001 HC RX 250 WO HCPCS SELF ADMINISTERED DRUGS (ALT 637 FOR MEDICARE OP): Performed by: UROLOGY

## 2024-02-22 PROCEDURE — 74420 UROGRAPHY RTRGR +-KUB: CPT | Performed by: UROLOGY

## 2024-02-22 PROCEDURE — 3700000002 HC GENERAL ANESTHESIA TIME - EACH INCREMENTAL 1 MINUTE: Performed by: UROLOGY

## 2024-02-22 PROCEDURE — 3600000003 HC OR TIME - INITIAL BASE CHARGE - PROCEDURE LEVEL THREE: Performed by: UROLOGY

## 2024-02-22 PROCEDURE — G0378 HOSPITAL OBSERVATION PER HR: HCPCS

## 2024-02-22 PROCEDURE — A52332 PR CYSTOSCOPY,INSERT URETERAL STENT: Performed by: NURSE ANESTHETIST, CERTIFIED REGISTERED

## 2024-02-22 PROCEDURE — 2500000005 HC RX 250 GENERAL PHARMACY W/O HCPCS: Performed by: NURSE ANESTHETIST, CERTIFIED REGISTERED

## 2024-02-22 PROCEDURE — 99236 HOSP IP/OBS SAME DATE HI 85: CPT | Performed by: STUDENT IN AN ORGANIZED HEALTH CARE EDUCATION/TRAINING PROGRAM

## 2024-02-22 PROCEDURE — C1769 GUIDE WIRE: HCPCS | Performed by: UROLOGY

## 2024-02-22 PROCEDURE — 96366 THER/PROPH/DIAG IV INF ADDON: CPT | Mod: 59

## 2024-02-22 DEVICE — URETERAL STENT
Type: IMPLANTABLE DEVICE | Site: URETER | Status: FUNCTIONAL
Brand: PERCUFLEX™ PLUS

## 2024-02-22 RX ORDER — MIDAZOLAM HYDROCHLORIDE 1 MG/ML
1 INJECTION, SOLUTION INTRAMUSCULAR; INTRAVENOUS ONCE AS NEEDED
Status: DISCONTINUED | OUTPATIENT
Start: 2024-02-22 | End: 2024-02-22 | Stop reason: HOSPADM

## 2024-02-22 RX ORDER — PROPOFOL 10 MG/ML
INJECTION, EMULSION INTRAVENOUS AS NEEDED
Status: DISCONTINUED | OUTPATIENT
Start: 2024-02-22 | End: 2024-02-22

## 2024-02-22 RX ORDER — MAGNESIUM SULFATE HEPTAHYDRATE 40 MG/ML
2 INJECTION, SOLUTION INTRAVENOUS ONCE
Status: COMPLETED | OUTPATIENT
Start: 2024-02-22 | End: 2024-02-22

## 2024-02-22 RX ORDER — HYDRALAZINE HYDROCHLORIDE 20 MG/ML
5 INJECTION INTRAMUSCULAR; INTRAVENOUS EVERY 30 MIN PRN
Status: DISCONTINUED | OUTPATIENT
Start: 2024-02-22 | End: 2024-02-22 | Stop reason: HOSPADM

## 2024-02-22 RX ORDER — HYDROMORPHONE HYDROCHLORIDE 1 MG/ML
0.2 INJECTION, SOLUTION INTRAMUSCULAR; INTRAVENOUS; SUBCUTANEOUS EVERY 5 MIN PRN
Status: DISCONTINUED | OUTPATIENT
Start: 2024-02-22 | End: 2024-02-22 | Stop reason: HOSPADM

## 2024-02-22 RX ORDER — CEFAZOLIN SODIUM 2 G/100ML
INJECTION, SOLUTION INTRAVENOUS AS NEEDED
Status: DISCONTINUED | OUTPATIENT
Start: 2024-02-22 | End: 2024-02-22

## 2024-02-22 RX ORDER — LIDOCAINE HYDROCHLORIDE 20 MG/ML
INJECTION, SOLUTION EPIDURAL; INFILTRATION; INTRACAUDAL; PERINEURAL AS NEEDED
Status: DISCONTINUED | OUTPATIENT
Start: 2024-02-22 | End: 2024-02-22

## 2024-02-22 RX ORDER — FENTANYL CITRATE 50 UG/ML
INJECTION, SOLUTION INTRAMUSCULAR; INTRAVENOUS AS NEEDED
Status: DISCONTINUED | OUTPATIENT
Start: 2024-02-22 | End: 2024-02-22

## 2024-02-22 RX ORDER — SODIUM CHLORIDE, SODIUM LACTATE, POTASSIUM CHLORIDE, CALCIUM CHLORIDE 600; 310; 30; 20 MG/100ML; MG/100ML; MG/100ML; MG/100ML
100 INJECTION, SOLUTION INTRAVENOUS CONTINUOUS
Status: DISCONTINUED | OUTPATIENT
Start: 2024-02-22 | End: 2024-02-22 | Stop reason: HOSPADM

## 2024-02-22 RX ORDER — HYDROMORPHONE HYDROCHLORIDE 1 MG/ML
0.5 INJECTION, SOLUTION INTRAMUSCULAR; INTRAVENOUS; SUBCUTANEOUS EVERY 5 MIN PRN
Status: DISCONTINUED | OUTPATIENT
Start: 2024-02-22 | End: 2024-02-22 | Stop reason: HOSPADM

## 2024-02-22 RX ORDER — ONDANSETRON HYDROCHLORIDE 2 MG/ML
4 INJECTION, SOLUTION INTRAVENOUS ONCE AS NEEDED
Status: DISCONTINUED | OUTPATIENT
Start: 2024-02-22 | End: 2024-02-22 | Stop reason: HOSPADM

## 2024-02-22 RX ORDER — HYDROMORPHONE HYDROCHLORIDE 1 MG/ML
0.1 INJECTION, SOLUTION INTRAMUSCULAR; INTRAVENOUS; SUBCUTANEOUS EVERY 5 MIN PRN
Status: DISCONTINUED | OUTPATIENT
Start: 2024-02-22 | End: 2024-02-22 | Stop reason: HOSPADM

## 2024-02-22 RX ORDER — OXYCODONE AND ACETAMINOPHEN 5; 325 MG/1; MG/1
1 TABLET ORAL EVERY 6 HOURS PRN
Qty: 12 TABLET | Refills: 0 | Status: SHIPPED | OUTPATIENT
Start: 2024-02-22 | End: 2024-02-25

## 2024-02-22 RX ORDER — PHENYLEPHRINE HCL IN 0.9% NACL 1 MG/10 ML
SYRINGE (ML) INTRAVENOUS AS NEEDED
Status: DISCONTINUED | OUTPATIENT
Start: 2024-02-22 | End: 2024-02-22

## 2024-02-22 RX ORDER — ALBUTEROL SULFATE 0.83 MG/ML
2.5 SOLUTION RESPIRATORY (INHALATION) ONCE AS NEEDED
Status: DISCONTINUED | OUTPATIENT
Start: 2024-02-22 | End: 2024-02-22 | Stop reason: HOSPADM

## 2024-02-22 RX ADMIN — Medication 200 MCG: at 10:40

## 2024-02-22 RX ADMIN — HYDROCORTISONE SODIUM SUCCINATE 100 MG: 100 INJECTION, POWDER, FOR SOLUTION INTRAMUSCULAR; INTRAVENOUS at 10:19

## 2024-02-22 RX ADMIN — LIDOCAINE HYDROCHLORIDE 80 MG: 20 INJECTION, SOLUTION EPIDURAL; INFILTRATION; INTRACAUDAL; PERINEURAL at 10:12

## 2024-02-22 RX ADMIN — Medication: at 11:15

## 2024-02-22 RX ADMIN — PREDNISONE 4 MG: 1 TABLET ORAL at 12:58

## 2024-02-22 RX ADMIN — Medication 200 MCG: at 10:28

## 2024-02-22 RX ADMIN — CEFAZOLIN SODIUM 2 G: 2 INJECTION, SOLUTION INTRAVENOUS at 10:26

## 2024-02-22 RX ADMIN — ONDANSETRON 4 MG: 2 INJECTION INTRAMUSCULAR; INTRAVENOUS at 10:19

## 2024-02-22 RX ADMIN — PROPOFOL 120 MG: 10 INJECTION, EMULSION INTRAVENOUS at 10:12

## 2024-02-22 RX ADMIN — METOPROLOL SUCCINATE 75 MG: 50 TABLET, EXTENDED RELEASE ORAL at 12:58

## 2024-02-22 RX ADMIN — MAGNESIUM SULFATE HEPTAHYDRATE 2 G: 40 INJECTION, SOLUTION INTRAVENOUS at 12:57

## 2024-02-22 RX ADMIN — FOLIC ACID 1 MG: 1 TABLET ORAL at 12:58

## 2024-02-22 RX ADMIN — Medication 100 MCG: at 10:24

## 2024-02-22 RX ADMIN — PROPOFOL 50 MG: 10 INJECTION, EMULSION INTRAVENOUS at 10:28

## 2024-02-22 RX ADMIN — HYDROMORPHONE HYDROCHLORIDE 0.5 MG: 1 INJECTION, SOLUTION INTRAMUSCULAR; INTRAVENOUS; SUBCUTANEOUS at 04:13

## 2024-02-22 RX ADMIN — PROPOFOL 30 MG: 10 INJECTION, EMULSION INTRAVENOUS at 10:38

## 2024-02-22 RX ADMIN — FENTANYL CITRATE 50 MCG: 50 INJECTION, SOLUTION INTRAMUSCULAR; INTRAVENOUS at 10:12

## 2024-02-22 RX ADMIN — FENTANYL CITRATE 50 MCG: 50 INJECTION, SOLUTION INTRAMUSCULAR; INTRAVENOUS at 10:19

## 2024-02-22 RX ADMIN — HYDROMORPHONE HYDROCHLORIDE 0.5 MG: 1 INJECTION, SOLUTION INTRAMUSCULAR; INTRAVENOUS; SUBCUTANEOUS at 00:03

## 2024-02-22 SDOH — HEALTH STABILITY: MENTAL HEALTH: CURRENT SMOKER: 0

## 2024-02-22 ASSESSMENT — PAIN DESCRIPTION - LOCATION
LOCATION: ABDOMEN
LOCATION: ABDOMEN

## 2024-02-22 ASSESSMENT — PAIN DESCRIPTION - ORIENTATION: ORIENTATION: LEFT

## 2024-02-22 ASSESSMENT — PAIN - FUNCTIONAL ASSESSMENT
PAIN_FUNCTIONAL_ASSESSMENT: 0-10
PAIN_FUNCTIONAL_ASSESSMENT: 0-10

## 2024-02-22 ASSESSMENT — PAIN SCALES - GENERAL
PAINLEVEL_OUTOF10: 7
PAINLEVEL_OUTOF10: 6
PAINLEVEL_OUTOF10: 0 - NO PAIN

## 2024-02-22 NOTE — DISCHARGE SUMMARY
Discharge Diagnosis  Nephrolithiasis    Issues Requiring Follow-Up  Left ureteral stent placed 2/22/24, follow-up with urology for definitive stone management  Follow-up on renal function, repeat BMP ordered, follow up with PCP for results    Discharge Meds     Your medication list        START taking these medications        Instructions Last Dose Given Next Dose Due   oxyCODONE-acetaminophen 5-325 mg tablet  Commonly known as: Percocet      Take 1 tablet by mouth every 6 hours if needed for moderate pain (4 - 6) or severe pain (7 - 10) for up to 3 days.              CHANGE how you take these medications        Instructions Last Dose Given Next Dose Due   atorvastatin 20 mg tablet  Commonly known as: Lipitor  What changed: when to take this      TAKE 1 TABLET BY MOUTH DAILY              CONTINUE taking these medications        Instructions Last Dose Given Next Dose Due   Benefiber Clear SF (dextrin) 3 gram/3.5 gram powder in packet  Generic drug: wheat dextrin           Biofreeze (menthol) 4 % gel gel  Generic drug: menthol           cholecalciferol 25 MCG (1000 UT) tablet  Commonly known as: Vitamin D-3           Eliquis 5 mg tablet  Generic drug: apixaban           enalapril 10 mg tablet  Commonly known as: Vasotec      TAKE 1 TABLET BY MOUTH DAILY       folic acid 1 mg tablet  Commonly known as: Folvite           latanoprost 0.005 % ophthalmic solution  Commonly known as: Xalatan           meclizine 25 mg tablet  Commonly known as: Antivert           methotrexate 2.5 mg tablet  Commonly known as: Trexall           metoprolol succinate XL 25 mg 24 hr tablet  Commonly known as: Toprol-XL           predniSONE 1 mg tablet  Commonly known as: Deltasone           tamsulosin 0.4 mg 24 hr capsule  Commonly known as: Flomax      TAKE 1 CAPSULE BY MOUTH  TWICE DAILY       traMADol 50 mg tablet  Commonly known as: Ultram           acetaminophen 500 mg tablet  Commonly known as: Tylenol           Tylenol Extra Strength 500  mg tablet  Generic drug: acetaminophen           UNABLE TO FIND           Voltaren 1 % gel  Generic drug: diclofenac sodium                     Where to Get Your Medications        You can get these medications from any pharmacy    Bring a paper prescription for each of these medications  oxyCODONE-acetaminophen 5-325 mg tablet         Test Results Pending At Discharge  Pending Labs       No current pending labs.            Hospital Course  Joce Galeana is a 83 y.o. male with a history of nephrolithiasis, CKD3, rheumatoid arthritis on methotrexate, PMR on prednisone, A-fib on Eliquis, HTN, HLD, BPH who presented with left flank and pelvic pain. On arrival afebrile, HDS. CT urography showed 21 mm proximal left ureteral calculus causing moderate hydronephrosis, a few additional intrarenal calculi bilaterally measuring up to 14 mm, multiple benign cortical cysts bilaterally. Labs w/o leukocytosis, renal function with mild uptrend from his baseline, UA negative. Urology was consulted, patient underwent L ureteral stent placement 2/22/24 without complications. Upon reevaluation, he was without complaints and endorsing wanting to go home. He was advised to restart his eliquis on 2/23/24 and to follow-up with urology for definitive stone management. He was also advised to get a repeat bmp to assess his renal function and to follow-up with his PCP. He was educated on his hospital course, expressed understanding with agreement to plan. He was discharged home in stable condition.      Pertinent Physical Exam At Time of Discharge  Physical Exam  General: NAD  Skin: Warm and dry  Head/Neck: NC; supple, non-rigid, no JVD  CV: regular rate and rhythm, no murmurs  RESP: no resp distress on room air  Abd: soft, nontender, nondistended  Neuro: alert and oriented x3  Extremities: no peripheral edema appreciated, cap refill <2s    Outpatient Follow-Up  Future Appointments   Date Time Provider Department Center   2/26/2024 10:30 AM  Zainab Henning MD PhD [id number] [place]   2/29/2024 12:30 PM KIRK [id number] CT 1 [id number] [facility]    3/12/2024 11:40 AM Lamberto Matthew MD [id number] [place]         Abelardo Naylor DO    .-------------------------  Attending Addendum  -------------------------    Seen and examined with resident physicians on 2/22 after admission on 2/21.  Labs and imaging personally reviewed.  S/p stent placement for obstructive nephrolithiasis by urology today, evaluated following procedure patient is feeling well, had some renal dysfunction on labs this morning that should resolve with relief of obstruction which was achieved today, he can follow-up with a renal function lab in the next couple days, hold Eliquis until tomorrow and follow-up with urology in the clinic.  Discussed this plan with him at length bedside, he was agreeable, reported his pain had mostly resolved, he was stable and discharged home with a prescription for Percocet and instructions to follow-up with urology and his PCP.    Case discussed with case management, residents and patient.    Complexity: High    I saw and evaluated the patient. I personally obtained the key and critical portions of the history and physical exam or was physically present for key and critical portions performed by the resident/fellow. I reviewed the resident/fellow's documentation and discussed the patient with the resident/fellow. I agree with the resident/fellow's medical decision making as documented in the note.    Dieudonne Naylor DO

## 2024-02-22 NOTE — ANESTHESIA PREPROCEDURE EVALUATION
Patient: Joce Galeana    Procedure Information       Date/Time: 02/22/24 1000    Procedure: Cystoscopy with Insertion Stent Ureter (Left)    Location: [facility] / [facility]    Surgeons: Lamberto Matthew MD            Relevant Problems   Cardiovascular   (+) Atrial fibrillation (CMS/HCC)   (+) Benign essential hypertension   (+) Bleeding internal hemorrhoids   (+) Hyperlipidemia      Endocrine   (+) Obesity      GI   (+) Bleeding internal hemorrhoids      /Renal   (+) Acute UTI   (+) CKD stage G3a/A1, GFR 45-59 and albumin creatinine ratio <30 mg/g (CMS/HCC)   (+) Impaired renal function   (+) Left nephrolithiasis   (+) Nephrolithiasis      Neuro/Psych   (+) Right sciatic nerve pain      Hematology   (+) Anemia      Musculoskeletal   (+) Rheumatoid arthritis (CMS/HCC)      Infectious Disease   (+) Acute UTI   (+) Upper respiratory infection      Other   (+) Rheumatoid arthritis (CMS/HCC)       Clinical information reviewed:   Tobacco  Allergies  Meds   Med Hx  Surg Hx   Fam Hx  Soc Hx        NPO Detail:  No data recorded     Physical Exam    Airway  Mallampati: III  TM distance: >3 FB  Neck ROM: full     Cardiovascular   Rhythm: regular  Rate: normal     Dental    Pulmonary   Breath sounds clear to auscultation  (+) decreased breath sounds     Abdominal   (+) obese  Abdomen: soft  Bowel sounds: normal           Anesthesia Plan    History of general anesthesia?: yes  History of complications of general anesthesia?: no    ASA 3     general   (Intra-op stress dose steroids)  The patient is not a current smoker.  Patient was previously instructed to abstain from smoking on day of procedure.  Patient did not smoke on day of procedure.  Education provided regarding risk of obstructive sleep apnea.  intravenous induction   Postoperative administration of opioids is intended.  Anesthetic plan and risks discussed with patient.  Use of blood products discussed with patient who.    Plan discussed with  CAA.

## 2024-02-22 NOTE — DISCHARGE INSTRUCTIONS
Please follow-up with urology within 1 week.  You should receive a call from their office, if not please call to make this appointment, contact was provided.    Please follow-up with your primary care physician within 1 week regarding hospital stay.  Please call to make this appointment.    Medications:  -You can resume your Eliquis tomorrow 2/23/2024  -You also have repeat blood work ordered for 2/26/24 to check your kidney function please do this at any  laboratory and please follow-up with your primary care doctor for result      If you have any concerning symptoms, or worsening symptoms please call or return, such as chest pain, shortness of breath, new onset confusion, loss of sensation, or fever >103F.    Thank you for allowing us to participate in your health care.    -[facility].

## 2024-02-22 NOTE — ANESTHESIA PROCEDURE NOTES
Airway  Date/Time: 2/22/2024 10:14 AM  Urgency: elective    Airway not difficult    Staffing  Performed: CRNA   Authorized by: Carmela Joiner MD    Performed by: NORY Alcala-JOSEPH  Patient location during procedure: OR    Indications and Patient Condition  Indications for airway management: anesthesia  Spontaneous ventilation: present  Sedation level: deep  Preoxygenated: yes  Patient position: sniffing  MILS maintained throughout  Mask difficulty assessment: 0 - not attempted    Final Airway Details  Final airway type: supraglottic airway      Successful airway: unique  Size 4     Number of attempts at approach: 1

## 2024-02-22 NOTE — OP NOTE
Date: 2024  OR Location: [facility] OR    Name: Joce Galeana, : 1940, Age: 83 y.o., MRN: [id number], Sex: male    Diagnosis  Pre-op Diagnosis     * Left nephrolithiasis [N20.0] Post-op Diagnosis     * Left nephrolithiasis [N20.0]     Procedures  1.  Cystoscopy and left retrograde pyelogram  2.  Left ureteral stent placement      Surgeons      * Lamberto Matthew - Primary    Resident/Fellow/Other Assistant:  Surgeon(s) and Role:    Procedure Summary  Anesthesia: * No anesthesia type entered *  ASA: III  Anesthesia Staff:   Anesthesiologist: Carmela Joiner MD  CRNA: NORY Alcala-CRNA  Estimated Blood Loss: Minimal  Intra-op Medications:   Medication Name Total Dose   sodium chloride 0.9 % irrigation solution 3,000 mL   ceFAZolin in dextrose (iso-os) (Ancef) IVPB 2 g 2 g              Anesthesia Record               Intraprocedure I/O Totals       None           Specimen:   No specimens collected during this procedure.    Staff:   Circulator: Lillian Gonzalez RN  Scrub Person: Blake Irwin         Drains and/or Catheters: * None in log *    Tourniquet Times:         Implants:     Findings: Large calculus in the left renal pelvis    Indications: Joce Galeana is an 83 y.o. male who is having surgery for Left nephrolithiasis [N20.0].  Patient presented with left renal colic.  He was found to have a large stone in the left renal pelvis.  Presents now for stent placement.  Plan will be to then discharge the patient and schedule outpatient stone treatment.      Procedure Details: Patient is brought to the operating room and placed in the supine position. General anesthesia is induced. Once he is adequately anesthetized, his legs are placed in the dorsal lithotomy position. His genitalia prepped and draped in the usual sterile fashion.    A 22 Stateless cystoscope was passed atraumatically per the urethra.  The anterior, posterior, and prostatic urethra were unremarkable.  We entered  the bladder and inspected the bladder.  No tumors, clots, stones, or foreign bodies were identified.  Attention was turned to the left ureteral orifice.  It was cannulated with a 5 Korean open-ended catheter and a retrograde pyelogram was performed.  There was a large filling defect in the renal pelvis consistent with a stone with mild to moderate hydronephrosis.  We advanced a 0.35 sensor wire to the renal pelvis under fluoroscopy.  This was left in place and the open-ended catheter was removed.  A 6 Korean by 26 cm double-J stent was advanced over the wire and into the fluoroscopy revealed excellent position of the stent.  The bladder was drained and the cystoscope was removed.  Patient was awakened and taken to the PACU in stable condition.    Complications:  None; patient tolerated the procedure well.    Disposition: PACU - hemodynamically stable.  Condition: stable   Lamberto Matthew  Phone Number: [phone]

## 2024-02-22 NOTE — ANESTHESIA POSTPROCEDURE EVALUATION
Patient: Joce Galeana    Procedure Summary       Date: 02/22/24 Room / Location: [facility] OR 06 / Virtual [facility] OR    Anesthesia Start: 1004 Anesthesia Stop: 1100    Procedure: Cystoscopy with Insertion Stent Ureter (Left) Diagnosis:       Left nephrolithiasis      (Left nephrolithiasis [N20.0])    Surgeons: Lamberto Matthew MD Responsible Provider: Carmela Joiner MD    Anesthesia Type: general ASA Status: 3            Anesthesia Type: general    Vitals Value Taken Time   /72 02/22/24 1054   Temp 36.2 02/22/24 1100   Pulse 81 02/22/24 1058   Resp 16 02/22/24 1058   SpO2 98 % 02/22/24 1058   Vitals shown include unvalidated device data.    Anesthesia Post Evaluation    Patient location during evaluation: PACU  Patient participation: complete - patient cannot participate  Level of consciousness: sleepy but conscious  Pain management: adequate  Airway patency: patent  Cardiovascular status: acceptable  Respiratory status: acceptable, face mask and oral airway  Hydration status: acceptable  Postoperative Nausea and Vomiting: none      No notable events documented.

## 2024-02-22 NOTE — CARE PLAN
The patient's goals for the shift include      The clinical goals for the shift include Pt will be NPO after midnight for procedure    Problem: Pain  Goal: Takes deep breaths with improved pain control throughout the shift  Outcome: Progressing  Goal: Turns in bed with improved pain control throughout the shift  Outcome: Progressing  Goal: Walks with improved pain control throughout the shift  Outcome: Progressing  Goal: Performs ADL's with improved pain control throughout shift  Outcome: Progressing  Goal: Participates in PT with improved pain control throughout the shift  Outcome: Progressing  Goal: Free from opioid side effects throughout the shift  Outcome: Progressing  Goal: Free from acute confusion related to pain meds throughout the shift  Outcome: Progressing

## 2024-02-22 NOTE — CARE PLAN
The patient's goals for the shift include      The clinical goals for the shift include PT WILL BE SEEN BY PCP/ CONSULTS, RECIEVE PRESCRIBED MEDS AND TREATMENTS

## 2024-02-23 ENCOUNTER — DOCUMENTATION (OUTPATIENT)
Dept: PRIMARY CARE | Facility: CLINIC | Age: 84
End: 2024-02-23
Payer: MEDICARE

## 2024-02-23 NOTE — PROGRESS NOTES
Discharge Facility: [facility]  Discharge Diagnosis: nephrolithiasis  Admission Date: 2/21/24  Discharge Date: 2/22/24    PCP Appointment Date: 2/26/24  Specialist Appointment Date:  229 CT urography  3/12/24 Dr Mendes  [facility] Encounter and Summary: Carolina Hobson is scheduled to see PCP within two business days of DC, no outreach needed at this time. Recommend BMP on 2/26 t reassess renal function.

## 2024-02-26 ENCOUNTER — APPOINTMENT (OUTPATIENT)
Dept: PRIMARY CARE | Facility: CLINIC | Age: 84
End: 2024-02-26
Payer: MEDICARE

## 2024-02-26 ENCOUNTER — LAB (OUTPATIENT)
Dept: LAB | Facility: LAB | Age: 84
End: 2024-02-26
Payer: MEDICARE

## 2024-02-26 DIAGNOSIS — N20.0 NEPHROLITHIASIS: ICD-10-CM

## 2024-02-26 DIAGNOSIS — N18.31 CKD STAGE G3A/A1, GFR 45-59 AND ALBUMIN CREATININE RATIO <30 MG/G (MULTI): ICD-10-CM

## 2024-02-26 LAB
ANION GAP SERPL CALC-SCNC: 13 MMOL/L (ref 10–20)
ATRIAL RATE: 73 BPM
ATRIAL RATE: 97 BPM
BUN SERPL-MCNC: 19 MG/DL (ref 6–23)
CALCIUM SERPL-MCNC: 8.7 MG/DL (ref 8.6–10.3)
CHLORIDE SERPL-SCNC: 103 MMOL/L (ref 98–107)
CO2 SERPL-SCNC: 29 MMOL/L (ref 21–32)
CREAT SERPL-MCNC: 1.28 MG/DL (ref 0.5–1.3)
EGFRCR SERPLBLD CKD-EPI 2021: 56 ML/MIN/1.73M*2
GLUCOSE SERPL-MCNC: 92 MG/DL (ref 74–99)
POTASSIUM SERPL-SCNC: 4 MMOL/L (ref 3.5–5.3)
Q ONSET: 216 MS
Q ONSET: 217 MS
QRS COUNT: 14 BEATS
QRS COUNT: 15 BEATS
QRS DURATION: 78 MS
QRS DURATION: 82 MS
QT INTERVAL: 376 MS
QT INTERVAL: 388 MS
QTC CALCULATION(BAZETT): 454 MS
QTC CALCULATION(BAZETT): 466 MS
QTC FREDERICIA: 427 MS
QTC FREDERICIA: 439 MS
R AXIS: -35 DEGREES
R AXIS: 213 DEGREES
SODIUM SERPL-SCNC: 141 MMOL/L (ref 136–145)
T AXIS: -29 DEGREES
T AXIS: 194 DEGREES
T OFFSET: 405 MS
T OFFSET: 410 MS
VENTRICULAR RATE: 87 BPM
VENTRICULAR RATE: 88 BPM

## 2024-02-26 PROCEDURE — 80048 BASIC METABOLIC PNL TOTAL CA: CPT

## 2024-02-26 PROCEDURE — 36415 COLL VENOUS BLD VENIPUNCTURE: CPT

## 2024-02-27 ENCOUNTER — PREP FOR PROCEDURE (OUTPATIENT)
Dept: UROLOGY | Facility: HOSPITAL | Age: 84
End: 2024-02-27
Payer: MEDICARE

## 2024-02-27 DIAGNOSIS — N20.0 KIDNEY STONE: Primary | ICD-10-CM

## 2024-02-27 RX ORDER — SODIUM CHLORIDE 9 MG/ML
100 INJECTION, SOLUTION INTRAVENOUS CONTINUOUS
Status: CANCELLED | OUTPATIENT
Start: 2024-02-27

## 2024-02-29 ENCOUNTER — APPOINTMENT (OUTPATIENT)
Dept: RADIOLOGY | Facility: CLINIC | Age: 84
End: 2024-02-29
Payer: MEDICARE

## 2024-03-05 ENCOUNTER — TELEPHONE (OUTPATIENT)
Dept: UROLOGY | Facility: CLINIC | Age: 84
End: 2024-03-05
Payer: MEDICARE

## 2024-03-05 NOTE — TELEPHONE ENCOUNTER
Patient wants to know if his fuv with Dr. Matthew is needed if he has his preadmin appointment the day prior. Please contact to discuss, [phone]

## 2024-03-08 ASSESSMENT — CHADS2 SCORE
PRIOR STROKE OR TIA OR THROMBOEMBOLISM: NO
AGE GREATER THAN OR EQUAL TO 75: YES
DIABETES: NO
HYPERTENSION: YES
CHADS2 SCORE: 2
CHF: NO

## 2024-03-08 ASSESSMENT — DUKE ACTIVITY SCORE INDEX (DASI)
CAN YOU DO LIGHT WORK AROUND THE HOUSE LIKE DUSTING OR WASHING DISHES: YES
CAN YOU CLIMB A FLIGHT OF STAIRS OR WALK UP A HILL: YES
CAN YOU WALK A BLOCK OR TWO ON LEVEL GROUND: YES
CAN YOU HAVE SEXUAL RELATIONS: YES
CAN YOU PARTICIPATE IN STRENOUS SPORTS LIKE SWIMMING, SINGLES TENNIS, FOOTBALL, BASKETBALL, OR SKIING: YES
CAN YOU RUN A SHORT DISTANCE: YES
CAN YOU WALK INDOORS, SUCH AS AROUND YOUR HOUSE: YES
CAN YOU PARTICIPATE IN MODERATE RECREATIONAL ACTIVITIES LIKE GOLF, BOWLING, DANCING, DOUBLES TENNIS OR THROWING A BASEBALL OR FOOTBALL: NO
CAN YOU DO MODERATE WORK AROUND THE HOUSE LIKE VACUUMING, SWEEPING FLOORS OR CARRYING GROCERIES: YES
TOTAL_SCORE: 52.2
DASI METS SCORE: 9.2
CAN YOU DO YARD WORK LIKE RAKING LEAVES, WEEDING OR PUSHING A MOWER: YES
CAN YOU DO HEAVY WORK AROUND THE HOUSE LIKE SCRUBBING FLOORS OR LIFTING AND MOVING HEAVY FURNITURE: YES
CAN YOU TAKE CARE OF YOURSELF (EAT, DRESS, BATHE, OR USE TOILET): YES

## 2024-03-08 ASSESSMENT — ACTIVITIES OF DAILY LIVING (ADL): ADL_SCORE: 1

## 2024-03-08 ASSESSMENT — LIFESTYLE VARIABLES: SMOKING_STATUS: NONSMOKER

## 2024-03-11 ENCOUNTER — PRE-ADMISSION TESTING (OUTPATIENT)
Dept: PREADMISSION TESTING | Facility: HOSPITAL | Age: 84
End: 2024-03-11
Payer: MEDICARE

## 2024-03-11 ENCOUNTER — APPOINTMENT (OUTPATIENT)
Dept: PREADMISSION TESTING | Facility: HOSPITAL | Age: 84
End: 2024-03-11
Payer: MEDICARE

## 2024-03-11 ENCOUNTER — LAB (OUTPATIENT)
Dept: LAB | Facility: LAB | Age: 84
End: 2024-03-11
Payer: MEDICARE

## 2024-03-11 VITALS
SYSTOLIC BLOOD PRESSURE: 112 MMHG | OXYGEN SATURATION: 97 % | TEMPERATURE: 97.9 F | RESPIRATION RATE: 18 BRPM | HEART RATE: 94 BPM | DIASTOLIC BLOOD PRESSURE: 88 MMHG | HEIGHT: 64 IN | WEIGHT: 186.51 LBS | BODY MASS INDEX: 31.84 KG/M2

## 2024-03-11 DIAGNOSIS — N20.0 KIDNEY STONE: ICD-10-CM

## 2024-03-11 DIAGNOSIS — Z01.818 PRE-OP TESTING: Primary | ICD-10-CM

## 2024-03-11 LAB
ANION GAP SERPL CALC-SCNC: 16 MMOL/L (ref 10–20)
BUN SERPL-MCNC: 31 MG/DL (ref 6–23)
CALCIUM SERPL-MCNC: 9.5 MG/DL (ref 8.6–10.3)
CHLORIDE SERPL-SCNC: 103 MMOL/L (ref 98–107)
CO2 SERPL-SCNC: 25 MMOL/L (ref 21–32)
CREAT SERPL-MCNC: 1.5 MG/DL (ref 0.5–1.3)
EGFRCR SERPLBLD CKD-EPI 2021: 46 ML/MIN/1.73M*2
ERYTHROCYTE [DISTWIDTH] IN BLOOD BY AUTOMATED COUNT: 14 % (ref 11.5–14.5)
GLUCOSE SERPL-MCNC: 103 MG/DL (ref 74–99)
HCT VFR BLD AUTO: 43.9 % (ref 41–52)
HGB BLD-MCNC: 14.4 G/DL (ref 13.5–17.5)
MCH RBC QN AUTO: 33.3 PG (ref 26–34)
MCHC RBC AUTO-ENTMCNC: 32.8 G/DL (ref 32–36)
MCV RBC AUTO: 102 FL (ref 80–100)
NRBC BLD-RTO: 0 /100 WBCS (ref 0–0)
PLATELET # BLD AUTO: 138 X10*3/UL (ref 150–450)
POTASSIUM SERPL-SCNC: 4.8 MMOL/L (ref 3.5–5.3)
RBC # BLD AUTO: 4.32 X10*6/UL (ref 4.5–5.9)
SODIUM SERPL-SCNC: 139 MMOL/L (ref 136–145)
WBC # BLD AUTO: 6.6 X10*3/UL (ref 4.4–11.3)

## 2024-03-11 PROCEDURE — 93005 ELECTROCARDIOGRAM TRACING: CPT

## 2024-03-11 PROCEDURE — 93010 ELECTROCARDIOGRAM REPORT: CPT | Performed by: INTERNAL MEDICINE

## 2024-03-11 PROCEDURE — 87086 URINE CULTURE/COLONY COUNT: CPT

## 2024-03-11 PROCEDURE — 85027 COMPLETE CBC AUTOMATED: CPT

## 2024-03-11 PROCEDURE — 80048 BASIC METABOLIC PNL TOTAL CA: CPT

## 2024-03-11 PROCEDURE — 99203 OFFICE O/P NEW LOW 30 MIN: CPT | Performed by: NURSE PRACTITIONER

## 2024-03-11 PROCEDURE — 36415 COLL VENOUS BLD VENIPUNCTURE: CPT

## 2024-03-11 ASSESSMENT — PAIN SCALES - GENERAL: PAINLEVEL_OUTOF10: 0 - NO PAIN

## 2024-03-11 ASSESSMENT — PAIN - FUNCTIONAL ASSESSMENT: PAIN_FUNCTIONAL_ASSESSMENT: 0-10

## 2024-03-11 NOTE — CPM/PAT H&P
"CPM/PAT Evaluation       Name: Joce Galeana (Joce Galeana)  /Age: 1940/83 y.o.     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 Endorses dysuria and hematuria and states the stent feels \"uncomfortable\". Otherwise denies any recent illness, fever/chills, chest pains or shortness of breath.     Past Medical History:   Diagnosis Date    Arrhythmia     afib    Atrial fibrillation (CMS/[facility])     BPH (benign prostatic hyperplasia)     Bradycardia, unspecified 2019    Persistent sinus bradycardia    CKD (chronic kidney disease), stage III (CMS/[facility])     Hyperlipidemia     Hypertension     Irregular heart beat     Long term current use of anticoagulant     Nephrolithiasis     Other conditions influencing health status     Skin Cancer    Personal history of other diseases of the circulatory system     History of hypertension    Personal history of other diseases of the circulatory system     History of cardiac disorder    PMR (polymyalgia rheumatica) (CMS/[facility])     Rheumatoid arthritis, unspecified (CMS/[facility])     Rheumatoid arthritis     PCP: Dr. Henning  Cards: Dr. Ibarra  Rheum: Dr. Riley     Past Surgical History:   Procedure Laterality Date    COLONOSCOPY  2014    Complete Colonoscopy    LITHOTRIPSY      ROTATOR CUFF REPAIR  2013    Rotator Cuff Repair    Skin cancer removal  Heel surgery    Patient Sexual activity questions deferred to the physician.    Family History   Problem Relation Name Age of Onset    Hypertension Mother      Heart disease Mother      Skin cancer Mother      Hypertension Father      Heart attack Father         No Known Allergies    Prior to Admission medications    Medication Sig Start Date End Date Taking? Authorizing Provider   acetaminophen (Tylenol Extra Strength) 500 mg tablet Take 2 tablets " (1,000 mg) by mouth 2 times a day. Morning and evening 2/20/17   Historical Provider, MD   acetaminophen (Tylenol) 500 mg tablet Take 1 tablet (500 mg) by mouth once daily at bedtime.    Historical Provider, MD   atorvastatin (Lipitor) 20 mg tablet TAKE 1 TABLET BY MOUTH DAILY  Patient taking differently: Take 1 tablet (20 mg) by mouth once daily at bedtime. 12/4/23   Zainab Henning MD PhD   cholecalciferol (Vitamin D-3) 25 MCG (1000 UT) tablet Take 1 tablet (1,000 Units) by mouth once daily. 2/20/17   Historical Provider, MD   diclofenac sodium (Voltaren) 1 % gel Apply 4.5 inches (4 g) topically 2 times a day as needed (pain).    Historical Provider, MD   Eliquis 5 mg tablet Take 1 tablet (5 mg) by mouth 2 times a day. 1/16/18   Historical Provider, MD   enalapril (Vasotec) 10 mg tablet TAKE 1 TABLET BY MOUTH DAILY 2/1/24   Zainab Henning MD PhD   folic acid (Folvite) 1 mg tablet Take 1 tablet (1 mg) by mouth once daily. 7/20/18   Historical Provider, MD   latanoprost (Xalatan) 0.005 % ophthalmic solution Administer 1 drop into both eyes once daily at bedtime. 2/13/18   Historical Provider, MD   meclizine (Antivert) 25 mg tablet Take 1 tablet (25 mg) by mouth 2 times a day as needed for dizziness. 7/6/23   Historical Provider, MD   menthol (Biofreeze, menthol,) 4 % gel gel Apply 1 Application topically 2 times a day as needed (pain).    Historical Provider, MD   methotrexate (Trexall) 2.5 mg tablet Take 6 tablets (15 mg total) by mouth 1 (one) time per week.  Casey 7/3/12   Historical Provider, MD   metoprolol succinate XL (Toprol-XL) 25 mg 24 hr tablet Take 3 tablets (75 mg) by mouth 2 times a day. 1/16/18   Historical Provider, MD   predniSONE (Deltasone) 1 mg tablet Take 4 tablets (4 mg) by mouth once daily. 7/6/23   Historical Provider, MD   tamsulosin (Flomax) 0.4 mg 24 hr capsule TAKE 1 CAPSULE BY MOUTH  TWICE DAILY  Patient taking differently: Take 1 capsule (0.4 mg) by mouth once daily at  bedtime. 7/27/23   Zainab Henning MD PhD   traMADol (Ultram) 50 mg tablet Take 1 tablet (50 mg) by mouth 2 times a day as needed.    Historical Provider, MD   UNABLE TO FIND Apply 1 Application topically 2 times a day as needed (pain). Med Name: Blue Emu gel    Historical Provider, MD   wheat dextrin (Benefiber Clear SF, dextrin,) 3 gram/3.5 gram powder in packet Take 2 Scoops by mouth once daily. 6/20/17   Historical Provider, MD BEA COOPER   Constitutional: Negative for fever, chills, or sweats   ENMT: Negative for nasal discharge, congestion, ear pain, mouth pain, throat pain   Respiratory: Negative for cough, wheezing, shortness of breath   Cardiac: Negative for chest pain, dyspnea on exertion, palpitations   Gastrointestinal: Negative for nausea, vomiting, diarrhea, constipation, abdominal pain  Genitourinary: Negative for dysuria, flank pain, frequency, hematuria   Musculoskeletal: Negative for decreased ROM, pain, swelling, weakness   Neurological: Negative for dizziness, confusion, headache  Psychiatric: Negative for mood changes   Skin: Negative for itching, rash, ulcer    Hematologic/Lymph: Negative for bruising, easy bleeding  Allergic/Immunologic: Negative itching, sneezing, swelling      Physical Exam  HENT:      Head: Normocephalic.      Mouth/Throat:      Mouth: Mucous membranes are moist.   Eyes:      Extraocular Movements: Extraocular movements intact.   Cardiovascular:      Rate and Rhythm: Normal rate and regular rhythm.   Pulmonary:      Effort: Pulmonary effort is normal.      Breath sounds: Normal breath sounds.   Abdominal:      General: Abdomen is flat.      Palpations: Abdomen is soft.   Musculoskeletal:         General: Normal range of motion.      Cervical back: Normal range of motion.   Skin:     General: Skin is warm and dry.   Neurological:      General: No focal deficit present.      Mental Status: He is alert.   Psychiatric:         Mood and Affect: Mood normal.        BEA  AIRWAY:   Airway:     Neck ROM::  Full   Missing upper teeth    Anesthesia:  Patient denies any anesthesia complications.     Visit Vitals  /88   Pulse 94   Temp 36.6 °C (97.9 °F) (Temporal)   Resp 18       DASI Risk Score      Flowsheet Row Most Recent Value   DASI SCORE 52.2   METS Score (Will be calculated only when all the questions are answered) 9.2          Caprini DVT Assessment      Flowsheet Row Most Recent Value   DVT Score 8   Current Status Major surgery planned, including arthroscopic and laproscopic (1-2 hours)   Age Over 75 years   BMI 31-40 (Obesity)          Modified Frailty Index      Flowsheet Row Most Recent Value   Modified Frailty Index Calculator .0909          CHADS2 Stroke Risk  Current as of 25 minutes ago        4% 3 - 100%: High Risk   2 - 3%: Medium Risk   0 - 2%: Low Risk     Last Change: N/A          This score determines the patient's risk of having a stroke if the patient has atrial fibrillation.          Points Metrics   0 Has Congestive Heart Failure:  No     Patients with congestive heart failure get 1 point.    Current as of 25 minutes ago   1 Has Hypertension:  Yes     Patients with hypertension get 1 point.    Current as of 25 minutes ago   1 Age:  83     Patients who are 75 years of age or older get 1 point.    Current as of 25 minutes ago   0 Has Diabetes:  No     Patients with diabetes get 1 point.    Current as of 25 minutes ago   0 Had Stroke:  No  Had TIA:  No  Had Thromboembolism:  No     Patients who have had a stroke, TIA, or thromboembolism get 2 points.    Current as of 25 minutes ago             Revised Cardiac Risk Index      Flowsheet Row Most Recent Value   Revised Cardiac Risk Calculator 0          Apfel Simplified Score    No data to display       Risk Analysis Index Results This Encounter         3/8/2024  1424             KULKARNI Cancer History: Patient does not indicate history of cancer    Total Risk Analysis Index Score Without Cancer: 39    Total Risk  Analysis Index Score: 39          Stop Bang Score      Flowsheet Row Most Recent Value   Do you snore loudly? 0   Do you often feel tired or fatigued after your sleep? 0   Has anyone ever observed you stop breathing in your sleep? 0   Do you have or are you being treated for high blood pressure? 1   Recent BMI (Calculated) 32.5   Is BMI greater than 35 kg/m2? 0=No   Age older than 50 years old? 1=Yes   Is your neck circumference greater than 17 inches (Male) or 16 inches (Female)? 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 Matthew. Blood work and urine culture ordered. EKG shows atrial fibrillation with v rate of 96 bpm (not new finding for patient). Instructed to seek out for instructions on Eliquis and RA medications for prior to surgery. Otherwise no further orders indicated.     See risk scores as previously documented.

## 2024-03-11 NOTE — H&P (VIEW-ONLY)
"CPM/PAT Evaluation       Name: Joce Galeana (Joce Galeana)  /Age: 1940/83 y.o.     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 Endorses dysuria and hematuria and states the stent feels \"uncomfortable\". Otherwise denies any recent illness, fever/chills, chest pains or shortness of breath.     Past Medical History:   Diagnosis Date    Arrhythmia     afib    Atrial fibrillation (CMS/[facility])     BPH (benign prostatic hyperplasia)     Bradycardia, unspecified 2019    Persistent sinus bradycardia    CKD (chronic kidney disease), stage III (CMS/[facility])     Hyperlipidemia     Hypertension     Irregular heart beat     Long term current use of anticoagulant     Nephrolithiasis     Other conditions influencing health status     Skin Cancer    Personal history of other diseases of the circulatory system     History of hypertension    Personal history of other diseases of the circulatory system     History of cardiac disorder    PMR (polymyalgia rheumatica) (CMS/[facility])     Rheumatoid arthritis, unspecified (CMS/[facility])     Rheumatoid arthritis     PCP: Dr. Henning  Cards: Dr. Ibarra  Rheum: Dr. Riley     Past Surgical History:   Procedure Laterality Date    COLONOSCOPY  2014    Complete Colonoscopy    LITHOTRIPSY      ROTATOR CUFF REPAIR  2013    Rotator Cuff Repair    Skin cancer removal  Heel surgery    Patient Sexual activity questions deferred to the physician.    Family History   Problem Relation Name Age of Onset    Hypertension Mother      Heart disease Mother      Skin cancer Mother      Hypertension Father      Heart attack Father         No Known Allergies    Prior to Admission medications    Medication Sig Start Date End Date Taking? Authorizing Provider   acetaminophen (Tylenol Extra Strength) 500 mg tablet Take 2 tablets " (1,000 mg) by mouth 2 times a day. Morning and evening 2/20/17   Historical Provider, MD   acetaminophen (Tylenol) 500 mg tablet Take 1 tablet (500 mg) by mouth once daily at bedtime.    Historical Provider, MD   atorvastatin (Lipitor) 20 mg tablet TAKE 1 TABLET BY MOUTH DAILY  Patient taking differently: Take 1 tablet (20 mg) by mouth once daily at bedtime. 12/4/23   Zainab Henning MD PhD   cholecalciferol (Vitamin D-3) 25 MCG (1000 UT) tablet Take 1 tablet (1,000 Units) by mouth once daily. 2/20/17   Historical Provider, MD   diclofenac sodium (Voltaren) 1 % gel Apply 4.5 inches (4 g) topically 2 times a day as needed (pain).    Historical Provider, MD   Eliquis 5 mg tablet Take 1 tablet (5 mg) by mouth 2 times a day. 1/16/18   Historical Provider, MD   enalapril (Vasotec) 10 mg tablet TAKE 1 TABLET BY MOUTH DAILY 2/1/24   Zainab Henning MD PhD   folic acid (Folvite) 1 mg tablet Take 1 tablet (1 mg) by mouth once daily. 7/20/18   Historical Provider, MD   latanoprost (Xalatan) 0.005 % ophthalmic solution Administer 1 drop into both eyes once daily at bedtime. 2/13/18   Historical Provider, MD   meclizine (Antivert) 25 mg tablet Take 1 tablet (25 mg) by mouth 2 times a day as needed for dizziness. 7/6/23   Historical Provider, MD   menthol (Biofreeze, menthol,) 4 % gel gel Apply 1 Application topically 2 times a day as needed (pain).    Historical Provider, MD   methotrexate (Trexall) 2.5 mg tablet Take 6 tablets (15 mg total) by mouth 1 (one) time per week.  Casey 7/3/12   Historical Provider, MD   metoprolol succinate XL (Toprol-XL) 25 mg 24 hr tablet Take 3 tablets (75 mg) by mouth 2 times a day. 1/16/18   Historical Provider, MD   predniSONE (Deltasone) 1 mg tablet Take 4 tablets (4 mg) by mouth once daily. 7/6/23   Historical Provider, MD   tamsulosin (Flomax) 0.4 mg 24 hr capsule TAKE 1 CAPSULE BY MOUTH  TWICE DAILY  Patient taking differently: Take 1 capsule (0.4 mg) by mouth once daily at  bedtime. 7/27/23   Zainab Henning MD PhD   traMADol (Ultram) 50 mg tablet Take 1 tablet (50 mg) by mouth 2 times a day as needed.    Historical Provider, MD   UNABLE TO FIND Apply 1 Application topically 2 times a day as needed (pain). Med Name: Blue Emu gel    Historical Provider, MD   wheat dextrin (Benefiber Clear SF, dextrin,) 3 gram/3.5 gram powder in packet Take 2 Scoops by mouth once daily. 6/20/17   Historical Provider, MD BEA COOPER   Constitutional: Negative for fever, chills, or sweats   ENMT: Negative for nasal discharge, congestion, ear pain, mouth pain, throat pain   Respiratory: Negative for cough, wheezing, shortness of breath   Cardiac: Negative for chest pain, dyspnea on exertion, palpitations   Gastrointestinal: Negative for nausea, vomiting, diarrhea, constipation, abdominal pain  Genitourinary: Negative for dysuria, flank pain, frequency, hematuria   Musculoskeletal: Negative for decreased ROM, pain, swelling, weakness   Neurological: Negative for dizziness, confusion, headache  Psychiatric: Negative for mood changes   Skin: Negative for itching, rash, ulcer    Hematologic/Lymph: Negative for bruising, easy bleeding  Allergic/Immunologic: Negative itching, sneezing, swelling      Physical Exam  HENT:      Head: Normocephalic.      Mouth/Throat:      Mouth: Mucous membranes are moist.   Eyes:      Extraocular Movements: Extraocular movements intact.   Cardiovascular:      Rate and Rhythm: Normal rate and regular rhythm.   Pulmonary:      Effort: Pulmonary effort is normal.      Breath sounds: Normal breath sounds.   Abdominal:      General: Abdomen is flat.      Palpations: Abdomen is soft.   Musculoskeletal:         General: Normal range of motion.      Cervical back: Normal range of motion.   Skin:     General: Skin is warm and dry.   Neurological:      General: No focal deficit present.      Mental Status: He is alert.   Psychiatric:         Mood and Affect: Mood normal.        BEA  AIRWAY:   Airway:     Neck ROM::  Full   Missing upper teeth    Anesthesia:  Patient denies any anesthesia complications.     Visit Vitals  /88   Pulse 94   Temp 36.6 °C (97.9 °F) (Temporal)   Resp 18       DASI Risk Score      Flowsheet Row Most Recent Value   DASI SCORE 52.2   METS Score (Will be calculated only when all the questions are answered) 9.2          Caprini DVT Assessment      Flowsheet Row Most Recent Value   DVT Score 8   Current Status Major surgery planned, including arthroscopic and laproscopic (1-2 hours)   Age Over 75 years   BMI 31-40 (Obesity)          Modified Frailty Index      Flowsheet Row Most Recent Value   Modified Frailty Index Calculator .0909          CHADS2 Stroke Risk  Current as of 25 minutes ago        4% 3 - 100%: High Risk   2 - 3%: Medium Risk   0 - 2%: Low Risk     Last Change: N/A          This score determines the patient's risk of having a stroke if the patient has atrial fibrillation.          Points Metrics   0 Has Congestive Heart Failure:  No     Patients with congestive heart failure get 1 point.    Current as of 25 minutes ago   1 Has Hypertension:  Yes     Patients with hypertension get 1 point.    Current as of 25 minutes ago   1 Age:  83     Patients who are 75 years of age or older get 1 point.    Current as of 25 minutes ago   0 Has Diabetes:  No     Patients with diabetes get 1 point.    Current as of 25 minutes ago   0 Had Stroke:  No  Had TIA:  No  Had Thromboembolism:  No     Patients who have had a stroke, TIA, or thromboembolism get 2 points.    Current as of 25 minutes ago             Revised Cardiac Risk Index      Flowsheet Row Most Recent Value   Revised Cardiac Risk Calculator 0          Apfel Simplified Score    No data to display       Risk Analysis Index Results This Encounter         3/8/2024  1424             KULKARNI Cancer History: Patient does not indicate history of cancer    Total Risk Analysis Index Score Without Cancer: 39    Total Risk  Analysis Index Score: 39          Stop Bang Score      Flowsheet Row Most Recent Value   Do you snore loudly? 0   Do you often feel tired or fatigued after your sleep? 0   Has anyone ever observed you stop breathing in your sleep? 0   Do you have or are you being treated for high blood pressure? 1   Recent BMI (Calculated) 32.5   Is BMI greater than 35 kg/m2? 0=No   Age older than 50 years old? 1=Yes   Is your neck circumference greater than 17 inches (Male) or 16 inches (Female)? 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 Matthew. Blood work and urine culture ordered. EKG shows atrial fibrillation with v rate of 96 bpm (not new finding for patient). Instructed to seek out for instructions on Eliquis and RA medications for prior to surgery. Otherwise no further orders indicated.     See risk scores as previously documented.

## 2024-03-11 NOTE — PREPROCEDURE INSTRUCTIONS
Medication List            Accurate as of March 11, 2024  1:39 PM. Always use your most recent med list.                atorvastatin 20 mg tablet  Commonly known as: Lipitor  TAKE 1 TABLET BY MOUTH DAILY  Medication Adjustments for Surgery: Other (Comment)  Notes to patient: May take the morning of surgery if this medication is prescribed to take in the mornings     Benefiber Clear SF (dextrin) 3 gram/3.5 gram powder in packet  Generic drug: wheat dextrin  Medication Adjustments for Surgery: Stop 1 day before surgery     Biofreeze (menthol) 4 % gel gel  Generic drug: menthol  Medication Adjustments for Surgery: Continue until night before surgery     cholecalciferol 25 MCG (1000 UT) tablet  Commonly known as: Vitamin D-3  Medication Adjustments for Surgery: Stop 7 days before surgery     Eliquis 5 mg tablet  Generic drug: apixaban  Medication Adjustments for Surgery: Other (Comment)  Notes to patient: Coordinate with prescribing provider for further instructions on this medications prior to surgery.     enalapril 10 mg tablet  Commonly known as: Vasotec  TAKE 1 TABLET BY MOUTH DAILY  Medication Adjustments for Surgery: Other (Comment)  Notes to patient: HOLD any evening dose the night before the day of surgery  HOLD the day of surgery     folic acid 1 mg tablet  Commonly known as: Folvite  Medication Adjustments for Surgery: Stop 7 days before surgery     latanoprost 0.005 % ophthalmic solution  Commonly known as: Xalatan  Medication Adjustments for Surgery: Continue until night before surgery     meclizine 25 mg tablet  Commonly known as: Antivert  Medication Adjustments for Surgery: Take morning of surgery with sip of water, no other fluids     methotrexate 2.5 mg tablet  Commonly known as: Trexall  Medication Adjustments for Surgery: Other (Comment)  Notes to patient: Coordinate with prescribing provider for further instructions on this medications prior to surgery.     metoprolol succinate XL 25 mg 24 hr  tablet  Commonly known as: Toprol-XL  Medication Adjustments for Surgery: Take morning of surgery with sip of water, no other fluids     predniSONE 1 mg tablet  Commonly known as: Deltasone  Medication Adjustments for Surgery: Other (Comment)  Notes to patient: Coordinate with prescribing provider for further instructions on this medications prior to surgery.     tamsulosin 0.4 mg 24 hr capsule  Commonly known as: Flomax  TAKE 1 CAPSULE BY MOUTH  TWICE DAILY  Medication Adjustments for Surgery: Take morning of surgery with sip of water, no other fluids     traMADol 50 mg tablet  Commonly known as: Ultram  Medication Adjustments for Surgery: Take morning of surgery with sip of water, no other fluids     * acetaminophen 500 mg tablet  Commonly known as: Tylenol  Medication Adjustments for Surgery: Other (Comment)  Notes to patient: May use the morning of surgery if needed     * Tylenol Extra Strength 500 mg tablet  Generic drug: acetaminophen  Medication Adjustments for Surgery: Other (Comment)  Notes to patient: May use the morning of surgery if needed     UNABLE TO FIND     Voltaren 1 % gel  Generic drug: diclofenac sodium  Medication Adjustments for Surgery: Stop 7 days before surgery           * This list has 2 medication(s) that are the same as other medications prescribed for you. Read the directions carefully, and ask your doctor or other care provider to review them with you.                             PRE-OPERATIVE INSTRUCTIONS    You will receive notification one business day prior to your procedure to confirm your arrival time. It is important that you answer your phone and/or check your messages during this time. If you do not hear from the surgery center by 5 pm. the day before your procedure, please call [phone].     Please enter the building through the Outpatient entrance and take the elevator off the lobby to the 2nd floor then check in at the Outpatient Surgery desk on the 2nd  floor.    INSTRUCTIONS:  Talk to your surgeon for instructions if you should stop your aspirin, blood thinner, or diabetes medicines.  DO NOT take any multivitamins or over the counter supplements for 7-10 days before surgery.  If not being admitted, you must have an adult immediately available to drive you home after surgery. We also highly recommend you have someone stay with you for the entire day and night of your surgery.  For children having surgery, a parent or legal guardian must accompany them to the surgery center. If this is not possible, please call [phone] to make additional arrangements.  For adults who are unable to consent or make medical decisions for themselves, a legal guardian or Power of  must accompany them to the surgery center. If this is not possible, please call [phone] to make additional arrangements.  Wear comfortable, loose fitting clothing.  All jewelry and piercings must be removed. If you are unable to remove an item or have a dermal piercing, please be sure to tell the nurse when you arrive for surgery.  Nail polish and make-up must be removed.  Avoid smoking or consuming alcohol for 24 hours before surgery.  To help prevent infection, please take a shower/bath and wash your hair the night before and/or morning of surgery (or follow other specific bathing instructions provided).    Preoperative Fasting Guidelines    Why must I stop eating and drinking near surgery time?  With sedation, food or liquid in your stomach can enter your lungs causing serious complications  Increases nausea and vomiting    When do I need to stop eating and drinking before my surgery?  Do not eat any solid food after midnight the night before your surgery/procedure.  You may have up to TEN ounces of clear liquid until TWO hours before your instructed arrival time to the hospital.  This includes water, black tea/coffee, (no milk or cream) apple juice, and electrolyte drinks (Gatorade).   You  may chew gum until TWO hours before your surgery/procedure    If you have any questions or concerns, please call Pre-Admission Testing at [phone].           Home Preoperative Antibacterial Shower with Chlorhexidine gluconate (CHG)     What is a home preoperative antibacterial shower?  This shower is a way of cleaning the skin with a germ killing solution before surgery. The solution contains chlorhexidine gluconate, commonly known as CHG. CHG is a skin cleanser with germ killing ability. Let your doctor know if you are allergic to chlorhexidine.    Why do I need to take a preoperative antibacterial shower?  Skin is not sterile. It is best to try to make your skin as free of germs as possible before surgery. Proper cleansing with a germ killing soap before surgery can lower the number of germs on your skin. This helps to reduce the risk of infection at the surgical site. Following the instructions listed below will help you prepare your skin for surgery.    How do I use the solution?  Begin using your CHG soap the night before and again the morning of your procedure.   Do not shave the day before or day of surgery.  Remove all jewelry until after surgery. Take off rings and take out all body-piercing jewelry.  Wash your face and hair with normal soap and shampoo before you use the CHG soap.  Apply the CHG solution to a clean wet washcloth. Move away from the water to avoid premature rinsing of the CHG soap as you are applying. Firmly lather your entire body from the neck down. Do not use CHG on your face, eyes, ears, or genitals.   Pay special attention to the area where your incisions will be located.  Do not scrub your skin too hard.  It is important to allow the CHG soap to sit on your skin for 3-5 minutes.  Rinse the solution off your body completely. Do not wash with your normal soap after the CHG soap solution.  Pat yourself dry with a clean, soft towel.  Do not apply powders, lotions or deodorants as  these might block how the CHG soap works.   Dress in clean clothing.  Be sure to sleep with clean, freshly laundered sheets.  Be aware that CHG can cause stains on fabric. Rinse your washcloth and other linens that have contact with CHG completely. Use only non-chlorine detergents to launder the items used.

## 2024-03-12 ENCOUNTER — OFFICE VISIT (OUTPATIENT)
Dept: UROLOGY | Facility: CLINIC | Age: 84
End: 2024-03-12
Payer: MEDICARE

## 2024-03-12 VITALS
TEMPERATURE: 97.9 F | SYSTOLIC BLOOD PRESSURE: 117 MMHG | DIASTOLIC BLOOD PRESSURE: 77 MMHG | HEART RATE: 118 BPM | BODY MASS INDEX: 32.44 KG/M2 | WEIGHT: 189 LBS

## 2024-03-12 DIAGNOSIS — N20.0 KIDNEY STONE: Primary | ICD-10-CM

## 2024-03-12 LAB — BACTERIA UR CULT: NORMAL

## 2024-03-12 PROCEDURE — 99214 OFFICE O/P EST MOD 30 MIN: CPT | Performed by: UROLOGY

## 2024-03-12 PROCEDURE — 1036F TOBACCO NON-USER: CPT | Performed by: UROLOGY

## 2024-03-12 PROCEDURE — 1126F AMNT PAIN NOTED NONE PRSNT: CPT | Performed by: UROLOGY

## 2024-03-12 PROCEDURE — 1160F RVW MEDS BY RX/DR IN RCRD: CPT | Performed by: UROLOGY

## 2024-03-12 PROCEDURE — G2211 COMPLEX E/M VISIT ADD ON: HCPCS | Performed by: UROLOGY

## 2024-03-12 PROCEDURE — 3078F DIAST BP <80 MM HG: CPT | Performed by: UROLOGY

## 2024-03-12 PROCEDURE — 3074F SYST BP LT 130 MM HG: CPT | Performed by: UROLOGY

## 2024-03-12 PROCEDURE — 1159F MED LIST DOCD IN RCRD: CPT | Performed by: UROLOGY

## 2024-03-12 PROCEDURE — 1157F ADVNC CARE PLAN IN RCRD: CPT | Performed by: UROLOGY

## 2024-03-12 NOTE — PROGRESS NOTES
Subjective   Patient ID: Joce Galeaan is a 83 y.o. male who presents for Follow-up. Patient is s/p Cystoscopy and left retrograde pyelogram, and Left ureteral stent placement on 2/22/24.    HPI  The patient is accompanied by his wife, Raissa.   Patient relates that he has urinary frequency, every 40 minutes in the daytime, and nocturia 3-4 times/night. Patient relates that he has continual hematuria. Patient relates that he drinks water with lemon juice added.     CT Urogram  IMPRESSION:  1.  21 mm proximal left ureteral calculus causing moderate  hydronephrosis. A few additional intrarenal calculi bilaterally  measuring up to 14 mm.  2. Multiple benign cortical cysts bilaterally.    Review of Systems  A 12 system review was completed and is negative with the exception of those signs and symptoms noted in the history of present illness.    Objective   Physical Exam  General: in NAD, appears stated age  Head: normocephalic, atraumatic  Respiratory: normal effort, no use of accessory muscles  Cardiovascular: no edema noted  Skin: normal turgor, no rashes  Neurologic: grossly intact, oriented to person/place/time  Psychiatric: mode and affect appropriate     Assessment/Plan   Problem List Items Addressed This Visit    None    We discussed the CT results. We discussed the role of the stent in the future ureteroscopy and laser lithotripsy. His stent will be replace during surgery and removed about 1 week after surgery. All questions and concerns were addressed. Patient verbalizes understanding and has no other questions at this time. Patient is scheduled for surgery on March 25. He will schedule the stent removal for 1 week after. I recommended that he continue to drink lots of water.     Follow-up in 1 month for continued management of nephrolithiasis.     Scribe Attestation  By signing my name below, I, Ct Thomas   attest that this documentation has been prepared under the direction and in the presence of  Lamberto Matthew MD.

## 2024-03-12 NOTE — H&P (VIEW-ONLY)
Subjective   Patient ID: Joce Galeana is a 83 y.o. male who presents for Follow-up. Patient is s/p Cystoscopy and left retrograde pyelogram, and Left ureteral stent placement on 2/22/24.    HPI  The patient is accompanied by his wife, Raissa.   Patient relates that he has urinary frequency, every 40 minutes in the daytime, and nocturia 3-4 times/night. Patient relates that he has continual hematuria. Patient relates that he drinks water with lemon juice added.     CT Urogram  IMPRESSION:  1.  21 mm proximal left ureteral calculus causing moderate  hydronephrosis. A few additional intrarenal calculi bilaterally  measuring up to 14 mm.  2. Multiple benign cortical cysts bilaterally.    Review of Systems  A 12 system review was completed and is negative with the exception of those signs and symptoms noted in the history of present illness.    Objective   Physical Exam  General: in NAD, appears stated age  Head: normocephalic, atraumatic  Respiratory: normal effort, no use of accessory muscles  Cardiovascular: no edema noted  Skin: normal turgor, no rashes  Neurologic: grossly intact, oriented to person/place/time  Psychiatric: mode and affect appropriate     Assessment/Plan   Problem List Items Addressed This Visit    None    We discussed the CT results. We discussed the role of the stent in the future ureteroscopy and laser lithotripsy. His stent will be replace during surgery and removed about 1 week after surgery. All questions and concerns were addressed. Patient verbalizes understanding and has no other questions at this time. Patient is scheduled for surgery on March 25. He will schedule the stent removal for 1 week after. I recommended that he continue to drink lots of water.     Follow-up in 1 month for continued management of nephrolithiasis.     Scribe Attestation  By signing my name below, I, Ct Thomas   attest that this documentation has been prepared under the direction and in the presence of  Lamberto Matthew MD.

## 2024-03-25 ENCOUNTER — ANESTHESIA EVENT (OUTPATIENT)
Dept: OPERATING ROOM | Facility: HOSPITAL | Age: 84
End: 2024-03-25
Payer: MEDICARE

## 2024-03-25 ENCOUNTER — HOSPITAL ENCOUNTER (OUTPATIENT)
Facility: HOSPITAL | Age: 84
Setting detail: OUTPATIENT SURGERY
Discharge: HOME | End: 2024-03-25
Attending: UROLOGY | Admitting: UROLOGY
Payer: MEDICARE

## 2024-03-25 ENCOUNTER — ANESTHESIA (OUTPATIENT)
Dept: OPERATING ROOM | Facility: HOSPITAL | Age: 84
End: 2024-03-25
Payer: MEDICARE

## 2024-03-25 VITALS
OXYGEN SATURATION: 95 % | SYSTOLIC BLOOD PRESSURE: 150 MMHG | TEMPERATURE: 97.2 F | WEIGHT: 182 LBS | DIASTOLIC BLOOD PRESSURE: 82 MMHG | HEIGHT: 64 IN | RESPIRATION RATE: 18 BRPM | HEART RATE: 70 BPM | BODY MASS INDEX: 31.07 KG/M2

## 2024-03-25 DIAGNOSIS — N20.0 KIDNEY STONE: ICD-10-CM

## 2024-03-25 DIAGNOSIS — N20.0 NEPHROLITHIASIS: ICD-10-CM

## 2024-03-25 DIAGNOSIS — G89.18 ACUTE POST-OPERATIVE PAIN: Primary | ICD-10-CM

## 2024-03-25 PROCEDURE — 7100000001 HC RECOVERY ROOM TIME - INITIAL BASE CHARGE: Performed by: UROLOGY

## 2024-03-25 PROCEDURE — C1758 CATHETER, URETERAL: HCPCS | Performed by: UROLOGY

## 2024-03-25 PROCEDURE — 3700000001 HC GENERAL ANESTHESIA TIME - INITIAL BASE CHARGE: Performed by: UROLOGY

## 2024-03-25 PROCEDURE — 74420 UROGRAPHY RTRGR +-KUB: CPT | Performed by: UROLOGY

## 2024-03-25 PROCEDURE — C1894 INTRO/SHEATH, NON-LASER: HCPCS | Performed by: UROLOGY

## 2024-03-25 PROCEDURE — 52356 CYSTO/URETERO W/LITHOTRIPSY: CPT | Performed by: UROLOGY

## 2024-03-25 PROCEDURE — A52356 PR CYSTO/URETERO W/LITHOTRIPSY  AND INDWELL STENT INSRT: Performed by: ANESTHESIOLOGY

## 2024-03-25 PROCEDURE — 2500000005 HC RX 250 GENERAL PHARMACY W/O HCPCS: Performed by: ANESTHESIOLOGIST ASSISTANT

## 2024-03-25 PROCEDURE — 2500000004 HC RX 250 GENERAL PHARMACY W/ HCPCS (ALT 636 FOR OP/ED): Performed by: UROLOGY

## 2024-03-25 PROCEDURE — 3600000009 HC OR TIME - EACH INCREMENTAL 1 MINUTE - PROCEDURE LEVEL FOUR: Performed by: UROLOGY

## 2024-03-25 PROCEDURE — 7100000010 HC PHASE TWO TIME - EACH INCREMENTAL 1 MINUTE: Performed by: UROLOGY

## 2024-03-25 PROCEDURE — C2617 STENT, NON-COR, TEM W/O DEL: HCPCS | Performed by: UROLOGY

## 2024-03-25 PROCEDURE — 7100000002 HC RECOVERY ROOM TIME - EACH INCREMENTAL 1 MINUTE: Performed by: UROLOGY

## 2024-03-25 PROCEDURE — 3700000002 HC GENERAL ANESTHESIA TIME - EACH INCREMENTAL 1 MINUTE: Performed by: UROLOGY

## 2024-03-25 PROCEDURE — 99100 ANES PT EXTEME AGE<1 YR&>70: CPT | Performed by: ANESTHESIOLOGY

## 2024-03-25 PROCEDURE — A52356 PR CYSTO/URETERO W/LITHOTRIPSY  AND INDWELL STENT INSRT: Performed by: ANESTHESIOLOGIST ASSISTANT

## 2024-03-25 PROCEDURE — 7100000009 HC PHASE TWO TIME - INITIAL BASE CHARGE: Performed by: UROLOGY

## 2024-03-25 PROCEDURE — 2500000004 HC RX 250 GENERAL PHARMACY W/ HCPCS (ALT 636 FOR OP/ED): Performed by: ANESTHESIOLOGIST ASSISTANT

## 2024-03-25 PROCEDURE — 82365 CALCULUS SPECTROSCOPY: CPT | Performed by: UROLOGY

## 2024-03-25 PROCEDURE — 2780000003 HC OR 278 NO HCPCS: Performed by: UROLOGY

## 2024-03-25 PROCEDURE — C1769 GUIDE WIRE: HCPCS | Performed by: UROLOGY

## 2024-03-25 PROCEDURE — 3600000004 HC OR TIME - INITIAL BASE CHARGE - PROCEDURE LEVEL FOUR: Performed by: UROLOGY

## 2024-03-25 PROCEDURE — 2720000007 HC OR 272 NO HCPCS: Performed by: UROLOGY

## 2024-03-25 DEVICE — URETERAL STENT
Type: IMPLANTABLE DEVICE | Site: URETER | Status: FUNCTIONAL
Brand: PERCUFLEX™ PLUS

## 2024-03-25 RX ORDER — SODIUM CHLORIDE, SODIUM LACTATE, POTASSIUM CHLORIDE, CALCIUM CHLORIDE 600; 310; 30; 20 MG/100ML; MG/100ML; MG/100ML; MG/100ML
100 INJECTION, SOLUTION INTRAVENOUS CONTINUOUS
Status: DISCONTINUED | OUTPATIENT
Start: 2024-03-25 | End: 2024-03-25 | Stop reason: HOSPADM

## 2024-03-25 RX ORDER — FENTANYL CITRATE 50 UG/ML
50 INJECTION, SOLUTION INTRAMUSCULAR; INTRAVENOUS EVERY 5 MIN PRN
Status: DISCONTINUED | OUTPATIENT
Start: 2024-03-25 | End: 2024-03-25 | Stop reason: HOSPADM

## 2024-03-25 RX ORDER — SODIUM CHLORIDE 9 MG/ML
100 INJECTION, SOLUTION INTRAVENOUS CONTINUOUS
Status: DISCONTINUED | OUTPATIENT
Start: 2024-03-25 | End: 2024-03-25 | Stop reason: HOSPADM

## 2024-03-25 RX ORDER — CIPROFLOXACIN 500 MG/1
500 TABLET ORAL 2 TIMES DAILY
Qty: 6 TABLET | Refills: 0 | Status: SHIPPED | OUTPATIENT
Start: 2024-03-25 | End: 2024-03-28

## 2024-03-25 RX ORDER — SODIUM CHLORIDE, SODIUM LACTATE, POTASSIUM CHLORIDE, CALCIUM CHLORIDE 600; 310; 30; 20 MG/100ML; MG/100ML; MG/100ML; MG/100ML
INJECTION, SOLUTION INTRAVENOUS CONTINUOUS PRN
Status: DISCONTINUED | OUTPATIENT
Start: 2024-03-25 | End: 2024-03-25

## 2024-03-25 RX ORDER — PROPOFOL 10 MG/ML
INJECTION, EMULSION INTRAVENOUS AS NEEDED
Status: DISCONTINUED | OUTPATIENT
Start: 2024-03-25 | End: 2024-03-25

## 2024-03-25 RX ORDER — ONDANSETRON HYDROCHLORIDE 2 MG/ML
4 INJECTION, SOLUTION INTRAVENOUS ONCE AS NEEDED
Status: DISCONTINUED | OUTPATIENT
Start: 2024-03-25 | End: 2024-03-25 | Stop reason: HOSPADM

## 2024-03-25 RX ORDER — LABETALOL HYDROCHLORIDE 5 MG/ML
5 INJECTION, SOLUTION INTRAVENOUS ONCE AS NEEDED
Status: DISCONTINUED | OUTPATIENT
Start: 2024-03-25 | End: 2024-03-25 | Stop reason: HOSPADM

## 2024-03-25 RX ORDER — HYDRALAZINE HYDROCHLORIDE 20 MG/ML
5 INJECTION INTRAMUSCULAR; INTRAVENOUS EVERY 30 MIN PRN
Status: DISCONTINUED | OUTPATIENT
Start: 2024-03-25 | End: 2024-03-25 | Stop reason: HOSPADM

## 2024-03-25 RX ORDER — FENTANYL CITRATE 50 UG/ML
INJECTION, SOLUTION INTRAMUSCULAR; INTRAVENOUS AS NEEDED
Status: DISCONTINUED | OUTPATIENT
Start: 2024-03-25 | End: 2024-03-25

## 2024-03-25 RX ORDER — ONDANSETRON HYDROCHLORIDE 2 MG/ML
INJECTION, SOLUTION INTRAVENOUS AS NEEDED
Status: DISCONTINUED | OUTPATIENT
Start: 2024-03-25 | End: 2024-03-25

## 2024-03-25 RX ORDER — OXYCODONE HYDROCHLORIDE 5 MG/1
5 TABLET ORAL EVERY 6 HOURS PRN
Qty: 6 TABLET | Refills: 0 | Status: SHIPPED | OUTPATIENT
Start: 2024-03-25 | End: 2024-05-24 | Stop reason: ALTCHOICE

## 2024-03-25 RX ORDER — LIDOCAINE HYDROCHLORIDE 10 MG/ML
0.1 INJECTION, SOLUTION EPIDURAL; INFILTRATION; INTRACAUDAL; PERINEURAL ONCE
Status: CANCELLED | OUTPATIENT
Start: 2024-03-25 | End: 2024-03-25

## 2024-03-25 RX ORDER — ALBUTEROL SULFATE 0.83 MG/ML
2.5 SOLUTION RESPIRATORY (INHALATION) ONCE AS NEEDED
Status: DISCONTINUED | OUTPATIENT
Start: 2024-03-25 | End: 2024-03-25 | Stop reason: HOSPADM

## 2024-03-25 RX ORDER — OXYCODONE HYDROCHLORIDE 5 MG/1
5 TABLET ORAL EVERY 4 HOURS PRN
Status: DISCONTINUED | OUTPATIENT
Start: 2024-03-25 | End: 2024-03-25 | Stop reason: HOSPADM

## 2024-03-25 RX ORDER — LIDOCAINE HYDROCHLORIDE 20 MG/ML
INJECTION, SOLUTION EPIDURAL; INFILTRATION; INTRACAUDAL; PERINEURAL AS NEEDED
Status: DISCONTINUED | OUTPATIENT
Start: 2024-03-25 | End: 2024-03-25

## 2024-03-25 RX ORDER — CEFAZOLIN SODIUM 2 G/100ML
2 INJECTION, SOLUTION INTRAVENOUS ONCE
Status: COMPLETED | OUTPATIENT
Start: 2024-03-25 | End: 2024-03-25

## 2024-03-25 RX ORDER — PHENYLEPHRINE HCL IN 0.9% NACL 1 MG/10 ML
SYRINGE (ML) INTRAVENOUS AS NEEDED
Status: DISCONTINUED | OUTPATIENT
Start: 2024-03-25 | End: 2024-03-25

## 2024-03-25 RX ADMIN — SODIUM CHLORIDE, POTASSIUM CHLORIDE, SODIUM LACTATE AND CALCIUM CHLORIDE: 600; 310; 30; 20 INJECTION, SOLUTION INTRAVENOUS at 14:26

## 2024-03-25 RX ADMIN — LIDOCAINE HYDROCHLORIDE 60 MG: 20 INJECTION, SOLUTION EPIDURAL; INFILTRATION; INTRACAUDAL; PERINEURAL at 14:42

## 2024-03-25 RX ADMIN — DEXAMETHASONE SODIUM PHOSPHATE 4 MG: 4 INJECTION, SOLUTION INTRAMUSCULAR; INTRAVENOUS at 14:47

## 2024-03-25 RX ADMIN — SODIUM CHLORIDE, POTASSIUM CHLORIDE, SODIUM LACTATE AND CALCIUM CHLORIDE: 600; 310; 30; 20 INJECTION, SOLUTION INTRAVENOUS at 15:38

## 2024-03-25 RX ADMIN — CEFAZOLIN SODIUM 2 G: 2 INJECTION, SOLUTION INTRAVENOUS at 14:47

## 2024-03-25 RX ADMIN — PROPOFOL 20 MG: 10 INJECTION, EMULSION INTRAVENOUS at 14:54

## 2024-03-25 RX ADMIN — FENTANYL CITRATE 25 MCG: 50 INJECTION, SOLUTION INTRAMUSCULAR; INTRAVENOUS at 14:41

## 2024-03-25 RX ADMIN — FENTANYL CITRATE 25 MCG: 50 INJECTION, SOLUTION INTRAMUSCULAR; INTRAVENOUS at 15:02

## 2024-03-25 RX ADMIN — PROPOFOL 120 MG: 10 INJECTION, EMULSION INTRAVENOUS at 14:42

## 2024-03-25 RX ADMIN — Medication 100 MCG: at 15:18

## 2024-03-25 RX ADMIN — FENTANYL CITRATE 25 MCG: 50 INJECTION, SOLUTION INTRAMUSCULAR; INTRAVENOUS at 16:06

## 2024-03-25 RX ADMIN — ONDANSETRON 4 MG: 2 INJECTION INTRAMUSCULAR; INTRAVENOUS at 15:41

## 2024-03-25 RX ADMIN — FENTANYL CITRATE 25 MCG: 50 INJECTION, SOLUTION INTRAMUSCULAR; INTRAVENOUS at 14:55

## 2024-03-25 SDOH — HEALTH STABILITY: MENTAL HEALTH: CURRENT SMOKER: 0

## 2024-03-25 ASSESSMENT — PAIN - FUNCTIONAL ASSESSMENT
PAIN_FUNCTIONAL_ASSESSMENT: 0-10

## 2024-03-25 ASSESSMENT — PAIN SCALES - GENERAL
PAINLEVEL_OUTOF10: 0 - NO PAIN

## 2024-03-25 NOTE — ANESTHESIA PREPROCEDURE EVALUATION
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/g (CMS/HCC)   (+) Impaired renal function   (+) Kidney stone   (+) Nephrolithiasis      Neuro/Psych   (+) Right sciatic nerve pain      Hematology   (+) Anemia      Musculoskeletal   (+) Rheumatoid arthritis (CMS/HCC)      Infectious Disease   (+) Acute UTI   (+) Upper respiratory infection      Other   (+) Rheumatoid arthritis (CMS/HCC)       Clinical information reviewed:   Tobacco  Allergies  Meds   Med Hx  Surg Hx   Fam Hx  Soc Hx        NPO Detail:  NPO/Void Status  Date of Last Liquid: 03/24/24  Time of Last Liquid: 1900  Date of Last Solid: 03/24/24  Time of Last Solid: 1800  Time of Last Void: 1127         Physical Exam    Airway  Mallampati: II     Cardiovascular   Rate: abnormal     Dental    Pulmonary    Abdominal            Anesthesia Plan    History of general anesthesia?: yes  History of complications of general anesthesia?: no    ASA 3     general     The patient is not a current smoker.    intravenous induction   Postoperative administration of opioids is intended.  Anesthetic plan and risks discussed with patient.  Use of blood products discussed with patient who.    Plan discussed with attending.

## 2024-03-25 NOTE — DISCHARGE INSTRUCTIONS
DEPARTMENT OF Urology  DISCHARGE INSTRUCTIONS Ureteroscopy Laser Lithotripsy  Outpatient Surgery    C O N F I D E N T I A L   I N F O R M A T I O N    Joce Galeana    Call [phone] during regular daytime business hours (8:00 am - 5:00 pm) and after 5:00 pm ask for the Urology resident with any questions or concerns.    If it is a life-threatening situation, proceed to the nearest emergency department.        Follow-up appointment: We will schedule second procedure in about 2 weeks.    Thank you for the opportunity to care for you today.  Your health and healing are very important to us.  We hope we made you feel as comfortable as possible and are committed to your recovery and continued well-being.      The following is a brief overview of your Ureteroscopy Laser Lithotripsy procedure today. Some of the information contained on this summary may be confidential.  This information should be kept in your records and should be shared with your regular doctor.    Physicians:   Dr. Matthew    Procedure performed: Lasering of your kidney stone    What to Expect During your Recovery and Home Care  Anesthesia Side Effects   You received anesthesia today.  You may feel sleepy, tired, or have a sore throat.   You may also feel drowsiness, dizziness, or inability to think clearly.  For your safety, do not drive, drink alcoholic beverages, take any unprescribed medication or make any important decisions for 24 hours.  A responsible adult should be with you for 24 hours.        Activity and Recovery    No heavy lifting today. Rest for the next 24 hours.    Pain Control  Unfortunately, you may experience pain after your procedure.  Frequency and urgency to urinate and mild discomfort are expected. Adequate pain management can include alternative measures to help ease your pain and that can include over the counter Tylenol/ibuprofen and a heating pad or oxycodone which can be taken as prescribed as needed for breakthrough  pain. Do not take more than 4,000mg of Tylenol in a 24-hour period.      You may have also been prescribed Pyridium (for burning sensation) and Ditropan(for bladder spasms) for pain control. Pyridium will turn your urine bright orange.    Nausea/Vomiting   Clear liquids are best tolerated at first. Start slow, advance your diet as tolerated to normal foods. Avoid spicy, greasy, heavy foods at first. Also, you may feel nauseous or like you need to vomit if you take any type of medication on an empty stomach.  Call your physician if you are unable to eat or drink and have persistent vomiting.    Signs of Bleeding   You could have some blood in your urine off and on over the next several weeks. Your urine will be light pink to yellow.    Treatment/wound care:   It is okay to shower 24 hours after time of surgery.    Signs of Infection  Signs of infection can include fever, chills, burning sensation with passing of urine, or severe abdominal pain.  If you see any of these occur, please contact your doctor's office at [phone].  Any fever higher than 100.4, especially if associated with an ill feeling, abdominal pain, chills, or nausea should be reported to your surgeon.      Assist in bowel movements/urination  Increase fiber in diet  Increase water (6 to 8 glasses)  Increase walking   Urination should occur within 6 hours of anesthesia  If you have tried these methods and your bladder still feels full and you cannot use the bathroom, please go to your nearest Emergency room/contact your physician.    Additional Instructions:   Pieces of your kidney stone may pass in the urine for a few days and may cause some mild pain. You also had a stent placed today from your kidney down into your bladder. This helps keep the urinary tract open and prevents blockages of urine flow. The stent can be irritating and can cause some frequency, urgency and blood in your urine when you go to the bathroom. It is important to drink  plenty of water and take medications as prescribed. You may be sent home with Pyridium which turns your urine bright orange. Applying a heating pad to your back will also help with this kind of pain. It will be determined at your follow up appointment when the stent will be removed.

## 2024-03-25 NOTE — OP NOTE
Ureteral Lithotripsy Laser W/ HOLMIUM LASER (L) Operative Note     Date: 3/25/2024  OR Location: [facility]    Name: Joce Galeana : 1940, Age: 83 y.o., MRN: [id number], Sex: male    Diagnosis  Pre-op Diagnosis     * Kidney stone [N20.0] Post-op Diagnosis     * Kidney stone [N20.0]     Procedures  1.  Cystoscopy and left ureteral stent removal  2.  Left retrograde pyelogram   3.  Left ureteroscopy and holmium laser lithotripsy  4.  Left ureteroscopic stone basket extraction  5.  Left double-J ureteral stent replacement    Surgeons      * Lamberto Matthew - Primary    Resident/Fellow/Other Assistant:  Surgeon(s) and Role:     * Joel Dunham MD - Resident - Assisting    Procedure Summary  Anesthesia: General  ASA: ASA status not filed in the log.  Anesthesia Staff: Anesthesiologist: Mohan Strickland DO  C-AA: MANUELA Hartman  Estimated Blood Loss: 1mL  Intra-op Medications:   Administrations occurring from 1435 to 1605 on 24:   Medication Name Total Dose   ceFAZolin in dextrose (iso-os) (Ancef) IVPB 2 g 2 g              Anesthesia Record               Intraprocedure I/O Totals          Intake    LR infusion 1100.00 mL    ceFAZolin in dextrose (iso-os) (Ancef) IVPB 2 g 100.00 mL    Total Intake 1200 mL       Output    Est. Blood Loss 5 mL    Total Output 5 mL       Net    Net Volume 1195 mL          Specimen:   ID Type Source Tests Collected by Time   A : LEFT KIDNEY STONE Calculus Ureter, Left CALCULI (STONE) ANALYSIS Lamberto Matthew MD 3/25/2024 1607        Staff:   Circulator: Vazquez Guaman RN  Scrub Person: Shana Sanders         Drains and/or Catheters: * None in log *    Tourniquet Times: N/a        Implants:  Implants       Type Name Action Serial No.      Stent STENT, URETERAL PERCUFLEX 6 X [id number] - [id number] Implanted               Findings: very large left proximal ureteral calculi, and 2 other intrarenal stones partially treated.    Indications: Joce Galeana is an 83 y.o. male who is  having surgery for Kidney stone [N20.0].     The patient was seen in the preoperative area. The risks, benefits, complications, treatment options, non-operative alternatives, expected recovery and outcomes were discussed with the patient. The possibilities of reaction to medication, pulmonary aspiration, injury to surrounding structures, bleeding, recurrent infection, the need for additional procedures, failure to diagnose a condition, and creating a complication requiring transfusion or operation were discussed with the patient. The patient concurred with the proposed plan, giving informed consent.  The site of surgery was properly noted/marked if necessary per policy. The patient has been actively warmed in preoperative area. Preoperative antibiotics have been ordered and given within 1 hours of incision. Venous thrombosis prophylaxis have been ordered including bilateral sequential compression devices    Procedure Details:   Patient consented to procedure in preoperative area. Risks and benefits discussed. Patient was marked on the left side. Allergies were reviewed and preoperative antibiotics were administered. Patient was brought to the operating room and placed in supine position on the operating room table. A timeout was performed. All were in agreement. Patient underwent general anesthesia without complication. They were repositioned in dorsal lithotomy and prepped and draped in the usual sterile fashion. A 21 fr rigid cystoscope was used to perform cystourethroscopy. Urethra was  normal. The Uos were difficulty to identify due to bullous edema. No masses or stones were identified.  The previously placed left ureteral stent was grasped with flexible graspers and brought to the urethral meatus. We attempted to cannulate the stent with a wire but could not advance the wire due to encrustation. The stent was then removed intact. Through the left UO, a sensor wire was placed through a dual lumen catheter to the  level of the kidney as confirmed on fluoroscopy. The catheter was then removed. Retrograde pyelogram was performed.     Retrograde pyelogram interpretation: Ureter had normal caliber and course. Filling defect were noted in the renal pelvis corresponding to the previously noted stones.   A second wire was advanced to the kidney through the dual lumen catheter. Then the dual lumen was removed. One wire was secured as a safety wire. A ureteral access sheath was advanced over the second wire under fluoro. Wire and inner lumen were removed. Pan pyeloscopy was performed.  The previously noted 21 mm stone was noted immediately inside the left renal pelvis.  A 200 micron holmium laser fiber was used to fragment all stones. A ureteral basket was then used to remove remaining stone fragments.  Due to the very large size of the stone and the subsequent debris created by lithotripsy visualization was significantly reduced and there were remaining stone fragments.  The decision was made to hold further lithotripsy and return at a future date for a second look.  Ureteral access sheath was withdrawn under direct visualization.  A 6x 26 JJ stent was placed over the safety wire with proximal curl noted in kidney on fluoro and distal curl in the bladder on direct visualization. Bladder was drained, instruments removed. This concluded the procedure. Patient was awoken from anesthesia without complication and transferred to PACU in stable condition.    Complications:  None; patient tolerated the procedure well.    Disposition: PACU - hemodynamically stable.  Condition: stable         Additional Details: Will undergo staged ureteroscopy and laser lithotripsy in approximately 2 weeks.  Will discharge with 3 days of antibiotics.    Attending Attestation:     Lamberto Matthew  Phone Number: [phone]

## 2024-03-25 NOTE — ANESTHESIA POSTPROCEDURE EVALUATION
Patient: Joce Galeana    Procedure Summary       Date: 03/25/24 Room / Location: [facility] OR 01 / [facility] OR    Anesthesia Start: 1435 Anesthesia Stop:     Procedure: Ureteral Lithotripsy Laser W/ HOLMIUM LASER (Left) Diagnosis:       Kidney stone      (Kidney stone [N20.0])    Surgeons: Lamberto Matthew MD Responsible Provider: Mohan Strickland DO    Anesthesia Type: general ASA Status: 3            Anesthesia Type: general    Vitals Value Taken Time   /71 03/25/24 1621   Temp 36 °C (96.8 °F) 03/25/24 1620   Pulse 89 03/25/24 1620   Resp 20 03/25/24 1620   SpO2 98 03/25/24 1621       Anesthesia Post Evaluation    Patient location during evaluation: PACU  Patient participation: complete - patient participated  Level of consciousness: awake and alert  Pain management: satisfactory to patient  Multimodal analgesia pain management approach  Airway patency: patent  Two or more strategies used to mitigate risk of obstructive sleep apnea  Cardiovascular status: acceptable and hemodynamically stable  Respiratory status: acceptable, face mask, nonlabored ventilation and spontaneous ventilation  Hydration status: euvolemic  Postoperative Nausea and Vomiting: none        No notable events documented.

## 2024-03-25 NOTE — ANESTHESIA PROCEDURE NOTES
Airway  Date/Time: 3/25/2024 2:43 PM  Urgency: elective    Airway not difficult    Staffing  Performed: MANUELA   Authorized by: Gin Merchant MD    Performed by: MANUELA Hartman  Patient location during procedure: OR    Indications and Patient Condition  Indications for airway management: anesthesia  Spontaneous Ventilation: absent  Sedation level: deep  Preoxygenated: yes  Patient position: sniffing  MILS not maintained throughout  Mask difficulty assessment: 1 - vent by mask    Final Airway Details  Final airway type: supraglottic airway      Successful airway: unique  Size 4     Number of attempts at approach: 1  Number of other approaches attempted: 0    Additional Comments  Atraumatic placement x1 attempt

## 2024-03-26 ENCOUNTER — PREP FOR PROCEDURE (OUTPATIENT)
Dept: UROLOGY | Facility: HOSPITAL | Age: 84
End: 2024-03-26
Payer: MEDICARE

## 2024-03-26 DIAGNOSIS — N20.0 KIDNEY STONE: Primary | ICD-10-CM

## 2024-03-26 RX ORDER — CEFAZOLIN SODIUM 2 G/100ML
2 INJECTION, SOLUTION INTRAVENOUS ONCE
Status: CANCELLED | OUTPATIENT
Start: 2024-04-08 | End: 2024-03-26

## 2024-03-26 RX ORDER — SODIUM CHLORIDE 9 MG/ML
100 INJECTION, SOLUTION INTRAVENOUS CONTINUOUS
Status: CANCELLED | OUTPATIENT
Start: 2024-04-08

## 2024-03-27 ENCOUNTER — LAB (OUTPATIENT)
Dept: LAB | Facility: LAB | Age: 84
End: 2024-03-27
Payer: MEDICARE

## 2024-03-27 DIAGNOSIS — N20.0 KIDNEY STONE: ICD-10-CM

## 2024-03-27 PROCEDURE — 87086 URINE CULTURE/COLONY COUNT: CPT

## 2024-03-28 ENCOUNTER — APPOINTMENT (OUTPATIENT)
Dept: PRIMARY CARE | Facility: CLINIC | Age: 84
End: 2024-03-28
Payer: MEDICARE

## 2024-03-28 LAB — BACTERIA UR CULT: NO GROWTH

## 2024-03-29 LAB
APPEARANCE STONE: NORMAL
COMPN STONE: NORMAL
SPECIMEN WT: 114 MG

## 2024-04-03 ENCOUNTER — ANESTHESIA EVENT (OUTPATIENT)
Dept: OPERATING ROOM | Facility: HOSPITAL | Age: 84
End: 2024-04-03
Payer: MEDICARE

## 2024-04-04 ENCOUNTER — APPOINTMENT (OUTPATIENT)
Dept: UROLOGY | Facility: CLINIC | Age: 84
End: 2024-04-04
Payer: MEDICARE

## 2024-04-06 LAB
Q ONSET: 216 MS
QRS COUNT: 16 BEATS
QRS DURATION: 82 MS
QT INTERVAL: 362 MS
QTC CALCULATION(BAZETT): 457 MS
QTC FREDERICIA: 423 MS
R AXIS: -34 DEGREES
T AXIS: -21 DEGREES
T OFFSET: 397 MS
VENTRICULAR RATE: 96 BPM

## 2024-04-08 ENCOUNTER — APPOINTMENT (OUTPATIENT)
Dept: RADIOLOGY | Facility: HOSPITAL | Age: 84
End: 2024-04-08
Payer: MEDICARE

## 2024-04-08 ENCOUNTER — HOSPITAL ENCOUNTER (OUTPATIENT)
Facility: HOSPITAL | Age: 84
Setting detail: OUTPATIENT SURGERY
Discharge: HOME | End: 2024-04-08
Attending: UROLOGY | Admitting: UROLOGY
Payer: MEDICARE

## 2024-04-08 ENCOUNTER — ANESTHESIA (OUTPATIENT)
Dept: OPERATING ROOM | Facility: HOSPITAL | Age: 84
End: 2024-04-08
Payer: MEDICARE

## 2024-04-08 VITALS
BODY MASS INDEX: 30.56 KG/M2 | HEIGHT: 64 IN | WEIGHT: 179 LBS | HEART RATE: 74 BPM | OXYGEN SATURATION: 99 % | DIASTOLIC BLOOD PRESSURE: 68 MMHG | SYSTOLIC BLOOD PRESSURE: 146 MMHG | TEMPERATURE: 96.8 F | RESPIRATION RATE: 19 BRPM

## 2024-04-08 DIAGNOSIS — N20.0 NEPHROLITHIASIS: Primary | ICD-10-CM

## 2024-04-08 DIAGNOSIS — N20.0 KIDNEY STONE: ICD-10-CM

## 2024-04-08 PROCEDURE — A52356 PR CYSTO/URETERO W/LITHOTRIPSY  AND INDWELL STENT INSRT: Performed by: ANESTHESIOLOGIST ASSISTANT

## 2024-04-08 PROCEDURE — 7100000002 HC RECOVERY ROOM TIME - EACH INCREMENTAL 1 MINUTE: Performed by: UROLOGY

## 2024-04-08 PROCEDURE — 2780000003 HC OR 278 NO HCPCS: Performed by: UROLOGY

## 2024-04-08 PROCEDURE — C1758 CATHETER, URETERAL: HCPCS | Performed by: UROLOGY

## 2024-04-08 PROCEDURE — 7100000010 HC PHASE TWO TIME - EACH INCREMENTAL 1 MINUTE: Performed by: UROLOGY

## 2024-04-08 PROCEDURE — 7100000001 HC RECOVERY ROOM TIME - INITIAL BASE CHARGE: Performed by: UROLOGY

## 2024-04-08 PROCEDURE — A52356 PR CYSTO/URETERO W/LITHOTRIPSY  AND INDWELL STENT INSRT: Performed by: ANESTHESIOLOGY

## 2024-04-08 PROCEDURE — 3700000002 HC GENERAL ANESTHESIA TIME - EACH INCREMENTAL 1 MINUTE: Performed by: UROLOGY

## 2024-04-08 PROCEDURE — 3700000001 HC GENERAL ANESTHESIA TIME - INITIAL BASE CHARGE: Performed by: UROLOGY

## 2024-04-08 PROCEDURE — 2500000004 HC RX 250 GENERAL PHARMACY W/ HCPCS (ALT 636 FOR OP/ED): Performed by: ANESTHESIOLOGIST ASSISTANT

## 2024-04-08 PROCEDURE — C1894 INTRO/SHEATH, NON-LASER: HCPCS | Performed by: UROLOGY

## 2024-04-08 PROCEDURE — 2720000007 HC OR 272 NO HCPCS: Performed by: UROLOGY

## 2024-04-08 PROCEDURE — 3600000009 HC OR TIME - EACH INCREMENTAL 1 MINUTE - PROCEDURE LEVEL FOUR: Performed by: UROLOGY

## 2024-04-08 PROCEDURE — 74420 UROGRAPHY RTRGR +-KUB: CPT | Performed by: UROLOGY

## 2024-04-08 PROCEDURE — 99100 ANES PT EXTEME AGE<1 YR&>70: CPT | Performed by: ANESTHESIOLOGY

## 2024-04-08 PROCEDURE — 82365 CALCULUS SPECTROSCOPY: CPT | Performed by: UROLOGY

## 2024-04-08 PROCEDURE — C1769 GUIDE WIRE: HCPCS | Performed by: UROLOGY

## 2024-04-08 PROCEDURE — C2617 STENT, NON-COR, TEM W/O DEL: HCPCS | Performed by: UROLOGY

## 2024-04-08 PROCEDURE — 3600000004 HC OR TIME - INITIAL BASE CHARGE - PROCEDURE LEVEL FOUR: Performed by: UROLOGY

## 2024-04-08 PROCEDURE — 52356 CYSTO/URETERO W/LITHOTRIPSY: CPT | Performed by: UROLOGY

## 2024-04-08 PROCEDURE — 7100000009 HC PHASE TWO TIME - INITIAL BASE CHARGE: Performed by: UROLOGY

## 2024-04-08 DEVICE — URETERAL STENT
Type: IMPLANTABLE DEVICE | Site: URETER | Status: FUNCTIONAL
Brand: PERCUFLEX™ PLUS

## 2024-04-08 RX ORDER — POLYETHYLENE GLYCOL 3350 17 G/17G
17 POWDER, FOR SOLUTION ORAL DAILY
Qty: 5 PACKET | Refills: 0 | Status: SHIPPED | OUTPATIENT
Start: 2024-04-08 | End: 2024-04-13

## 2024-04-08 RX ORDER — CIPROFLOXACIN 2 MG/ML
INJECTION, SOLUTION INTRAVENOUS AS NEEDED
Status: DISCONTINUED | OUTPATIENT
Start: 2024-04-08 | End: 2024-04-08

## 2024-04-08 RX ORDER — OXYCODONE HYDROCHLORIDE 10 MG/1
10 TABLET ORAL EVERY 4 HOURS PRN
Status: DISCONTINUED | OUTPATIENT
Start: 2024-04-08 | End: 2024-04-08 | Stop reason: HOSPADM

## 2024-04-08 RX ORDER — HYDROCODONE BITARTRATE AND ACETAMINOPHEN 5; 325 MG/1; MG/1
1 TABLET ORAL EVERY 4 HOURS PRN
Status: DISCONTINUED | OUTPATIENT
Start: 2024-04-08 | End: 2024-04-08 | Stop reason: HOSPADM

## 2024-04-08 RX ORDER — CIPROFLOXACIN 500 MG/1
500 TABLET ORAL 2 TIMES DAILY
Qty: 6 TABLET | Refills: 0 | Status: SHIPPED | OUTPATIENT
Start: 2024-04-08 | End: 2024-04-11

## 2024-04-08 RX ORDER — ACETAMINOPHEN 325 MG/1
975 TABLET ORAL ONCE
Status: DISCONTINUED | OUTPATIENT
Start: 2024-04-08 | End: 2024-04-08 | Stop reason: HOSPADM

## 2024-04-08 RX ORDER — CEFAZOLIN SODIUM 2 G/100ML
2 INJECTION, SOLUTION INTRAVENOUS ONCE
Status: DISCONTINUED | OUTPATIENT
Start: 2024-04-08 | End: 2024-04-08 | Stop reason: HOSPADM

## 2024-04-08 RX ORDER — SODIUM CHLORIDE, SODIUM LACTATE, POTASSIUM CHLORIDE, CALCIUM CHLORIDE 600; 310; 30; 20 MG/100ML; MG/100ML; MG/100ML; MG/100ML
INJECTION, SOLUTION INTRAVENOUS CONTINUOUS PRN
Status: DISCONTINUED | OUTPATIENT
Start: 2024-04-08 | End: 2024-04-08

## 2024-04-08 RX ORDER — ALBUTEROL SULFATE 0.83 MG/ML
2.5 SOLUTION RESPIRATORY (INHALATION) ONCE AS NEEDED
Status: DISCONTINUED | OUTPATIENT
Start: 2024-04-08 | End: 2024-04-08 | Stop reason: HOSPADM

## 2024-04-08 RX ORDER — ACETAMINOPHEN 325 MG/1
650 TABLET ORAL EVERY 4 HOURS PRN
Status: DISCONTINUED | OUTPATIENT
Start: 2024-04-08 | End: 2024-04-08 | Stop reason: HOSPADM

## 2024-04-08 RX ORDER — DEXAMETHASONE SODIUM PHOSPHATE 10 MG/ML
INJECTION INTRAMUSCULAR; INTRAVENOUS AS NEEDED
Status: DISCONTINUED | OUTPATIENT
Start: 2024-04-08 | End: 2024-04-08

## 2024-04-08 RX ORDER — PHENAZOPYRIDINE HYDROCHLORIDE 200 MG/1
200 TABLET, FILM COATED ORAL 3 TIMES DAILY
Qty: 9 TABLET | Refills: 0 | Status: SHIPPED | OUTPATIENT
Start: 2024-04-08 | End: 2024-04-11

## 2024-04-08 RX ORDER — FENTANYL CITRATE 50 UG/ML
INJECTION, SOLUTION INTRAMUSCULAR; INTRAVENOUS AS NEEDED
Status: DISCONTINUED | OUTPATIENT
Start: 2024-04-08 | End: 2024-04-08

## 2024-04-08 RX ORDER — PHENYLEPHRINE HCL IN 0.9% NACL 1 MG/10 ML
SYRINGE (ML) INTRAVENOUS AS NEEDED
Status: DISCONTINUED | OUTPATIENT
Start: 2024-04-08 | End: 2024-04-08

## 2024-04-08 RX ORDER — PROPOFOL 10 MG/ML
INJECTION, EMULSION INTRAVENOUS AS NEEDED
Status: DISCONTINUED | OUTPATIENT
Start: 2024-04-08 | End: 2024-04-08

## 2024-04-08 RX ORDER — TRAMADOL HYDROCHLORIDE 50 MG/1
50 TABLET ORAL EVERY 8 HOURS PRN
Qty: 8 TABLET | Refills: 0 | Status: SHIPPED | OUTPATIENT
Start: 2024-04-08 | End: 2024-04-11

## 2024-04-08 RX ORDER — ONDANSETRON HYDROCHLORIDE 2 MG/ML
4 INJECTION, SOLUTION INTRAVENOUS ONCE AS NEEDED
Status: DISCONTINUED | OUTPATIENT
Start: 2024-04-08 | End: 2024-04-08 | Stop reason: HOSPADM

## 2024-04-08 RX ORDER — PHENAZOPYRIDINE HYDROCHLORIDE 100 MG/1
200 TABLET, FILM COATED ORAL ONCE AS NEEDED
Status: DISCONTINUED | OUTPATIENT
Start: 2024-04-08 | End: 2024-04-08 | Stop reason: HOSPADM

## 2024-04-08 RX ORDER — MIDAZOLAM HYDROCHLORIDE 1 MG/ML
1 INJECTION, SOLUTION INTRAMUSCULAR; INTRAVENOUS ONCE AS NEEDED
Status: DISCONTINUED | OUTPATIENT
Start: 2024-04-08 | End: 2024-04-08 | Stop reason: HOSPADM

## 2024-04-08 RX ORDER — HYDRALAZINE HYDROCHLORIDE 20 MG/ML
5 INJECTION INTRAMUSCULAR; INTRAVENOUS EVERY 30 MIN PRN
Status: DISCONTINUED | OUTPATIENT
Start: 2024-04-08 | End: 2024-04-08 | Stop reason: HOSPADM

## 2024-04-08 RX ORDER — SODIUM CHLORIDE 9 MG/ML
100 INJECTION, SOLUTION INTRAVENOUS CONTINUOUS
Status: DISCONTINUED | OUTPATIENT
Start: 2024-04-08 | End: 2024-04-08 | Stop reason: HOSPADM

## 2024-04-08 RX ORDER — ONDANSETRON HYDROCHLORIDE 2 MG/ML
INJECTION, SOLUTION INTRAVENOUS AS NEEDED
Status: DISCONTINUED | OUTPATIENT
Start: 2024-04-08 | End: 2024-04-08

## 2024-04-08 RX ORDER — SODIUM CHLORIDE, SODIUM LACTATE, POTASSIUM CHLORIDE, CALCIUM CHLORIDE 600; 310; 30; 20 MG/100ML; MG/100ML; MG/100ML; MG/100ML
100 INJECTION, SOLUTION INTRAVENOUS CONTINUOUS
Status: DISCONTINUED | OUTPATIENT
Start: 2024-04-08 | End: 2024-04-08 | Stop reason: HOSPADM

## 2024-04-08 RX ADMIN — PROPOFOL 20 MG: 10 INJECTION, EMULSION INTRAVENOUS at 08:15

## 2024-04-08 RX ADMIN — SODIUM CHLORIDE, POTASSIUM CHLORIDE, SODIUM LACTATE AND CALCIUM CHLORIDE: 600; 310; 30; 20 INJECTION, SOLUTION INTRAVENOUS at 07:30

## 2024-04-08 RX ADMIN — DEXAMETHASONE SODIUM PHOSPHATE 4 MG: 10 INJECTION INTRAMUSCULAR; INTRAVENOUS at 07:42

## 2024-04-08 RX ADMIN — PROPOFOL 30 MG: 10 INJECTION, EMULSION INTRAVENOUS at 08:00

## 2024-04-08 RX ADMIN — FENTANYL CITRATE 25 MCG: 50 INJECTION, SOLUTION INTRAMUSCULAR; INTRAVENOUS at 07:36

## 2024-04-08 RX ADMIN — FENTANYL CITRATE 50 MCG: 50 INJECTION, SOLUTION INTRAMUSCULAR; INTRAVENOUS at 07:59

## 2024-04-08 RX ADMIN — Medication 100 MCG: at 07:59

## 2024-04-08 RX ADMIN — Medication 200 MCG: at 07:42

## 2024-04-08 RX ADMIN — CIPROFLOXACIN 400 MG: 400 INJECTION, SOLUTION INTRAVENOUS at 07:44

## 2024-04-08 RX ADMIN — FENTANYL CITRATE 25 MCG: 50 INJECTION, SOLUTION INTRAMUSCULAR; INTRAVENOUS at 08:09

## 2024-04-08 RX ADMIN — ONDANSETRON 4 MG: 2 INJECTION, SOLUTION INTRAMUSCULAR; INTRAVENOUS at 08:36

## 2024-04-08 RX ADMIN — PROPOFOL 150 MG: 10 INJECTION, EMULSION INTRAVENOUS at 07:36

## 2024-04-08 SDOH — HEALTH STABILITY: MENTAL HEALTH: CURRENT SMOKER: 0

## 2024-04-08 ASSESSMENT — PAIN SCALES - GENERAL
PAINLEVEL_OUTOF10: 0 - NO PAIN
PAINLEVEL_OUTOF10: 0 - NO PAIN
PAINLEVEL_OUTOF10: 1
PAINLEVEL_OUTOF10: 0 - NO PAIN

## 2024-04-08 ASSESSMENT — PAIN - FUNCTIONAL ASSESSMENT
PAIN_FUNCTIONAL_ASSESSMENT: 0-10

## 2024-04-08 ASSESSMENT — COLUMBIA-SUICIDE SEVERITY RATING SCALE - C-SSRS
1. IN THE PAST MONTH, HAVE YOU WISHED YOU WERE DEAD OR WISHED YOU COULD GO TO SLEEP AND NOT WAKE UP?: NO
6. HAVE YOU EVER DONE ANYTHING, STARTED TO DO ANYTHING, OR PREPARED TO DO ANYTHING TO END YOUR LIFE?: NO
2. HAVE YOU ACTUALLY HAD ANY THOUGHTS OF KILLING YOURSELF?: NO

## 2024-04-08 NOTE — OP NOTE
Date: 2024  OR Location: [facility] OR    Name: Joce Galeana, : 1940, Age: 83 y.o., MRN: [id number], Sex: male    Diagnosis  Pre-op Diagnosis     * Kidney stone [N20.0] Post-op Diagnosis     * Kidney stone [N20.0]     Procedures  1.  Cystoscopy and left ureteral stent removal  2.  Left retrograde pyelogram  3.  Left ureteroscopy and holmium laser lithotripsy  4.  Left ureteroscopic stone basket extraction  5.  Left ureteral stent exchange    Surgeons      * Lamberto Matthew - Primary    Resident/Fellow/Other Assistant:  Surgeon(s) and Role:    Procedure Summary  Anesthesia: General  ASA: III  Anesthesia Staff:   Anesthesiologist: Carmela Joiner MD  C-AA: MANUELA Smallwood  Estimated Blood Loss: Minimal  Intra-op Medications:   Medication Name Total Dose   sodium chloride 0.9 % irrigation solution 3,000 mL   ceFAZolin in dextrose (iso-os) (Ancef) IVPB 2 g 2 g              Anesthesia Record               Intraprocedure I/O Totals       None           Specimen:   Order Name Source Comment Collection Info Order Time   CALCULI (STONE) ANALYSIS Ureter, Left Pre-op diagnosis:  Kidney stone [N20.0] Collected By: Lamberto Matthew MD 2024  8:42 AM     Release result to MyChart   Immediate            Staff:   Circulator: Mohamud Lucero RN  Scrub Person: Kandi Irwin         Drains and/or Catheters: * None in log *    Tourniquet Times:         Implants:     Findings: 8 mm fragment at left UPJ; stone fragments in upper and lower pole calyx completely fragmented with larger fragments extracted    Indications: Joce Galeana is an 83 y.o. male who is having surgery for Kidney stone [N20.0].  83-year-old gentleman previously presented with left renal colic.  Was found to have a moderate-sized obstructing stone at the UPJ.  He underwent ureteroscopy and laser lithotripsy.  Now presents for second look ureteroscopy and further stone clearance.  The risk, benefits, and alternatives were discussed with the  patient.  Informed consent was obtained.    Procedure Details: Patient is brought to the operating room and placed in the supine position. General anesthesia is induced. Once he is adequately anesthetized, his legs are placed in the dorsal lithotomy position. His genitalia prepped and draped in the usual sterile fashion.    A 22 Spanish cystoscope was passed atraumatically per the urethra and the bladder was inspected.  No tumors or clots were identified.  The previously placed left ureteral stent was grasped with a flexible grasper and brought to the urethral meatus.  It was exchanged under fluoroscopy for a 0.35 sensor wire and the sensor wire was advanced into the renal pelvis.  This was left in place and the cystoscope was removed.  An 8/10 Spanish dual-lumen catheter was then advanced over the wire and into the distal ureter.  A second wire was deployed into the renal pelvis under fluoroscopy.  The dual-lumen catheter was removed.  1 wire was secured to the drape as a safety wire.  We advanced a 13 Spanish by 36 cm access sheath over the working wire into the mid ureter.  The wire was removed.  We then passed a 9 Spanish flexible ureteroscope atraumatically per the sheath.  We identified a stone in the left UPJ.  It was fragmented into a numerous minuscule fragments using a 200 µm holmium laser fiber.  Once this was fully treated, we advanced into the renal pelvis.  We then sequentially inspected the calyces.  In the lower pole, there were multiple fragments.  These were further fragmented using the 200 µm holmium laser fiber and then basket extracted.  All remaining fragments were less than the size of the fiber.  We then inspected the remaining calyces.  In the upper pole, we identified a second moderate-sized fragment.  This was also fragmented using the laser fiber and extracted.  Again, all fragments remaining were minuscule.  We then backed the ureteroscope and sheath out of the ureter.  No significant stone  burden was noted within the ureter.      After removing the ureteroscope and access sheath, the safety wire was backloaded through the cystoscope and the cystoscope was reinserted.  A 6 Nicaraguan by 26 cm double-J stent was advanced over the wire and into proper position.  Once in proper position, the wire was removed.  Spot fluoroscopy revealed good position of the stent.  The bladder was drained and the cystoscope was removed.  The patient was awakened and taken to the PACU in stable condition.      Complications:  None; patient tolerated the procedure well.    Disposition: PACU - hemodynamically stable.  Condition: stable       Lamberto Matthew  Phone Number: [phone]

## 2024-04-08 NOTE — ANESTHESIA PREPROCEDURE EVALUATION
Patient: Joce Galeana    Procedure Information       Date/Time: 04/08/24 0730    Procedure: Ureteral Lithotripsy Laser w/HOLMIUM LASER (Left)    Location: [facility] OR 08 / [facility]    Surgeons: Lamberto Matthew MD            Relevant Problems   Cardiac   (+) Atrial fibrillation (CMS/HCC)   (+) Benign essential hypertension   (+) Hyperlipidemia      Neuro   (+) Right sciatic nerve pain      GI   (+) Bleeding internal hemorrhoids      /Renal   (+) Acute UTI   (+) BPH without urinary obstruction   (+) Benign prostatic hyperplasia with lower urinary tract symptoms   (+) Kidney stone   (+) Nephrolithiasis      Endocrine   (+) Obesity      Hematology   (+) Anemia      Musculoskeletal   (+) Rheumatoid arthritis (CMS/HCC)      HEENT   (+) Acute sinusitis      ID   (+) Acute UTI   (+) Upper respiratory infection      Skin   (+) Basal cell carcinoma       Clinical information reviewed:   Tobacco  Allergies  Meds   Med Hx  Surg Hx   Fam Hx  Soc Hx        NPO Detail:  NPO/Void Status  Date of Last Liquid: 04/08/24  Time of Last Liquid: 0500  Date of Last Solid: 04/07/24  Time of Last Solid: 1630  Last Intake Type: Clear fluids  Time of Last Void: 0600         Physical Exam    Airway  Mallampati: II  TM distance: >3 FB  Neck ROM: full     Cardiovascular - normal exam  Rhythm: regular  Rate: normal     Dental - normal exam     Pulmonary - normal exam  Breath sounds clear to auscultation     Abdominal   (+) obese  Abdomen: soft  Bowel sounds: normal           Anesthesia Plan    History of general anesthesia?: yes  History of complications of general anesthesia?: no    ASA 3     general     The patient is not a current smoker.  Patient was previously instructed to abstain from smoking on day of procedure.  Patient did not smoke on day of procedure.  Education provided regarding risk of obstructive sleep apnea.  intravenous induction   Postoperative administration of opioids is intended.  Anesthetic plan and risks  discussed with patient.  Use of blood products discussed with patient who.    Plan discussed with CAA.

## 2024-04-08 NOTE — ANESTHESIA PROCEDURE NOTES
Airway  Date/Time: 4/8/2024 7:41 AM  Urgency: elective    Airway not difficult    Staffing  Performed: MANUELA   Authorized by: Carmela Joiner MD    Performed by: MANUELA Smallwood  Patient location during procedure: OR    Indications and Patient Condition  Indications for airway management: anesthesia  Spontaneous Ventilation: absent  Sedation level: deep  Preoxygenated: yes  Patient position: sniffing  MILS maintained throughout  Mask difficulty assessment: 1 - vent by mask    Final Airway Details  Final airway type: supraglottic airway      Successful airway: classic  Size 4  Airway Seal Pressure (cm H2O): 5     Number of attempts at approach: 1  Number of other approaches attempted: 0

## 2024-04-08 NOTE — DISCHARGE INSTRUCTIONS
[facility] [place]    DISCHARGE INSTRUCTIONS     Cystoscopy  Retrograde pyelogram  Ureteroscopy   Laser Lithotripsy  Stent placement    Joce Galeana    Call [phone] during regular daytime business hours (8:00 am - 5:00 pm) and after 5:00 pm ask for the Urology resident with any questions or concerns.    If it is a life-threatening situation, proceed to the nearest emergency department.        Follow-up appointment:  4/16/2024  for stent removal    Thank you for the opportunity to care for you today.  Your health and healing are very important to us.  We hope we made you feel as comfortable as possible and are committed to your recovery and continued well-being.      The following is a brief overview of your Ureteroscopy Laser Lithotripsy procedure today. Some of the information contained on this summary may be confidential.  This information should be kept in your records and should be shared with your regular doctor.    Physicians:   Dr. Matthew    Procedure performed: Lasering of your left kidney stone      What to Expect During your Recovery and Home Care  Anesthesia Side Effects   You received anesthesia today.  You may feel sleepy, tired, or have a sore throat.   You may also feel drowsiness, dizziness, or inability to think clearly.  For your safety, do not drive, drink alcoholic beverages, take any unprescribed medication or make any important decisions for 24 hours.  A responsible adult should be with you for 24 hours.        Activity and Recovery    No heavy lifting today. Rest for the next 24 hours.    Pain Control  Unfortunately, you may experience pain after your procedure.  Frequency and urgency to urinate and mild discomfort are expected. Adequate pain management can include alternative measures to help ease your pain and that can include over the counter Tylenol/ibuprofen and a heating pad or tramadol which can be taken as prescribed as needed for breakthrough pain. Do not take more than  4,000mg of Tylenol in a 24-hour period.      You may have also been prescribed Pyridium (for burning sensation) and Ditropan(for bladder spasms) for pain control. Pyridium will turn your urine bright orange.    Nausea/Vomiting   Clear liquids are best tolerated at first. Start slow, advance your diet as tolerated to normal foods. Avoid spicy, greasy, heavy foods at first. Also, you may feel nauseous or like you need to vomit if you take any type of medication on an empty stomach.  Call your physician if you are unable to eat or drink and have persistent vomiting.    Signs of Bleeding   You could have some blood in your urine off and on over the next several weeks. Your urine will be light pink to yellow.    Treatment/wound care:   It is okay to shower 24 hours after time of surgery.    Signs of Infection  Signs of infection can include fever, chills, burning sensation with passing of urine, or severe abdominal pain.  If you see any of these occur, please contact your doctor's office at [phone].  Any fever higher than 100.4, especially if associated with an ill feeling, abdominal pain, chills, or nausea should be reported to your surgeon.      Assist in bowel movements/urination  Increase fiber in diet  Increase water (6 to 8 glasses)  Increase walking   Urination should occur within 6 hours of anesthesia  If you have tried these methods and your bladder still feels full and you cannot use the bathroom, please go to your nearest Emergency room/contact your physician.    Additional Instructions:   Pieces of your kidney stone may pass in the urine for a few days and may cause some mild pain. You also had a stent placed today from your kidney down into your bladder. This helps keep the urinary tract open and prevents blockages of urine flow. The stent can be irritating and can cause some frequency, urgency and blood in your urine when you go to the bathroom. It is important to drink plenty of water and take  medications as prescribed. You may be sent home with Pyridium which turns your urine bright orange. Applying a heating pad to your back will also help with this kind of pain. It will be determined at your follow up appointment when the stent will be removed.

## 2024-04-08 NOTE — ANESTHESIA POSTPROCEDURE EVALUATION
Patient: Joce Galeana    Procedure Summary       Date: 04/08/24 Room / Location: [facility] OR 01 / [facility]    Anesthesia Start: 0731 Anesthesia Stop: 0851    Procedure: Ureteral Lithotripsy Laser w/HOLMIUM LASER (Left) Diagnosis:       Kidney stone      (Kidney stone [N20.0])    Surgeons: Lamberto Matthew MD Responsible Provider: Carmela Joiner MD    Anesthesia Type: general ASA Status: 3            Anesthesia Type: general    Vitals Value Taken Time   /88 04/08/24 0848   Temp 36 04/08/24 0851   Pulse 80 04/08/24 0851   Resp 12 04/08/24 0851   SpO2 95 % 04/08/24 0849   Vitals shown include unvalidated device data.    Anesthesia Post Evaluation    Patient location during evaluation: PACU  Patient participation: complete - patient participated  Level of consciousness: awake and alert  Pain management: satisfactory to patient  Airway patency: patent  Cardiovascular status: acceptable  Respiratory status: acceptable, nonlabored ventilation and spontaneous ventilation  Hydration status: acceptable  Postoperative Nausea and Vomiting: none        No notable events documented.

## 2024-04-11 ENCOUNTER — TELEPHONE (OUTPATIENT)
Dept: PRIMARY CARE | Facility: CLINIC | Age: 84
End: 2024-04-11
Payer: MEDICARE

## 2024-04-11 NOTE — TELEPHONE ENCOUNTER
Patients daughter faxed fmla form last week regarding her father's care  as he is unable to drive and (daughter's employer asking for a form from patient's pcp.) please advise ?  [phone] Charissa

## 2024-04-12 ENCOUNTER — APPOINTMENT (OUTPATIENT)
Dept: PRIMARY CARE | Facility: CLINIC | Age: 84
End: 2024-04-12
Payer: MEDICARE

## 2024-04-13 LAB
APPEARANCE STONE: NORMAL
COMPN STONE: NORMAL
SPECIMEN WT: 132 MG

## 2024-04-16 ENCOUNTER — PROCEDURE VISIT (OUTPATIENT)
Dept: UROLOGY | Facility: CLINIC | Age: 84
End: 2024-04-16
Payer: MEDICARE

## 2024-04-16 VITALS
DIASTOLIC BLOOD PRESSURE: 72 MMHG | SYSTOLIC BLOOD PRESSURE: 115 MMHG | TEMPERATURE: 97.5 F | WEIGHT: 179 LBS | HEART RATE: 89 BPM | BODY MASS INDEX: 30.73 KG/M2

## 2024-04-16 DIAGNOSIS — N20.0 KIDNEY STONE: Primary | ICD-10-CM

## 2024-04-16 LAB
POC APPEARANCE, URINE: CLEAR
POC BILIRUBIN, URINE: NEGATIVE
POC BLOOD, URINE: ABNORMAL
POC COLOR, URINE: YELLOW
POC GLUCOSE, URINE: NEGATIVE MG/DL
POC KETONES, URINE: NEGATIVE MG/DL
POC LEUKOCYTES, URINE: ABNORMAL
POC NITRITE,URINE: NEGATIVE
POC PH, URINE: 6 PH
POC PROTEIN, URINE: ABNORMAL MG/DL
POC SPECIFIC GRAVITY, URINE: >=1.03
POC UROBILINOGEN, URINE: 0.2 EU/DL

## 2024-04-16 PROCEDURE — 81003 URINALYSIS AUTO W/O SCOPE: CPT | Performed by: UROLOGY

## 2024-04-16 PROCEDURE — 52310 CYSTOSCOPY AND TREATMENT: CPT | Performed by: UROLOGY

## 2024-04-16 RX ORDER — FENTANYL CITRATE 50 UG/ML
50 INJECTION, SOLUTION INTRAMUSCULAR; INTRAVENOUS
COMMUNITY
Start: 2024-03-25 | End: 2024-05-24 | Stop reason: WASHOUT

## 2024-04-16 RX ORDER — ALBUTEROL SULFATE 0.83 MG/ML
2.5 SOLUTION RESPIRATORY (INHALATION)
COMMUNITY
Start: 2024-03-25

## 2024-04-16 RX ORDER — HYDRALAZINE HYDROCHLORIDE 20 MG/ML
5 INJECTION INTRAMUSCULAR; INTRAVENOUS
COMMUNITY
Start: 2024-03-25

## 2024-04-16 RX ORDER — LABETALOL HYDROCHLORIDE 5 MG/ML
5 INJECTION, SOLUTION INTRAVENOUS
COMMUNITY
Start: 2024-03-25 | End: 2024-05-24 | Stop reason: WASHOUT

## 2024-04-16 RX ORDER — SODIUM CHLORIDE 9 MG/ML
100 INJECTION, SOLUTION INTRAVENOUS
COMMUNITY
Start: 2024-03-25

## 2024-04-16 NOTE — PROGRESS NOTES
Subjective   Patient ID: Joce Galeana is a 83 y.o. male who presents for Nephrolithiasis. Patient is s/p left ureteroscopy, laser lithotripsy, stone extraction, and left stent replacement on 4/8/24.     HPI      Stone Analysis  Component  Ref Range & Units 8 d ago 3 wk ago   Calculi Mass  mg 132 114   Calculi Description See Note See Note CM   Comment: Specimen consists of numerous brown and tan calculi fragments.  The total weight is 132 mg.   Calculi Composition See Note See Note CM   Comment: Calculi composed primarily of uric acid.  INTERPRETIVE INFORMATION: Calculi (Stone) analysis    Calculi are the products of physiological processes that yield  crystalline compounds in a matrix of biological compounds and  blood.  Matrix components are not reported.  The clinically  significant crystalline components identified in calculi specimens  are reported.  Gross description may not be consistent with  composition determined by FTIR analysis.     Review of Systems  A 12 system review was completed and is negative with the exception of those signs and symptoms noted in the history of present illness.    Objective   Physical Exam  General: in NAD, appears stated age  Head: normocephalic, atraumatic  Respiratory: normal effort, no use of accessory muscles  Cardiovascular: no edema noted  Skin: normal turgor, no rashes  Neurologic: grossly intact, oriented to person/place/time  Psychiatric: mode and affect appropriate     Procedure  Cystoscopy for left stent removal  Patient's genitalia were prepped and draped in the usual sterile fashion. A 15 Venezuelan flexible cystoscope was passed atraumatically per the urethra. We entered the bladder and identified the previously placed ureteral stent emanating from the left ureteral orifice. It was grasped with a flexible grasper and removed intact. Patient tolerated the procedure without difficulty.     Assessment/Plan   Problem List Items Addressed This Visit             ICD-10-CM     Kidney stone - Primary N20.0    Relevant Orders    POCT UA Automated manually resulted (Completed)    Cystourethroscopy (Completed)    US renal complete     Patient asked about his right side, which has a small stone that should pass spontaneously if it moves. Patient can return to normal exercise and activity tomorrow. Order Renal US for 1 months and follow-up with results.    Follow-up in 1 month for continued management of kidney stones.     Scribe Attestation  By signing my name below, I, Ct Thomas   attest that this documentation has been prepared under the direction and in the presence of Lamberto Matthew MD.

## 2024-05-16 ENCOUNTER — HOSPITAL ENCOUNTER (OUTPATIENT)
Dept: RADIOLOGY | Facility: CLINIC | Age: 84
Discharge: HOME | End: 2024-05-16
Payer: MEDICARE

## 2024-05-16 DIAGNOSIS — N20.0 KIDNEY STONE: ICD-10-CM

## 2024-05-16 PROCEDURE — 76770 US EXAM ABDO BACK WALL COMP: CPT | Performed by: RADIOLOGY

## 2024-05-16 PROCEDURE — 76770 US EXAM ABDO BACK WALL COMP: CPT

## 2024-05-24 ENCOUNTER — LAB (OUTPATIENT)
Dept: LAB | Facility: LAB | Age: 84
End: 2024-05-24
Payer: MEDICARE

## 2024-05-24 ENCOUNTER — OFFICE VISIT (OUTPATIENT)
Dept: PRIMARY CARE | Facility: CLINIC | Age: 84
End: 2024-05-24
Payer: MEDICARE

## 2024-05-24 VITALS
OXYGEN SATURATION: 96 % | HEART RATE: 80 BPM | SYSTOLIC BLOOD PRESSURE: 138 MMHG | TEMPERATURE: 97.3 F | WEIGHT: 183.8 LBS | RESPIRATION RATE: 16 BRPM | BODY MASS INDEX: 32.57 KG/M2 | DIASTOLIC BLOOD PRESSURE: 93 MMHG | HEIGHT: 63 IN

## 2024-05-24 DIAGNOSIS — M05.79 RHEUMATOID ARTHRITIS INVOLVING MULTIPLE SITES WITH POSITIVE RHEUMATOID FACTOR (MULTI): ICD-10-CM

## 2024-05-24 DIAGNOSIS — R53.83 OTHER FATIGUE: ICD-10-CM

## 2024-05-24 DIAGNOSIS — Z00.00 ROUTINE GENERAL MEDICAL EXAMINATION AT HEALTH CARE FACILITY: Primary | ICD-10-CM

## 2024-05-24 DIAGNOSIS — E78.2 MIXED HYPERLIPIDEMIA: ICD-10-CM

## 2024-05-24 DIAGNOSIS — N20.0 NEPHROLITHIASIS: ICD-10-CM

## 2024-05-24 DIAGNOSIS — I48.11 LONGSTANDING PERSISTENT ATRIAL FIBRILLATION (MULTI): ICD-10-CM

## 2024-05-24 DIAGNOSIS — I10 BENIGN ESSENTIAL HYPERTENSION: ICD-10-CM

## 2024-05-24 DIAGNOSIS — N18.31 CKD STAGE G3A/A1, GFR 45-59 AND ALBUMIN CREATININE RATIO <30 MG/G (MULTI): ICD-10-CM

## 2024-05-24 LAB
ANION GAP SERPL CALC-SCNC: 14 MMOL/L (ref 10–20)
BUN SERPL-MCNC: 30 MG/DL (ref 6–23)
CALCIUM SERPL-MCNC: 9 MG/DL (ref 8.6–10.6)
CHLORIDE SERPL-SCNC: 105 MMOL/L (ref 98–107)
CO2 SERPL-SCNC: 26 MMOL/L (ref 21–32)
CREAT SERPL-MCNC: 1.29 MG/DL (ref 0.5–1.3)
EGFRCR SERPLBLD CKD-EPI 2021: 55 ML/MIN/1.73M*2
ERYTHROCYTE [DISTWIDTH] IN BLOOD BY AUTOMATED COUNT: 15.3 % (ref 11.5–14.5)
GLUCOSE SERPL-MCNC: 97 MG/DL (ref 74–99)
HCT VFR BLD AUTO: 39.6 % (ref 41–52)
HGB BLD-MCNC: 12.9 G/DL (ref 13.5–17.5)
MCH RBC QN AUTO: 33.2 PG (ref 26–34)
MCHC RBC AUTO-ENTMCNC: 32.6 G/DL (ref 32–36)
MCV RBC AUTO: 102 FL (ref 80–100)
NRBC BLD-RTO: 0 /100 WBCS (ref 0–0)
PLATELET # BLD AUTO: 148 X10*3/UL (ref 150–450)
POTASSIUM SERPL-SCNC: 4.3 MMOL/L (ref 3.5–5.3)
RBC # BLD AUTO: 3.88 X10*6/UL (ref 4.5–5.9)
SODIUM SERPL-SCNC: 141 MMOL/L (ref 136–145)
TSH SERPL-ACNC: 0.53 MIU/L (ref 0.44–3.98)
WBC # BLD AUTO: 7.1 X10*3/UL (ref 4.4–11.3)

## 2024-05-24 PROCEDURE — 85027 COMPLETE CBC AUTOMATED: CPT

## 2024-05-24 PROCEDURE — 1160F RVW MEDS BY RX/DR IN RCRD: CPT | Performed by: INTERNAL MEDICINE

## 2024-05-24 PROCEDURE — 84443 ASSAY THYROID STIM HORMONE: CPT

## 2024-05-24 PROCEDURE — 99214 OFFICE O/P EST MOD 30 MIN: CPT | Performed by: INTERNAL MEDICINE

## 2024-05-24 PROCEDURE — 80048 BASIC METABOLIC PNL TOTAL CA: CPT

## 2024-05-24 PROCEDURE — G0439 PPPS, SUBSEQ VISIT: HCPCS | Performed by: INTERNAL MEDICINE

## 2024-05-24 PROCEDURE — 1036F TOBACCO NON-USER: CPT | Performed by: INTERNAL MEDICINE

## 2024-05-24 PROCEDURE — 36415 COLL VENOUS BLD VENIPUNCTURE: CPT

## 2024-05-24 PROCEDURE — 1170F FXNL STATUS ASSESSED: CPT | Performed by: INTERNAL MEDICINE

## 2024-05-24 PROCEDURE — 1157F ADVNC CARE PLAN IN RCRD: CPT | Performed by: INTERNAL MEDICINE

## 2024-05-24 PROCEDURE — 3080F DIAST BP >= 90 MM HG: CPT | Performed by: INTERNAL MEDICINE

## 2024-05-24 PROCEDURE — 1159F MED LIST DOCD IN RCRD: CPT | Performed by: INTERNAL MEDICINE

## 2024-05-24 PROCEDURE — 1123F ACP DISCUSS/DSCN MKR DOCD: CPT | Performed by: INTERNAL MEDICINE

## 2024-05-24 PROCEDURE — 1158F ADVNC CARE PLAN TLK DOCD: CPT | Performed by: INTERNAL MEDICINE

## 2024-05-24 PROCEDURE — 3075F SYST BP GE 130 - 139MM HG: CPT | Performed by: INTERNAL MEDICINE

## 2024-05-24 ASSESSMENT — ENCOUNTER SYMPTOMS
SINUS PAIN: 0
JOINT SWELLING: 0
POLYPHAGIA: 0
MYALGIAS: 0
DIFFICULTY URINATING: 0
DIZZINESS: 0
PSYCHIATRIC NEGATIVE: 1
HEMATURIA: 1
DIARRHEA: 0
WHEEZING: 0
HEMATOLOGIC/LYMPHATIC NEGATIVE: 1
WOUND: 0
EYE REDNESS: 0
FLANK PAIN: 0
FREQUENCY: 0
ENDOCRINE NEGATIVE: 1
SPEECH DIFFICULTY: 0
SORE THROAT: 0
TREMORS: 0
ABDOMINAL PAIN: 0
NERVOUS/ANXIOUS: 0
RESPIRATORY NEGATIVE: 1
NECK PAIN: 0
EYE DISCHARGE: 0
AGITATION: 0
SLEEP DISTURBANCE: 0
CARDIOVASCULAR NEGATIVE: 1
CHEST TIGHTNESS: 0
LIGHT-HEADEDNESS: 0
UNEXPECTED WEIGHT CHANGE: 0
EYE PAIN: 0
ARTHRALGIAS: 1
BACK PAIN: 1
VOICE CHANGE: 0
NAUSEA: 0
ACTIVITY CHANGE: 0
VOMITING: 0
CONFUSION: 0
ADENOPATHY: 0
GASTROINTESTINAL NEGATIVE: 1
DYSURIA: 0
HALLUCINATIONS: 0
ALLERGIC/IMMUNOLOGIC NEGATIVE: 1
EYES NEGATIVE: 1
HEADACHES: 0
COLOR CHANGE: 0
BRUISES/BLEEDS EASILY: 0
BLOOD IN STOOL: 0
TROUBLE SWALLOWING: 0
CONSTIPATION: 0
SEIZURES: 0
PALPITATIONS: 0
SHORTNESS OF BREATH: 0
NUMBNESS: 0
COUGH: 0
WEAKNESS: 0
FACIAL ASYMMETRY: 0
SINUS PRESSURE: 0
NECK STIFFNESS: 0
APPETITE CHANGE: 0
POLYDIPSIA: 0
STRIDOR: 0
FATIGUE: 1

## 2024-05-24 ASSESSMENT — ACTIVITIES OF DAILY LIVING (ADL)
MANAGING_FINANCES: INDEPENDENT
BATHING: INDEPENDENT
TAKING_MEDICATION: INDEPENDENT
GROCERY_SHOPPING: INDEPENDENT
DOING_HOUSEWORK: INDEPENDENT
DRESSING: INDEPENDENT

## 2024-05-24 ASSESSMENT — PATIENT HEALTH QUESTIONNAIRE - PHQ9
SUM OF ALL RESPONSES TO PHQ9 QUESTIONS 1 AND 2: 0
1. LITTLE INTEREST OR PLEASURE IN DOING THINGS: NOT AT ALL
2. FEELING DOWN, DEPRESSED OR HOPELESS: NOT AT ALL

## 2024-05-24 NOTE — PROGRESS NOTES
Subjective   Reason for Visit: Joce Galeana is an 83 y.o. male here for a Medicare Wellness visit.     Past Medical, Surgical, and Family History reviewed and updated in chart.    Reviewed all medications by prescribing practitioner or clinical pharmacist (such as prescriptions, OTCs, herbal therapies and supplements) and documented in the medical record.    HPI    Patient Care Team:  Zainab Henning MD PhD as PCP - General  Zainab Henning MD PhD as PCP - [facility] ACO Attributed Provider     Patient comes for Medicare Wellness and Follow up visit.     This is 82 yo patient with h/o RA, A. Fib, HTN, HLD, nephrolithiasis.     Patient has been feeling pretty good although tired and has been complaint with prescribed medications.    We reviewed blood work including CBC, CMP, TSH, Lipid Panel, HbA1c, Vit D level done in 2023 and 2024.  Results within acceptable range.      Pneumonia Vacc: 2023  Advance Directives: Healthcare  POA not on file. Patient advised to complete forms and bring/mail to the office.   POA discussed, confirmed and documented in patient's  EPIC record.     He was admitted to [facility] 3 times in last 4 months due to hematuria and nephrolithiasis.  He has been following with Dr. Matthew, underwent lithotripsy and ureteroscopic stone basket extraction.    His recent US of kidneys done on 5/16 showed no hydronephrosis. Bilateral renal cysts were noted and 7 mm left kidney nephrolith.    He follows with  rheumatologist Dr. Riley, diagnosed with RA and PMR, advised prednisone in addition to Methotrexate.  He takes Tramadol as needed for pain control, prescribed by rheumatologist, no signs or symptoms of tramadol misuse or overuse.          Follows regularly with cardiologist Dr. Ibarra.  Patient was diagnosed with A. fib. in June 2017, saw his cardiologist Dr. Ibarra, underwent cardioversion which was successful. Patient was started on Eliquis, Metoprolol 75 mg bid in addition to his medications.  Amlodipine was discontinued.      Remains  active around the house, and has been exercising few times per week at his home gym.    Review of Systems   Constitutional:  Positive for fatigue. Negative for activity change, appetite change and unexpected weight change.   HENT: Negative.  Negative for congestion, ear discharge, ear pain, hearing loss, mouth sores, nosebleeds, sinus pressure, sinus pain, sore throat, trouble swallowing and voice change.    Eyes: Negative.  Negative for pain, discharge, redness and visual disturbance.   Respiratory: Negative.  Negative for cough, chest tightness, shortness of breath, wheezing and stridor.    Cardiovascular: Negative.  Negative for chest pain, palpitations and leg swelling.   Gastrointestinal: Negative.  Negative for abdominal pain, blood in stool, constipation, diarrhea, nausea and vomiting.   Endocrine: Negative.  Negative for polydipsia, polyphagia and polyuria.   Genitourinary:  Positive for hematuria. Negative for difficulty urinating, dysuria, flank pain, frequency and urgency.   Musculoskeletal:  Positive for arthralgias, back pain and gait problem. Negative for joint swelling, myalgias, neck pain and neck stiffness.   Skin: Negative.  Negative for color change, rash and wound.   Allergic/Immunologic: Negative.  Negative for environmental allergies, food allergies and immunocompromised state.   Neurological:  Negative for dizziness, tremors, seizures, syncope, facial asymmetry, speech difficulty, weakness, light-headedness, numbness and headaches.   Hematological: Negative.  Negative for adenopathy. Does not bruise/bleed easily.   Psychiatric/Behavioral: Negative.  Negative for agitation, behavioral problems, confusion, hallucinations, sleep disturbance and suicidal ideas. The patient is not nervous/anxious.    All other systems reviewed and are negative.      Objective   Vitals:  BP (!) 138/93 (BP Location: Left arm, Patient Position: Sitting, BP Cuff Size: Adult)   " Pulse 80   Temp 36.3 °C (97.3 °F)   Resp 16   Ht 1.6 m (5' 3\")   Wt 83.4 kg (183 lb 12.8 oz)   SpO2 96%   BMI 32.56 kg/m²       Physical Exam  Vitals and nursing note reviewed.   Constitutional:       General: He is not in acute distress.     Appearance: Normal appearance.   HENT:      Head: Normocephalic and atraumatic.      Right Ear: External ear normal.      Left Ear: External ear normal.      Nose: Nose normal. No congestion or rhinorrhea.   Eyes:      General:         Right eye: No discharge.         Left eye: No discharge.      Extraocular Movements: Extraocular movements intact.      Conjunctiva/sclera: Conjunctivae normal.      Pupils: Pupils are equal, round, and reactive to light.   Cardiovascular:      Rate and Rhythm: Normal rate and regular rhythm.      Pulses: Normal pulses.      Heart sounds: Normal heart sounds. No murmur heard.     No friction rub. No gallop.   Pulmonary:      Effort: Pulmonary effort is normal. No respiratory distress.      Breath sounds: Normal breath sounds. No stridor. No wheezing, rhonchi or rales.   Chest:      Chest wall: No tenderness.   Abdominal:      General: Bowel sounds are normal.      Palpations: Abdomen is soft. There is no mass.      Tenderness: There is no abdominal tenderness. There is no guarding or rebound.   Musculoskeletal:         General: No swelling or deformity. Normal range of motion.      Cervical back: Normal range of motion and neck supple. No rigidity or tenderness.      Right lower leg: No edema.      Left lower leg: No edema.   Lymphadenopathy:      Cervical: No cervical adenopathy.   Skin:     General: Skin is warm and dry.      Coloration: Skin is not jaundiced.      Findings: No bruising or erythema.   Neurological:      General: No focal deficit present.      Mental Status: He is alert and oriented to person, place, and time. Mental status is at baseline.      Cranial Nerves: No cranial nerve deficit.      Motor: No weakness.      " Coordination: Coordination normal.      Gait: Gait normal.   Psychiatric:         Mood and Affect: Mood normal.         Behavior: Behavior normal.         Thought Content: Thought content normal.         Judgment: Judgment normal.         Assessment/Plan   Problem List Items Addressed This Visit          Cardiac and Vasculature    Atrial fibrillation (Multi)    Current Assessment & Plan     Clinically stable. F/up with cardiology. Remains on Eliquis.         Benign essential hypertension    Current Assessment & Plan     Controlled. Continue current treatment.          Hyperlipidemia    Current Assessment & Plan     Continue statin.             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 Will monitor.         Nephrolithiasis    Current Assessment & Plan     Clinically stable. F/up with urology Dr. Matthew.            Health Encounters    Routine general medical examination at health care facility - Primary       Multi-system (Lupus, Sarcoid)    Rheumatoid arthritis (Multi)    Current Assessment & Plan     Clinically stable. F/up with rheumatology.            Symptoms and Signs    Fatigue    Relevant Orders    CBC    TSH with reflex to Free T4 if abnormal    Basic metabolic panel     It was a pleasure to see you today.  I would like to remind you about importance of a healthy lifestyle in order to improve your well-being and live longer.  Try to engage in physical activities for at least 150 minutes per week.  Eat about 10 servings of fruits and vegetables daily. My advice is 2 servings of fruits and 8 servings of vegetables.  For vegetables choose at least half of them green and at least half of them fresh.  Please avoid sugar, salt, fried food and saturated fat.    I spent a total of 34 minutes on the date of service for follow up visit, which included preparing to see the patient, face-to-face  patient care, completing clinical documentation, obtaining and/or reviewing separately obtained history, performing a medically appropriate examination, counseling and educating the patient/family/caregiver, ordering medications, tests, or procedures, communicating with other health care providers (not separately reported), independently interpreting results (not separately reported), communicating results to the patient/family/caregiver, and care coordination (not separately reported).    F/up in 6 months or sooner if needed.

## 2024-05-26 NOTE — RESULT ENCOUNTER NOTE
Your recent lab work was acceptable.   Mild anemia noted. Please increase iron in your diet.  All results may not be within normal range but they are not clinically significant at this time and do not require change in your therapy. We will discuss details during your next office visit. Please keep your next appointment as scheduled. Dr. Milligan

## 2024-05-30 ENCOUNTER — OFFICE VISIT (OUTPATIENT)
Dept: UROLOGY | Facility: CLINIC | Age: 84
End: 2024-05-30
Payer: MEDICARE

## 2024-05-30 VITALS — SYSTOLIC BLOOD PRESSURE: 123 MMHG | HEART RATE: 90 BPM | DIASTOLIC BLOOD PRESSURE: 76 MMHG

## 2024-05-30 DIAGNOSIS — N20.0 KIDNEY STONE: ICD-10-CM

## 2024-05-30 PROCEDURE — 99213 OFFICE O/P EST LOW 20 MIN: CPT | Performed by: UROLOGY

## 2024-05-30 PROCEDURE — 1160F RVW MEDS BY RX/DR IN RCRD: CPT | Performed by: UROLOGY

## 2024-05-30 PROCEDURE — 3078F DIAST BP <80 MM HG: CPT | Performed by: UROLOGY

## 2024-05-30 PROCEDURE — 3074F SYST BP LT 130 MM HG: CPT | Performed by: UROLOGY

## 2024-05-30 PROCEDURE — 1123F ACP DISCUSS/DSCN MKR DOCD: CPT | Performed by: UROLOGY

## 2024-05-30 PROCEDURE — 1157F ADVNC CARE PLAN IN RCRD: CPT | Performed by: UROLOGY

## 2024-05-30 PROCEDURE — 1159F MED LIST DOCD IN RCRD: CPT | Performed by: UROLOGY

## 2024-07-15 ENCOUNTER — TELEPHONE (OUTPATIENT)
Dept: PRIMARY CARE | Facility: CLINIC | Age: 84
End: 2024-07-15
Payer: MEDICARE

## 2024-07-15 NOTE — TELEPHONE ENCOUNTER
Spoke with pt , pt stated it was a training mistake by [facility] , pt will receive his medication delivered in a week , no need for a new rx

## 2024-07-15 NOTE — TELEPHONE ENCOUNTER
pt called in today stating that [facility] no longer has atorvastatin (Lipitor) 20 mg , pt requesting a different medication to be prescribed and sent to [facility]

## 2024-08-05 ENCOUNTER — APPOINTMENT (OUTPATIENT)
Dept: DERMATOLOGY | Facility: CLINIC | Age: 84
End: 2024-08-05
Payer: MEDICARE

## 2024-08-05 DIAGNOSIS — L81.4 LENTIGO: ICD-10-CM

## 2024-08-05 DIAGNOSIS — L57.0 ACTINIC KERATOSES: ICD-10-CM

## 2024-08-05 DIAGNOSIS — D48.5 NEOPLASM OF UNCERTAIN BEHAVIOR OF SKIN: Primary | ICD-10-CM

## 2024-08-05 DIAGNOSIS — D18.01 HEMANGIOMA OF SKIN: ICD-10-CM

## 2024-08-05 DIAGNOSIS — L82.1 SEBORRHEIC KERATOSIS: ICD-10-CM

## 2024-08-05 DIAGNOSIS — Z85.828 PERSONAL HISTORY OF SKIN CANCER: ICD-10-CM

## 2024-08-05 PROCEDURE — 11103 TANGNTL BX SKIN EA SEP/ADDL: CPT | Performed by: STUDENT IN AN ORGANIZED HEALTH CARE EDUCATION/TRAINING PROGRAM

## 2024-08-05 PROCEDURE — 99203 OFFICE O/P NEW LOW 30 MIN: CPT | Performed by: STUDENT IN AN ORGANIZED HEALTH CARE EDUCATION/TRAINING PROGRAM

## 2024-08-05 PROCEDURE — 1123F ACP DISCUSS/DSCN MKR DOCD: CPT | Performed by: STUDENT IN AN ORGANIZED HEALTH CARE EDUCATION/TRAINING PROGRAM

## 2024-08-05 PROCEDURE — 11102 TANGNTL BX SKIN SINGLE LES: CPT | Performed by: STUDENT IN AN ORGANIZED HEALTH CARE EDUCATION/TRAINING PROGRAM

## 2024-08-05 PROCEDURE — 17004 DESTROY PREMAL LESIONS 15/>: CPT | Performed by: STUDENT IN AN ORGANIZED HEALTH CARE EDUCATION/TRAINING PROGRAM

## 2024-08-05 PROCEDURE — 1159F MED LIST DOCD IN RCRD: CPT | Performed by: STUDENT IN AN ORGANIZED HEALTH CARE EDUCATION/TRAINING PROGRAM

## 2024-08-05 PROCEDURE — 1157F ADVNC CARE PLAN IN RCRD: CPT | Performed by: STUDENT IN AN ORGANIZED HEALTH CARE EDUCATION/TRAINING PROGRAM

## 2024-08-05 NOTE — PROGRESS NOTES
Subjective     Joce Galeana is a 83 y.o. male who presents for the following: Suspicious Skin Lesion (To left and right neck, present for several months, itchy at times. One lesion to right chest, present for 50 years, would like it checked as well. /Hx of BCC to nose about 10yrs ago, Mohs surgery. On methotrexate and prednisone for RA for the past 15 years. ).     Review of Systems:  No other skin or systemic complaints other than what is documented elsewhere in the note.    The following portions of the chart were reviewed this encounter and updated as appropriate:         Skin Cancer History  Hx Bcc on nose  Hx unknown skin cancer left temple  >10 years ago      Specialty Problems          Dermatology Problems    Basal cell carcinoma        Objective   Well appearing patient in no apparent distress; mood and affect are within normal limits.    A full examination was performed including scalp, head, eyes, ears, nose, lips, neck, chest, axillae, abdomen, back, buttocks, bilateral upper extremities, bilateral lower extremities, hands, feet, fingers, toes, fingernails, and toenails. All findings within normal limits unless otherwise noted below.    Assessment/Plan   1. Neoplasm of uncertain behavior of skin (3)  Left Lower Back  15 mm cutaneous horn           Lesion biopsy  Type of biopsy: tangential    Informed consent: discussed and consent obtained    Timeout: patient name, date of birth, surgical site, and procedure verified    Procedure prep:  Patient was prepped and draped  Anesthesia: the lesion was anesthetized in a standard fashion    Anesthetic:  1% lidocaine w/ epinephrine 1-100,000 local infiltration  Instrument used: DermaBlade    Hemostasis achieved with: aluminum chloride    Outcome: patient tolerated procedure well    Post-procedure details: sterile dressing applied and wound care instructions given    Dressing type: petrolatum and bandage      Staff Communication: Dermatology Local Anesthesia: 1 %  Lidocaine / Epinephrine - Amount: 2 ml    Specimen 1 - Dermatopathology- DERM LAB  Differential Diagnosis: cutaneous horn r/o scc  Check Margins Yes/No?:    Comments:    Dermpath Lab: Routine Histopathology (formalin-fixed tissue)    Right Neck - Posterior  9 mm eroded pink papule          Lesion biopsy  Type of biopsy: tangential    Informed consent: discussed and consent obtained    Timeout: patient name, date of birth, surgical site, and procedure verified    Procedure prep:  Patient was prepped and draped  Anesthesia: the lesion was anesthetized in a standard fashion    Anesthetic:  1% lidocaine w/ epinephrine 1-100,000 local infiltration  Instrument used: DermaBlade    Hemostasis achieved with: aluminum chloride    Outcome: patient tolerated procedure well    Post-procedure details: sterile dressing applied and wound care instructions given    Dressing type: petrolatum and bandage      Specimen 2 - Dermatopathology- DERM LAB  Differential Diagnosis: nmsc vs trauma  Check Margins Yes/No?:    Comments:    Dermpath Lab: Routine Histopathology (formalin-fixed tissue)    Left Anterior Neck  8 mm eroded papule          Lesion biopsy  Type of biopsy: tangential    Informed consent: discussed and consent obtained    Timeout: patient name, date of birth, surgical site, and procedure verified    Procedure prep:  Patient was prepped and draped  Anesthesia: the lesion was anesthetized in a standard fashion    Anesthetic:  1% lidocaine w/ epinephrine 1-100,000 local infiltration  Instrument used: DermaBlade    Hemostasis achieved with: aluminum chloride    Outcome: patient tolerated procedure well    Post-procedure details: sterile dressing applied and wound care instructions given    Dressing type: petrolatum and bandage      Specimen 3 - Dermatopathology- DERM LAB  Differential Diagnosis: r/o nmsc  Check Margins Yes/No?:    Comments:    Dermpath Lab: Routine Histopathology (formalin-fixed tissue)    Concerning lesion  found on exam. DDX for lesion includes: r/o nmsc x3. The need for biopsy to aid in diagnosis was discussed and recommended. Risks and benefits of biopsy were reviewed. See procedure note.    2. Actinic keratoses (17)  Left Anterior Mandible, Left Ear, Left Forehead (2), Left Parotid Area (2), Left Temple (2), Left Zygomatic Area, Mid Forehead, Right Anterior Neck, Right Ear, Right Forehead, Right Parotid Area, Right Temple (2), Right Zygomatic Area  Erythematous gritty macule(s)    Lesions are due to chronic, cumulative sun damage over time and are pre-cancerous. They have a risk of developing into squamous cell carcinoma and therefore treatment recommendations were offered and discussed with the patient. Discussed LN2 & topical chemotherapy creams, risks and benefits of each. The risks and benefits of LN2 were reviewed including incomplete removal, crusting, blister hypo and/or hyperpigmentation, scarring. The patient elected for LN2 today.    Destr of lesion - Left Anterior Mandible, Left Ear, Left Forehead (2), Left Parotid Area (2), Left Temple (2), Left Zygomatic Area, Mid Forehead, Right Anterior Neck, Right Ear, Right Forehead, Right Parotid Area, Right Temple (2), Right Zygomatic Area  Complexity: simple    Destruction method: cryotherapy    Informed consent: discussed and consent obtained    Lesion destroyed using liquid nitrogen: Yes    Cryotherapy cycles:  1  Outcome: patient tolerated procedure well with no complications    Post-procedure details: wound care instructions given      3. Personal history of skin cancer  Scar s at site of prior procedure    There is no evidence of recurrence on clinical examination today, reassurance was provided to the patient. Discussed that hx of skin cancer increases risk of developing future skin cancer and total body skin exams are warranted every 6 months    4. Seborrheic keratosis  Stuck on verrucous, tan-brown papules and plaques.      The benign nature of these  skin lesions were reviewed with the patient today and reassurance was provided. Patient advised to return for f/u if the lesions change in size, shape, color, become painful, tender, itch or bleed.    5. Hemangioma of skin  Bright red papules    Discussed benign nature of condition, reassured. Reviewed warning signs of skin cancer with patient.    6. Lentigo  Scattered tan macules in sun-exposed areas.    Benign nature of these skin lesions reviewed and relation to sun exposure discussed. Reassurance provided. Reviewed warning signs of skin cancer.

## 2024-08-07 LAB
LABORATORY COMMENT REPORT: NORMAL
PATH REPORT.FINAL DX SPEC: NORMAL
PATH REPORT.GROSS SPEC: NORMAL
PATH REPORT.MICROSCOPIC SPEC OTHER STN: NORMAL
PATH REPORT.RELEVANT HX SPEC: NORMAL
PATH REPORT.TOTAL CANCER: NORMAL

## 2024-08-12 ENCOUNTER — TELEPHONE (OUTPATIENT)
Dept: DERMATOLOGY | Facility: CLINIC | Age: 84
End: 2024-08-12
Payer: MEDICARE

## 2024-08-12 DIAGNOSIS — C44.42 SQUAMOUS CELL CARCINOMA OF SKIN OF SCALP AND NECK: Primary | ICD-10-CM

## 2024-08-12 NOTE — TELEPHONE ENCOUNTER
Returned patient's call regarding questions about areas near hairline which were treated at last visit. Confirmed that lesions were Actinic Keratosis and were spot treated with Liquid Nitrogen by Dr. Moreno. Also reviewed 3 recent biopsy results, locations, and treatment prescribed.

## 2024-08-12 NOTE — RESULT ENCOUNTER NOTE
Spoke with patient regarding three shave biopsy results:   1. Lower Left Back-Hyper Keratotic Actinic Keratosis. Dr. Moreno noted that this will be treated with LN at next visit  2. 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  3. Left Anterior Neck-Atypical Squamous Proliferation-requiring Mohs.  PA for Mohs submitted 8/12/24.

## 2024-10-01 ENCOUNTER — APPOINTMENT (OUTPATIENT)
Dept: DERMATOLOGY | Facility: CLINIC | Age: 84
End: 2024-10-01
Payer: MEDICARE

## 2024-10-01 VITALS — HEART RATE: 93 BPM | SYSTOLIC BLOOD PRESSURE: 123 MMHG | DIASTOLIC BLOOD PRESSURE: 78 MMHG

## 2024-10-01 DIAGNOSIS — C44.92 SQUAMOUS CELL CARCINOMA OF SKIN: ICD-10-CM

## 2024-10-01 PROCEDURE — 13132 CMPLX RPR F/C/C/M/N/AX/G/H/F: CPT | Performed by: DERMATOLOGY

## 2024-10-01 PROCEDURE — 17311 MOHS 1 STAGE H/N/HF/G: CPT | Performed by: DERMATOLOGY

## 2024-10-01 NOTE — PROGRESS NOTES
Office Visit Note  Date: 10/1/2024  Surgeon:  Robert Ferguson MD PhD  Office Location: [address] [address]  Dept: [phone]  Dept Fax: [phone]  Referring Provider: Eula Moreno MD  [address] [place] [address]  [address], [address],  [facility]    Subjective   Joce Galeana is a 84 y.o. male who presents for the following: MOHS Surgery    According to the patient, the lesion has been present for approximately 6 months at the time of diagnosis.  The lesion is painful.  The lesion has not been treated previously.    The patient does not have a pacemaker / defibrillator.  The patient does not have a heart valve / joint replacement.    The patient is on blood thinners.  The patient does not have a history of hepatitis B or C.  The patient does not have a history of HIV.  The patient does not have a history of immunosuppression (e.g. organ transplantation, malignancy, medications)    Review of Systems:  No other skin or systemic complaints other than what is documented elsewhere in the note.    MEDICAL HISTORY: clinically relevant history including significant past medical history, medications and allergies was reviewed and documented in Epic.    Objective   Well appearing patient in no apparent distress; mood and affect are within normal limits.  Vital signs: See record.  Noted on the Left Anterior Neck  Is a 0.6 x 1.0 cm scar                  The patient confirmed the identified site.    Discussion:  The nature of the diagnosis was explained. The lesion is a skin cancer.  It has a risk of local growth and distant spread. The condition is associated with sun exposure.  Warning signs of non-melanoma skin cancer discussed. Patient was instructed to perform monthly self skin examination.  We recommended that the patient have regular full skin exams given an increased risk of subsequent skin cancers. The patient was instructed to use sun protective  behaviors including use of broad spectrum sunscreens and sun protective clothing to reduce risk of skin cancers.      Risks, benefits, side effects of Mohs surgery were discussed with patient and the patient voiced understanding.  It was explained that even though the cure rate of Mohs is very high it is not 100%. Risks of surgery including but not limited to bleeding, infection, numbness, nerve damage, and scar were reviewed.  Discussion included wound care requirements, activity restrictions, likely scar outcome and time to heal.     After Mohs surgery, the defect may need to be repaired surgically and the scar may be longer than the original lesion.  Reconstruction options, risks, and benefits were reviewed including second intention healing, linear repair (4-1 ratio was explained), local flaps, skin grafts, cartilage grafts and interpolation flaps (the need for multiple surgeries was explained). Possible outcomes were reviewed including likely scar appearance, failure of flap survival, infection, bleeding and the need for revision surgery.     The pathology was reviewed, the photograph was reviewed, and the referring physician's note was reviewed.    Patient elected for Mohs surgery.

## 2024-10-01 NOTE — PROGRESS NOTES
Mohs Surgery Operative Note    Date of Surgery:  10/1/2024  Surgeon:  Robert Ferguson MD PhD  Office Location: [address] [address]  Dept: [phone]  Dept Fax: [phone]  Referring Provider: Eula Moreno MD  [address] [place] [address]  [address], [address] [address]      Assessment/Plan   Pre-procedure:   Obtained informed consent: written from patient  The surgical site was identified and confirmed with the patient.     Intra-operative:   Audible time out called at : 12:32 PM 10/01/24  by: KARLA CLAUDIO RN   Verified patient name, birthdate, site, specimen bottle label & requisition.    The planned procedure(s) was again reviewed with the patient. The risks of bleeding, infection, nerve damage and scarring were reviewed. Written authorization was obtained. The patient identity, surgical site, and planned procedure(s) were verified. The provider acted as both surgeon and pathologist.     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    Mohs surgery    Consent obtained: written    Universal Protocol:  Procedure explained and questions answered to patient or proxy's satisfaction: Yes    Test results available and properly labeled: Yes    Pathology report reviewed: Yes    External notes reviewed: Yes    Photo or diagram used for site identification: Yes    Site/side marked: Yes    Slide independently reviewed by Mohs surgeon: Yes    Immediately prior to procedure a time out was called: Yes    Patient identity confirmed: verbally with patient  Preparation: Patient was prepped and draped in usual sterile fashion      Anticoagulation:  Is the patient taking prescription anticoagulant and/or aspirin prescribed/recommended by a physician? Yes    Was the anticoagulation regimen changed prior to Mohs? No      Anesthesia:  Anesthesia method: local infiltration  Local anesthetic: lidocaine 1% WITH epi    Procedure Details:  Case ID Number:  [id number]  Biopsy accession number: [id number]  Date of biopsy: 8/5/2024  Frozen section biopsy performed: No    Specimen debulked: Yes (Squamous cell carcinoma in situ)    Pre-Op diagnosis: squamous cell carcinoma  SCC subtype: in situ  Surgical site (from skin exam): Left Anterior Neck  Pre-operative length (cm): 0.6  Pre-operative width (cm): 1  Indications for Mohs surgery: anatomic location where tissue conservation is critical    Micrographic Surgery Details:  Post-operative length (cm): 1.8  Post-operative width (cm): 1.5  Number of Mohs stages: 1    Stage 1     Comments: The patient was brought into the operating room and placed in the procedure chair in the appropriate position.  The area positive by previous biopsy was identified and confirmed with the patient. The area of clinically obvious tumor was debulked using a curette and/or scalpel as needed. An incision was made following the Mohs approach through the skin. The specimen was taken to the lab, divided into 2 piece(s) and appropriately chromacoded and processed.               Tumor features identified on Mohs section: no tumor identified    Depth of defect: subcutaneous fat    Patient tolerance of procedure: tolerated well, no immediate complications    Reconstruction:  Was the defect reconstructed? Yes    Was reconstruction performed by the same Mohs surgeon? Yes    Setting of reconstruction: outpatient office  When was reconstruction performed? same day  Type of reconstruction: linear  Linear reconstruction: complex  Length of linear repair (cm): 4  Subcutaneous Layers (Deep Stitches)   Suture size:  3-0  Suture type:  Vicryl  Stitches:  Buried vertical mattress  Fine/surface layer approximation (top stitches)   Epidermal/Superficial suture size:  5-0  Epidermal/Superficial suture type:  Fast-absorbing gut  Stitches: simple running    Hemostasis achieved with: electrodesiccation  Outcome: patient tolerated procedure well with no complications     Post-procedure details: sterile dressing applied and wound care instructions given    Dressing type: pressure dressing, Hypafix, Telfa pad and petrolatum          Complex Linear Repair - Wide Undermining:  Given the location and size of the defect, it was determined that a complex layered linear closure was required to restore normal anatomy and function. The repair was considered complex because extensive undermining was required and performed. The amount of undermining performed was greater than the maximum width of the defect as measured perpendicular to the closure line along at least one entire edge of the defect. Standing cutaneous cones were removed using Burow's triangles. The wound edges were brought into close approximation with buried vertical mattress sutures. The remainder of the wound was then closed with epidermal top sutures.              The final repair measured 4.0 cm    Wound care was discussed, and the patient was given written post-operative wound care instructions.      The patient will follow up with Robert Ferguson MD PhD as needed for any post operative problems or concerns, and will follow up with their primary dermatologist as scheduled.

## 2024-10-04 DIAGNOSIS — E78.2 MIXED HYPERLIPIDEMIA: ICD-10-CM

## 2024-10-07 RX ORDER — ATORVASTATIN CALCIUM 20 MG/1
20 TABLET, FILM COATED ORAL DAILY
Qty: 90 TABLET | Refills: 3 | Status: SHIPPED | OUTPATIENT
Start: 2024-10-07

## 2024-10-07 NOTE — TELEPHONE ENCOUNTER
11/25/2024 next visit    Include Z78.9 (Other Specified Conditions Influencing Health Status) As An Associated Diagnosis?: Yes Medical Necessity Clause: This procedure was medically necessary because the lesions that were treated were: Detail Level: Simple Spray Paint Technique: No Number Of Freeze-Thaw Cycles: 3 freeze-thaw cycles Duration Of Freeze Thaw-Cycle (Seconds): 3 Spray Paint Text: The liquid nitrogen was applied to the skin utilizing a spray paint frosting technique. Consent: The patient's consent was obtained including but not limited to risks of crusting, scabbing, blistering, scarring, darker or lighter pigmentary change, recurrence, incomplete removal and infection. Post-Care Instructions: I reviewed with the patient in detail post-care instructions. Patient is to wear sunprotection, and avoid picking at any of the treated lesions. Pt may apply Vaseline to crusted or scabbing areas. Medical Necessity Information: It is in your best interest to select a reason for this procedure from the list below. All of these items fulfill various CMS LCD requirements except the new and changing color options.

## 2024-10-08 ENCOUNTER — APPOINTMENT (OUTPATIENT)
Dept: DERMATOLOGY | Facility: CLINIC | Age: 84
End: 2024-10-08
Payer: MEDICARE

## 2024-10-08 VITALS — DIASTOLIC BLOOD PRESSURE: 79 MMHG | SYSTOLIC BLOOD PRESSURE: 125 MMHG | HEART RATE: 90 BPM

## 2024-10-08 DIAGNOSIS — C44.92 SQUAMOUS CELL CARCINOMA OF SKIN: ICD-10-CM

## 2024-10-08 PROCEDURE — 17311 MOHS 1 STAGE H/N/HF/G: CPT | Performed by: DERMATOLOGY

## 2024-10-08 PROCEDURE — 13132 CMPLX RPR F/C/C/M/N/AX/G/H/F: CPT | Performed by: DERMATOLOGY

## 2024-10-08 NOTE — PROGRESS NOTES
Mohs Surgery Operative Note    Date of Surgery:  10/8/2024  Surgeon:  Robert Ferguson MD PhD  Office Location: [address] [address]  Dept: [phone]  Dept Fax: [phone]  Referring Provider: Eula Moreno MD  [address] [place] [address]  B[address], [address] [address]      Assessment/Plan   Pre-procedure:   Obtained informed consent: written from patient  The surgical site was identified and confirmed with the patient.     Intra-operative:   Audible time out called at : 12:07 PM 10/08/24  by: Ji Masterson MA   Verified patient name, birthdate, site, specimen bottle label & requisition.    The planned procedure(s) was again reviewed with the patient. The risks of bleeding, infection, nerve damage and scarring were reviewed. Written authorization was obtained. The patient identity, surgical site, and planned procedure(s) were verified. The provider acted as both surgeon and pathologist.     Squamous cell carcinoma of skin  Right Posterior Neck    Mohs surgery    Consent obtained: written    Universal Protocol:  Procedure explained and questions answered to patient or proxy's satisfaction: Yes    Test results available and properly labeled: Yes    Pathology report reviewed: Yes    External notes reviewed: Yes    Photo or diagram used for site identification: Yes    Site/side marked: Yes    Slide independently reviewed by Mohs surgeon: Yes    Immediately prior to procedure a time out was called: Yes    Patient identity confirmed: verbally with patient  Preparation: Patient was prepped and draped in usual sterile fashion      Anticoagulation:  Is the patient taking prescription anticoagulant and/or aspirin prescribed/recommended by a physician? Yes    Was the anticoagulation regimen changed prior to Mohs? No      Anesthesia:  Anesthesia method: local infiltration  Local anesthetic: lidocaine 1% WITH epi    Procedure Details:  Case ID Number:  [id number]  Biopsy accession number: [id number]  Date of biopsy: 8/5/2024  Frozen section biopsy performed: No    Specimen debulked: Yes (scar)    Pre-Op diagnosis: squamous cell carcinoma  Surgical site (from skin exam): Right Posterior Neck  Pre-operative length (cm): 0.7  Pre-operative width (cm): 1  Indications for Mohs surgery: anatomic location where tissue conservation is critical  Previously treated? No      Micrographic Surgery Details:  Post-operative length (cm): 1.5  Post-operative width (cm): 1  Number of Mohs stages: 1    Stage 1     Comments: The patient was brought into the operating room and placed in the procedure chair in the appropriate position.  The area positive by previous biopsy was identified and confirmed with the patient. The area of clinically obvious tumor was debulked using a curette and/or scalpel as needed. An incision was made following the Mohs approach through the skin. The specimen was taken to the lab, divided into 2 piece(s) and appropriately chromacoded and processed.                 Tumor features identified on Mohs section: no tumor identified    Depth of defect: subcutaneous fat    Patient tolerance of procedure: tolerated well, no immediate complications    Reconstruction:  Was the defect reconstructed? Yes    Was reconstruction performed by the same Mohs surgeon? Yes    Setting of reconstruction: outpatient office  When was reconstruction performed? same day  Type of reconstruction: linear  Linear reconstruction: complex  Length of linear repair (cm): 4  Subcutaneous Layers (Deep Stitches)   Suture size:  3-0  Suture type:  Vicryl  Stitches:  Buried vertical mattress  Fine/surface layer approximation (top stitches)   Epidermal/Superficial suture size:  4-0  Epidermal/Superficial suture type:  Prolene  Stitches: simple running    Hemostasis achieved with: electrodesiccation  Outcome: patient tolerated procedure well with no complications    Post-procedure details: sterile  dressing applied and wound care instructions given    Dressing type: pressure dressing          Complex Linear Repair - Wide Undermining:  Given the location and size of the defect, it was determined that a complex layered linear closure was required to restore normal anatomy and function. The repair was considered complex because extensive undermining was required and performed. The amount of undermining performed was greater than the maximum width of the defect as measured perpendicular to the closure line along at least one entire edge of the defect. Standing cutaneous cones were removed using Burow's triangles. The wound edges were brought into close approximation with buried vertical mattress sutures. The remainder of the wound was then closed with epidermal top sutures.        The final repair measured 4.0 cm    Wound care was discussed, and the patient was given written post-operative wound care instructions.      The patient will follow up with Robert Ferguson MD PhD as needed for any post operative problems or concerns, and will follow up with their primary dermatologist as scheduled.

## 2024-10-08 NOTE — PROGRESS NOTES
Office Visit Note  Date: 10/8/2024  Surgeon:  Robert Ferguson MD PhD  Office Location: [address] [address]  Dept: [phone]  Dept Fax: [phone]  Referring Provider: Eula Moreno MD  [address] [place] [address]  [address], [address],  [facility]    Subjective   Joce Galeana is a 84 y.o. male who presents for the following: MOHS Surgery (Right neck posterior)    According to the patient, the lesion has been present for approximately 6 months at the time of diagnosis.  The lesion is not causing symptoms.  The lesion has not been treated previously.    The patient does not have a pacemaker / defibrillator.  The patient does not have a heart valve / joint replacement.    The patient is on blood thinners.  The patient does not have a history of hepatitis B or C.  The patient does not have a history of HIV.  The patient does not have a history of immunosuppression (e.g. organ transplantation, malignancy, medications)    Review of Systems:  No other skin or systemic complaints other than what is documented elsewhere in the note.    MEDICAL HISTORY: clinically relevant history including significant past medical history, medications and allergies was reviewed and documented in Epic.    Objective   Well appearing patient in no apparent distress; mood and affect are within normal limits.  Vital signs: See record.  Noted on the Right Posterior Neck  Is a 0.7 x 1.0 cm scar        The patient confirmed the identified site.    Discussion:  The nature of the diagnosis was explained. The lesion is a skin cancer.  It has a risk of local growth and distant spread. The condition is associated with sun exposure.  Warning signs of non-melanoma skin cancer discussed. Patient was instructed to perform monthly self skin examination.  We recommended that the patient have regular full skin exams given an increased risk of subsequent skin cancers. The patient was instructed to  use sun protective behaviors including use of broad spectrum sunscreens and sun protective clothing to reduce risk of skin cancers.      Risks, benefits, side effects of Mohs surgery were discussed with patient and the patient voiced understanding.  It was explained that even though the cure rate of Mohs is very high it is not 100%. Risks of surgery including but not limited to bleeding, infection, numbness, nerve damage, and scar were reviewed.  Discussion included wound care requirements, activity restrictions, likely scar outcome and time to heal.     After Mohs surgery, the defect may need to be repaired surgically and the scar may be longer than the original lesion.  Reconstruction options, risks, and benefits were reviewed including second intention healing, linear repair (4-1 ratio was explained), local flaps, skin grafts, cartilage grafts and interpolation flaps (the need for multiple surgeries was explained). Possible outcomes were reviewed including likely scar appearance, failure of flap survival, infection, bleeding and the need for revision surgery.     The pathology was reviewed, the photograph was reviewed, and the referring physician's note was reviewed.    Patient elected for Mohs surgery.

## 2024-10-18 ENCOUNTER — APPOINTMENT (OUTPATIENT)
Dept: DERMATOLOGY | Facility: CLINIC | Age: 84
End: 2024-10-18
Payer: MEDICARE

## 2024-10-18 NOTE — PROGRESS NOTES
Office Follow Up Note    Visit Summary  Chief Complaint    1. Complaint Wound check.    Joce Galeana is a 84 y.o. male who presents for 10 day follow up after surgery for a squamous cell carcinoma. The patient has no concerns today.     Location Operation site location: right posterior neck    On exam,  Mr. Galeana is well-appearing and in no apparent distress. The surgical site appears clean with minimal to no erythema. No tenderness and good wound edge apposition.    Assessment and Plan:  History of skin cancer requiring ongoing monitoring for recurrence and additional lesion development.   The patient was reassured that the wound is healing appropriately.   Sutures were removed without complication today.  The dressing was removed, the wound cleaned a a new dressing reapplied.     The patient was advised on the importance of routine skin monitoring including follow up with general dermatology and instructed to call with any further concerns. The patient will return as needed.

## 2024-11-25 ENCOUNTER — APPOINTMENT (OUTPATIENT)
Dept: PRIMARY CARE | Facility: CLINIC | Age: 84
End: 2024-11-25
Payer: MEDICARE

## 2024-11-25 VITALS
HEIGHT: 63 IN | TEMPERATURE: 98 F | OXYGEN SATURATION: 99 % | WEIGHT: 191.6 LBS | SYSTOLIC BLOOD PRESSURE: 130 MMHG | DIASTOLIC BLOOD PRESSURE: 80 MMHG | RESPIRATION RATE: 16 BRPM | BODY MASS INDEX: 33.95 KG/M2 | HEART RATE: 59 BPM

## 2024-11-25 DIAGNOSIS — N20.0 NEPHROLITHIASIS: ICD-10-CM

## 2024-11-25 DIAGNOSIS — R35.0 BENIGN PROSTATIC HYPERPLASIA WITH URINARY FREQUENCY: ICD-10-CM

## 2024-11-25 DIAGNOSIS — I10 BENIGN ESSENTIAL HYPERTENSION: ICD-10-CM

## 2024-11-25 DIAGNOSIS — N40.1 BENIGN PROSTATIC HYPERPLASIA WITH URINARY FREQUENCY: ICD-10-CM

## 2024-11-25 DIAGNOSIS — E78.2 MIXED HYPERLIPIDEMIA: Primary | ICD-10-CM

## 2024-11-25 DIAGNOSIS — D64.9 ANEMIA, UNSPECIFIED TYPE: ICD-10-CM

## 2024-11-25 DIAGNOSIS — M05.79 RHEUMATOID ARTHRITIS INVOLVING MULTIPLE SITES WITH POSITIVE RHEUMATOID FACTOR (MULTI): ICD-10-CM

## 2024-11-25 DIAGNOSIS — I48.11 LONGSTANDING PERSISTENT ATRIAL FIBRILLATION (MULTI): ICD-10-CM

## 2024-11-25 PROCEDURE — 1123F ACP DISCUSS/DSCN MKR DOCD: CPT | Performed by: INTERNAL MEDICINE

## 2024-11-25 PROCEDURE — 1036F TOBACCO NON-USER: CPT | Performed by: INTERNAL MEDICINE

## 2024-11-25 PROCEDURE — 3079F DIAST BP 80-89 MM HG: CPT | Performed by: INTERNAL MEDICINE

## 2024-11-25 PROCEDURE — G2211 COMPLEX E/M VISIT ADD ON: HCPCS | Performed by: INTERNAL MEDICINE

## 2024-11-25 PROCEDURE — 1160F RVW MEDS BY RX/DR IN RCRD: CPT | Performed by: INTERNAL MEDICINE

## 2024-11-25 PROCEDURE — 1157F ADVNC CARE PLAN IN RCRD: CPT | Performed by: INTERNAL MEDICINE

## 2024-11-25 PROCEDURE — 99214 OFFICE O/P EST MOD 30 MIN: CPT | Performed by: INTERNAL MEDICINE

## 2024-11-25 PROCEDURE — 1159F MED LIST DOCD IN RCRD: CPT | Performed by: INTERNAL MEDICINE

## 2024-11-25 PROCEDURE — 3075F SYST BP GE 130 - 139MM HG: CPT | Performed by: INTERNAL MEDICINE

## 2024-11-25 RX ORDER — TAMSULOSIN HYDROCHLORIDE 0.4 MG/1
0.4 CAPSULE ORAL 2 TIMES DAILY
Qty: 180 CAPSULE | Refills: 3 | Status: SHIPPED | OUTPATIENT
Start: 2024-11-25

## 2024-11-25 ASSESSMENT — ENCOUNTER SYMPTOMS
WEAKNESS: 0
HEMATOLOGIC/LYMPHATIC NEGATIVE: 1
CHEST TIGHTNESS: 0
SORE THROAT: 0
ALLERGIC/IMMUNOLOGIC NEGATIVE: 1
DIARRHEA: 0
AGITATION: 0
EYE PAIN: 0
WOUND: 0
JOINT SWELLING: 0
VOMITING: 0
RESPIRATORY NEGATIVE: 1
ARTHRALGIAS: 1
LIGHT-HEADEDNESS: 0
COLOR CHANGE: 0
SLEEP DISTURBANCE: 0
GASTROINTESTINAL NEGATIVE: 1
BLOOD IN STOOL: 0
NEUROLOGICAL NEGATIVE: 1
FACIAL ASYMMETRY: 0
SEIZURES: 0
SHORTNESS OF BREATH: 0
EYE REDNESS: 0
BACK PAIN: 1
CONSTIPATION: 0
HALLUCINATIONS: 0
SINUS PAIN: 0
ACTIVITY CHANGE: 0
DIZZINESS: 0
NECK PAIN: 0
EYES NEGATIVE: 1
UNEXPECTED WEIGHT CHANGE: 0
NECK STIFFNESS: 0
CARDIOVASCULAR NEGATIVE: 1
TREMORS: 0
NUMBNESS: 0
ABDOMINAL PAIN: 0
PSYCHIATRIC NEGATIVE: 1
CONFUSION: 0
TROUBLE SWALLOWING: 0
POLYDIPSIA: 0
VOICE CHANGE: 0
HEADACHES: 0
ADENOPATHY: 0
NAUSEA: 0
DIFFICULTY URINATING: 0
ENDOCRINE NEGATIVE: 1
SPEECH DIFFICULTY: 0
POLYPHAGIA: 0
CONSTITUTIONAL NEGATIVE: 1
FREQUENCY: 0
EYE DISCHARGE: 0
STRIDOR: 0
SINUS PRESSURE: 0
COUGH: 0
WHEEZING: 0
BRUISES/BLEEDS EASILY: 0
DYSURIA: 0
PALPITATIONS: 0
NERVOUS/ANXIOUS: 0
MYALGIAS: 0
APPETITE CHANGE: 0
FLANK PAIN: 0

## 2024-11-25 NOTE — PROGRESS NOTES
Subjective   Patient ID: Joce Galeana is a 84 y.o. male who presents for Follow-up (Pt is here due to a 6 month follow up).  HPI    This is 85 yo male with very complex medical problems including HTN, HLD, A. Fib, nephrolithiasis, RA, sciatica, OA, SCC.    We reviewed and discussed his medical conditions during today's visit.     Patient has been feeling pretty good and has been complaint with prescribed medications.    We reviewed and discussed details of recent blood work: CBC, CMP, TSH, Lipid panel, Hb A1c, Vit D, PSA.  Results within acceptable range.    Patient underwent Mohs surgery twice in October 2024 for two different skin ca (SCC) on left side of neck, and right posterior neck. Surgical wounds have healed nicely.     He has been following with urology Dr. Matthew regarding nephrolithiasis and kidneys cysts.     He was admitted to [facility] 3 times in last 4 months due to hematuria and nephrolithiasis.  He has been following with Dr. Matthew, underwent lithotripsy and ureteroscopic stone basket extraction.      He follows with  rheumatologist Dr. Riley, diagnosed with RA and PMR, advised prednisone in addition to Methotrexate.  He takes Tramadol as needed for pain control, prescribed by rheumatologist, no signs or symptoms of tramadol misuse or overuse.           Follows regularly with cardiologist Dr. Ibarra.  Patient was diagnosed with A. fib. in June 2017, saw his cardiologist Dr. Ibarra, underwent cardioversion which was successful. Patient was started on Eliquis, Metoprolol 75 mg bid in addition to his medications. Amlodipine was discontinued.       Remains  active around the house, and has been exercising few times per week at his home gym.    Review of Systems   Constitutional: Negative.  Negative for activity change, appetite change and unexpected weight change.   HENT: Negative.  Negative for congestion, ear discharge, ear pain, hearing loss, mouth sores, nosebleeds, sinus pressure, sinus pain,  "sore throat, trouble swallowing and voice change.    Eyes: Negative.  Negative for pain, discharge, redness and visual disturbance.   Respiratory: Negative.  Negative for cough, chest tightness, shortness of breath, wheezing and stridor.    Cardiovascular: Negative.  Negative for chest pain, palpitations and leg swelling.   Gastrointestinal: Negative.  Negative for abdominal pain, blood in stool, constipation, diarrhea, nausea and vomiting.   Endocrine: Negative.  Negative for polydipsia, polyphagia and polyuria.   Genitourinary: Negative.  Negative for difficulty urinating, dysuria, flank pain, frequency and urgency.   Musculoskeletal:  Positive for arthralgias and back pain. Negative for gait problem, joint swelling, myalgias, neck pain and neck stiffness.   Skin: Negative.  Negative for color change, rash and wound.   Allergic/Immunologic: Negative.  Negative for environmental allergies, food allergies and immunocompromised state.   Neurological: Negative.  Negative for dizziness, tremors, seizures, syncope, facial asymmetry, speech difficulty, weakness, light-headedness, numbness and headaches.   Hematological: Negative.  Negative for adenopathy. Does not bruise/bleed easily.   Psychiatric/Behavioral: Negative.  Negative for agitation, behavioral problems, confusion, hallucinations, sleep disturbance and suicidal ideas. The patient is not nervous/anxious.    All other systems reviewed and are negative.      Objective     Review of systems was performed and all systems were negative except what in HPI    /80 (BP Location: Left arm, Patient Position: Sitting, BP Cuff Size: Adult)   Pulse 59   Temp 36.7 °C (98 °F) (Temporal)   Resp 16   Ht 1.6 m (5' 3\")   Wt 86.9 kg (191 lb 9.6 oz)   SpO2 99%   BMI 33.94 kg/m²    Physical Exam  Vitals and nursing note reviewed.   Constitutional:       General: He is not in acute distress.     Appearance: Normal appearance.   HENT:      Head: Normocephalic and " atraumatic.      Right Ear: External ear normal.      Left Ear: External ear normal.      Nose: Nose normal. No congestion or rhinorrhea.   Eyes:      General:         Right eye: No discharge.         Left eye: No discharge.      Extraocular Movements: Extraocular movements intact.      Conjunctiva/sclera: Conjunctivae normal.      Pupils: Pupils are equal, round, and reactive to light.   Cardiovascular:      Rate and Rhythm: Normal rate and regular rhythm.      Pulses: Normal pulses.      Heart sounds: Normal heart sounds. No murmur heard.     No friction rub. No gallop.   Pulmonary:      Effort: Pulmonary effort is normal. No respiratory distress.      Breath sounds: Normal breath sounds. No stridor. No wheezing, rhonchi or rales.   Chest:      Chest wall: No tenderness.   Abdominal:      General: Bowel sounds are normal.      Palpations: Abdomen is soft. There is no mass.      Tenderness: There is no abdominal tenderness. There is no guarding or rebound.   Musculoskeletal:         General: No swelling or deformity. Normal range of motion.      Cervical back: Normal range of motion and neck supple. No rigidity or tenderness.      Right lower leg: No edema.      Left lower leg: No edema.   Lymphadenopathy:      Cervical: No cervical adenopathy.   Skin:     General: Skin is warm and dry.      Coloration: Skin is not jaundiced.      Findings: No bruising or erythema.   Neurological:      General: No focal deficit present.      Mental Status: He is alert and oriented to person, place, and time. Mental status is at baseline.      Cranial Nerves: No cranial nerve deficit.      Motor: No weakness.      Coordination: Coordination normal.      Gait: Gait normal.   Psychiatric:         Mood and Affect: Mood normal.         Behavior: Behavior normal.         Thought Content: Thought content normal.         Judgment: Judgment normal.         Assessment/Plan   Problem List Items Addressed This Visit             ICD-10-CM        Cardiac and Vasculature    Atrial fibrillation (Multi) I48.91     Clinically stable. F/up with cardiology. Remains on Eliquis.         Benign essential hypertension I10     Controlled. Continue current treatment.          Hyperlipidemia - Primary E78.5     Continue statin.          Relevant Orders    Comprehensive Metabolic Panel    Lipid Panel    TSH with reflex to Free T4 if abnormal       Genitourinary and Reproductive    Benign prostatic hyperplasia with lower urinary tract symptoms N40.1     Clinically stable. Continue Tamsulosin.         Relevant Medications    tamsulosin (Flomax) 0.4 mg 24 hr capsule    Nephrolithiasis N20.0     Clinically stable. F/up with urology Dr. Matthew.            Hematology and Neoplasia    Anemia D64.9    Relevant Orders    CBC    Iron and TIBC    Vitamin B12       Multi-system (Lupus, Sarcoid)    Rheumatoid arthritis M06.9     Clinically stable. F/up with rheumatology.        It was a pleasure to see you today.  I would like to remind you about importance of a healthy lifestyle in order to improve your well-being and live longer.  Try to engage in physical activities for at least 150 minutes per week.  Eat about 10 servings of fruits and vegetables daily. My advice is 2 servings of fruits and 8 servings of vegetables.  For vegetables choose at least half of them green and at least half of them fresh.  Please avoid sugar, salt, fried food and saturated fat.    I spent a total of 32 minutes on the date of service for follow up visit, which included preparing to see the patient, face-to-face patient care, completing clinical documentation, obtaining and/or reviewing separately obtained history, performing a medically appropriate examination, counseling and educating the patient/family/caregiver, ordering medications, tests, or procedures, communicating with other health care providers (not separately reported), independently interpreting results (not separately reported),  communicating results to the patient/family/caregiver, and care coordination (not separately reported).      F/up in 6 months or sooner if needed.

## 2024-11-26 ENCOUNTER — HOSPITAL ENCOUNTER (OUTPATIENT)
Dept: RADIOLOGY | Facility: HOSPITAL | Age: 84
Discharge: HOME | End: 2024-11-26
Payer: MEDICARE

## 2024-11-26 DIAGNOSIS — N20.0 KIDNEY STONE: ICD-10-CM

## 2024-11-26 PROCEDURE — 74018 RADEX ABDOMEN 1 VIEW: CPT

## 2024-11-30 ENCOUNTER — APPOINTMENT (OUTPATIENT)
Dept: RADIOLOGY | Facility: HOSPITAL | Age: 84
End: 2024-11-30
Payer: MEDICARE

## 2024-12-05 ENCOUNTER — APPOINTMENT (OUTPATIENT)
Dept: UROLOGY | Facility: CLINIC | Age: 84
End: 2024-12-05
Payer: MEDICARE

## 2024-12-05 VITALS — DIASTOLIC BLOOD PRESSURE: 75 MMHG | HEART RATE: 73 BPM | SYSTOLIC BLOOD PRESSURE: 122 MMHG

## 2024-12-05 DIAGNOSIS — N20.0 KIDNEY STONE: Primary | ICD-10-CM

## 2024-12-05 PROCEDURE — 1157F ADVNC CARE PLAN IN RCRD: CPT | Performed by: NURSE PRACTITIONER

## 2024-12-05 PROCEDURE — 1123F ACP DISCUSS/DSCN MKR DOCD: CPT | Performed by: NURSE PRACTITIONER

## 2024-12-05 PROCEDURE — 3074F SYST BP LT 130 MM HG: CPT | Performed by: NURSE PRACTITIONER

## 2024-12-05 PROCEDURE — 99213 OFFICE O/P EST LOW 20 MIN: CPT | Performed by: NURSE PRACTITIONER

## 2024-12-05 PROCEDURE — 1159F MED LIST DOCD IN RCRD: CPT | Performed by: NURSE PRACTITIONER

## 2024-12-05 PROCEDURE — 3078F DIAST BP <80 MM HG: CPT | Performed by: NURSE PRACTITIONER

## 2024-12-05 NOTE — PROGRESS NOTES
Subjective   Patient ID: Joce Galeana is a 84 y.o. male who presents for KUB Results .  Patient is s/p left ureteroscopy, laser lithotripsy, stone extraction, and left stent replacement on 4/8/24 with Dr. Matthew. He previously had 2 additional stone treatments. Since that time he has been drinking large amounts of water and is feeling moderately well. Stones were found to be uric acid.         Review of Systems   All other systems reviewed and are negative.      Objective   Physical Exam  Vitals reviewed.     Alert and oriented x3  Moist mucous membranes  Breathes easily on room air  Abdomen soft, nondistended. Obese  No edema  No scleral icterus  No focal neurological deficits  Appears stated age, no acute distress    === 11/26/24 ===    XR ABDOMEN 1 VIEW    - Impression -  1. No radiopaque renal calculi seen    MACRO:  None    Signed by: Jovani Coppola 11/27/2024 8:21 PM  Dictation workstation:   [id number]      Assessment/Plan   Diagnoses and all orders for this visit:  Kidney stone  -     XR abdomen 2 views supine and erect or decub; Future    No stones noted on KUB. Given reassurance. Encouraged adequate water intake. Plan for annual follow up or sooner with recurrent symptoms. Patient verbalized understanding.          Luz Maria Giordano, NORY-CNP 12/05/24 11:12 AM

## 2025-02-11 ENCOUNTER — HOSPITAL ENCOUNTER (EMERGENCY)
Facility: HOSPITAL | Age: 85
Discharge: HOME | End: 2025-02-11
Attending: EMERGENCY MEDICINE
Payer: MEDICARE

## 2025-02-11 ENCOUNTER — APPOINTMENT (OUTPATIENT)
Dept: CARDIOLOGY | Facility: HOSPITAL | Age: 85
End: 2025-02-11
Payer: MEDICARE

## 2025-02-11 ENCOUNTER — APPOINTMENT (OUTPATIENT)
Dept: RADIOLOGY | Facility: HOSPITAL | Age: 85
End: 2025-02-11
Payer: MEDICARE

## 2025-02-11 VITALS
BODY MASS INDEX: 32.44 KG/M2 | RESPIRATION RATE: 18 BRPM | HEART RATE: 69 BPM | OXYGEN SATURATION: 96 % | SYSTOLIC BLOOD PRESSURE: 167 MMHG | DIASTOLIC BLOOD PRESSURE: 87 MMHG | WEIGHT: 190 LBS | TEMPERATURE: 97 F | HEIGHT: 64 IN

## 2025-02-11 DIAGNOSIS — N20.0 NEPHROLITHIASIS: Primary | ICD-10-CM

## 2025-02-11 LAB
ALBUMIN SERPL BCP-MCNC: 4.2 G/DL (ref 3.4–5)
ALP SERPL-CCNC: 43 U/L (ref 33–136)
ALT SERPL W P-5'-P-CCNC: 28 U/L (ref 10–52)
ANION GAP SERPL CALC-SCNC: 14 MMOL/L (ref 10–20)
APPEARANCE UR: CLEAR
AST SERPL W P-5'-P-CCNC: 37 U/L (ref 9–39)
BASOPHILS # BLD AUTO: 0.02 X10*3/UL (ref 0–0.1)
BASOPHILS NFR BLD AUTO: 0.4 %
BILIRUB SERPL-MCNC: 0.5 MG/DL (ref 0–1.2)
BILIRUB UR STRIP.AUTO-MCNC: NEGATIVE MG/DL
BUN SERPL-MCNC: 30 MG/DL (ref 6–23)
CALCIUM SERPL-MCNC: 9.3 MG/DL (ref 8.6–10.3)
CARDIAC TROPONIN I PNL SERPL HS: 7 NG/L (ref 0–20)
CHLORIDE SERPL-SCNC: 106 MMOL/L (ref 98–107)
CO2 SERPL-SCNC: 25 MMOL/L (ref 21–32)
COLOR UR: NORMAL
CREAT SERPL-MCNC: 1.42 MG/DL (ref 0.5–1.3)
EGFRCR SERPLBLD CKD-EPI 2021: 49 ML/MIN/1.73M*2
EOSINOPHIL # BLD AUTO: 0.03 X10*3/UL (ref 0–0.4)
EOSINOPHIL NFR BLD AUTO: 0.7 %
ERYTHROCYTE [DISTWIDTH] IN BLOOD BY AUTOMATED COUNT: 13.9 % (ref 11.5–14.5)
GLUCOSE SERPL-MCNC: 98 MG/DL (ref 74–99)
GLUCOSE UR STRIP.AUTO-MCNC: NORMAL MG/DL
HCT VFR BLD AUTO: 44.3 % (ref 41–52)
HGB BLD-MCNC: 14.2 G/DL (ref 13.5–17.5)
HOLD SPECIMEN: NORMAL
IMM GRANULOCYTES # BLD AUTO: 0.02 X10*3/UL (ref 0–0.5)
IMM GRANULOCYTES NFR BLD AUTO: 0.4 % (ref 0–0.9)
KETONES UR STRIP.AUTO-MCNC: NEGATIVE MG/DL
LEUKOCYTE ESTERASE UR QL STRIP.AUTO: NEGATIVE
LIPASE SERPL-CCNC: 15 U/L (ref 9–82)
LYMPHOCYTES # BLD AUTO: 1.11 X10*3/UL (ref 0.8–3)
LYMPHOCYTES NFR BLD AUTO: 24.5 %
MCH RBC QN AUTO: 34.4 PG (ref 26–34)
MCHC RBC AUTO-ENTMCNC: 32.1 G/DL (ref 32–36)
MCV RBC AUTO: 107 FL (ref 80–100)
MONOCYTES # BLD AUTO: 0.87 X10*3/UL (ref 0.05–0.8)
MONOCYTES NFR BLD AUTO: 19.2 %
NEUTROPHILS # BLD AUTO: 2.48 X10*3/UL (ref 1.6–5.5)
NEUTROPHILS NFR BLD AUTO: 54.8 %
NITRITE UR QL STRIP.AUTO: NEGATIVE
NRBC BLD-RTO: 0 /100 WBCS (ref 0–0)
PH UR STRIP.AUTO: 5 [PH]
PLATELET # BLD AUTO: 131 X10*3/UL (ref 150–450)
POTASSIUM SERPL-SCNC: 4.7 MMOL/L (ref 3.5–5.3)
PROT SERPL-MCNC: 7 G/DL (ref 6.4–8.2)
PROT UR STRIP.AUTO-MCNC: NEGATIVE MG/DL
RBC # BLD AUTO: 4.13 X10*6/UL (ref 4.5–5.9)
RBC # UR STRIP.AUTO: NEGATIVE MG/DL
SODIUM SERPL-SCNC: 140 MMOL/L (ref 136–145)
SP GR UR STRIP.AUTO: 1.02
UROBILINOGEN UR STRIP.AUTO-MCNC: NORMAL MG/DL
WBC # BLD AUTO: 4.5 X10*3/UL (ref 4.4–11.3)

## 2025-02-11 PROCEDURE — 99285 EMERGENCY DEPT VISIT HI MDM: CPT | Mod: 25 | Performed by: EMERGENCY MEDICINE

## 2025-02-11 PROCEDURE — 83690 ASSAY OF LIPASE: CPT

## 2025-02-11 PROCEDURE — 2500000004 HC RX 250 GENERAL PHARMACY W/ HCPCS (ALT 636 FOR OP/ED)

## 2025-02-11 PROCEDURE — 81003 URINALYSIS AUTO W/O SCOPE: CPT

## 2025-02-11 PROCEDURE — 74177 CT ABD & PELVIS W/CONTRAST: CPT

## 2025-02-11 PROCEDURE — 74177 CT ABD & PELVIS W/CONTRAST: CPT | Performed by: RADIOLOGY

## 2025-02-11 PROCEDURE — 85025 COMPLETE CBC W/AUTO DIFF WBC: CPT

## 2025-02-11 PROCEDURE — 80053 COMPREHEN METABOLIC PANEL: CPT

## 2025-02-11 PROCEDURE — 84484 ASSAY OF TROPONIN QUANT: CPT

## 2025-02-11 PROCEDURE — 36415 COLL VENOUS BLD VENIPUNCTURE: CPT

## 2025-02-11 PROCEDURE — 96375 TX/PRO/DX INJ NEW DRUG ADDON: CPT

## 2025-02-11 PROCEDURE — 99285 EMERGENCY DEPT VISIT HI MDM: CPT | Performed by: EMERGENCY MEDICINE

## 2025-02-11 PROCEDURE — 96374 THER/PROPH/DIAG INJ IV PUSH: CPT

## 2025-02-11 PROCEDURE — 93005 ELECTROCARDIOGRAM TRACING: CPT

## 2025-02-11 PROCEDURE — 96361 HYDRATE IV INFUSION ADD-ON: CPT

## 2025-02-11 PROCEDURE — 2550000001 HC RX 255 CONTRASTS

## 2025-02-11 RX ORDER — KETOROLAC TROMETHAMINE 15 MG/ML
15 INJECTION, SOLUTION INTRAMUSCULAR; INTRAVENOUS ONCE
Status: COMPLETED | OUTPATIENT
Start: 2025-02-11 | End: 2025-02-11

## 2025-02-11 RX ORDER — MORPHINE SULFATE 4 MG/ML
4 INJECTION, SOLUTION INTRAMUSCULAR; INTRAVENOUS ONCE
Status: COMPLETED | OUTPATIENT
Start: 2025-02-11 | End: 2025-02-11

## 2025-02-11 RX ORDER — ONDANSETRON HYDROCHLORIDE 2 MG/ML
4 INJECTION, SOLUTION INTRAVENOUS ONCE
Status: COMPLETED | OUTPATIENT
Start: 2025-02-11 | End: 2025-02-11

## 2025-02-11 RX ADMIN — ONDANSETRON 4 MG: 2 INJECTION INTRAMUSCULAR; INTRAVENOUS at 06:54

## 2025-02-11 RX ADMIN — IOHEXOL 75 ML: 350 INJECTION, SOLUTION INTRAVENOUS at 08:13

## 2025-02-11 RX ADMIN — SODIUM CHLORIDE 1000 ML: 9 INJECTION, SOLUTION INTRAVENOUS at 06:53

## 2025-02-11 RX ADMIN — KETOROLAC TROMETHAMINE 15 MG: 15 INJECTION, SOLUTION INTRAMUSCULAR; INTRAVENOUS at 10:52

## 2025-02-11 RX ADMIN — MORPHINE SULFATE 4 MG: 4 INJECTION, SOLUTION INTRAMUSCULAR; INTRAVENOUS at 06:55

## 2025-02-11 ASSESSMENT — PAIN - FUNCTIONAL ASSESSMENT: PAIN_FUNCTIONAL_ASSESSMENT: 0-10

## 2025-02-11 ASSESSMENT — PAIN DESCRIPTION - ORIENTATION: ORIENTATION: LEFT

## 2025-02-11 ASSESSMENT — COLUMBIA-SUICIDE SEVERITY RATING SCALE - C-SSRS
2. HAVE YOU ACTUALLY HAD ANY THOUGHTS OF KILLING YOURSELF?: NO
1. IN THE PAST MONTH, HAVE YOU WISHED YOU WERE DEAD OR WISHED YOU COULD GO TO SLEEP AND NOT WAKE UP?: NO
6. HAVE YOU EVER DONE ANYTHING, STARTED TO DO ANYTHING, OR PREPARED TO DO ANYTHING TO END YOUR LIFE?: NO

## 2025-02-11 ASSESSMENT — PAIN DESCRIPTION - LOCATION: LOCATION: OTHER (COMMENT)

## 2025-02-11 ASSESSMENT — PAIN SCALES - GENERAL
PAINLEVEL_OUTOF10: 6
PAINLEVEL_OUTOF10: 4

## 2025-02-11 NOTE — ED TRIAGE NOTES
Pt states he has had left flank pain since [place]. Pain has been getting worse. This morning Pt states he felt increased weakness with dizziness. Pt states he just didn't feel right.

## 2025-02-11 NOTE — ED PROVIDER NOTES
EMERGENCY DEPARTMENT ENCOUNTER      Pt Name: Joce Galeana  MRN: [id number]  Birthdate 1940  Date of evaluation: 2/11/2025  Provider: Americo Inman MD    CHIEF COMPLAINT       Chief Complaint   Patient presents with    Flank Pain         HISTORY OF PRESENT ILLNESS    HPI  Patient is an 84-year-old male with history of kidney stones, CKD, A-fib on Eliquis, HTN, HLD presenting with left flank pain.  This been ongoing since [place] but acutely worsened within the last day.  Pain is 7/10, sharp, nonradiating, without consistently being exacerbating factors.  He notes fatigue, weakness.  At 3 AM, he had an episode where he felt lightheaded like he was in a pass out normal fell.  He did not actually do so.  He denies fever, sweats, chills, chest pain, shortness of breath, nausea, vomiting, and dysuria, hematuria, diarrhea, dark or bloody stools.    Nursing Notes were reviewed.    PAST MEDICAL HISTORY     Past Medical History:   Diagnosis Date    Arrhythmia     afib    Atrial fibrillation (Multi)     BPH (benign prostatic hyperplasia)     Bradycardia, unspecified 02/08/2019    Persistent sinus bradycardia    CKD (chronic kidney disease), stage III (Multi)     Hyperlipidemia     Hypertension     Irregular heart beat     Long term current use of anticoagulant     Nephrolithiasis     Other conditions influencing health status     Skin Cancer    Personal history of other diseases of the circulatory system     History of hypertension    Personal history of other diseases of the circulatory system     History of cardiac disorder    PMR (polymyalgia rheumatica) (Multi)     Rheumatoid arthritis, unspecified     Rheumatoid arthritis         SURGICAL HISTORY       Past Surgical History:   Procedure Laterality Date    COLONOSCOPY  12/03/2014    Complete Colonoscopy    LITHOTRIPSY      OTHER SURGICAL HISTORY      MOHS    ROTATOR CUFF REPAIR  12/20/2013    Rotator Cuff Repair    URETERAL STENT PLACEMENT           CURRENT  MEDICATIONS       Previous Medications    0.9 % SODIUM CHLORIDE (SODIUM CHLORIDE 0.9%) SOLUTION    Infuse 100 mL/hr at 100 mL/hr into a venous catheter.    ACETAMINOPHEN (TYLENOL EXTRA STRENGTH) 500 MG TABLET    Take 2 tablets (1,000 mg) by mouth 2 times a day. Morning and evening    ALBUTEROL 2.5 MG /3 ML (0.083 %) NEBULIZER SOLUTION    Inhale 3 mL (2.5 mg).    ATORVASTATIN (LIPITOR) 20 MG TABLET    TAKE 1 TABLET BY MOUTH ONCE  DAILY    DICLOFENAC SODIUM (VOLTAREN) 1 % GEL    Apply 4.5 inches (4 g) topically 2 times a day as needed (pain).    ELIQUIS 5 MG TABLET    Take 1 tablet (5 mg) by mouth 2 times a day.    ENALAPRIL (VASOTEC) 10 MG TABLET    TAKE 1 TABLET BY MOUTH DAILY    FOLIC ACID (FOLVITE) 1 MG TABLET    Take 1 tablet (1 mg) by mouth once daily.    HYDRALAZINE (APRESOLINE) 20 MG/ML INJECTION    Infuse 0.25 mL (5 mg) into a venous catheter.    LATANOPROST (XALATAN) 0.005 % OPHTHALMIC SOLUTION    Administer 1 drop into both eyes once daily at bedtime.    MECLIZINE (ANTIVERT) 25 MG TABLET    Take 1 tablet (25 mg) by mouth 2 times a day as needed for dizziness.    MENTHOL (BIOFREEZE, MENTHOL,) 4 % GEL GEL    Apply 1 Application topically 2 times a day as needed (pain).    METHOTREXATE (TREXALL) 2.5 MG TABLET    Take 6 tablets (15 mg total) by mouth 1 (one) time per week.  Sunday    METOPROLOL SUCCINATE XL (TOPROL-XL) 25 MG 24 HR TABLET    Take 3 tablets (75 mg) by mouth 2 times a day.    PREDNISONE (DELTASONE) 1 MG TABLET    Take 4 tablets (4 mg) by mouth once daily.    TAMSULOSIN (FLOMAX) 0.4 MG 24 HR CAPSULE    Take 1 capsule (0.4 mg) by mouth 2 times a day.    TRAMADOL (ULTRAM) 50 MG TABLET    Take 1 tablet (50 mg) by mouth 2 times a day as needed.    UNABLE TO FIND    Apply 1 Application topically 2 times a day as needed (pain). Med Name: Blue Emu gel    WHEAT DEXTRIN (BENEFIBER CLEAR SF, DEXTRIN,) 3 GRAM/3.5 GRAM POWDER IN PACKET    Take 2 Scoops by mouth once daily.       ALLERGIES     Patient has no  known allergies.    FAMILY HISTORY       Family History   Problem Relation Name Age of Onset    Hypertension Mother      Heart disease Mother      Skin cancer Mother      Hypertension Father      Heart attack Father            SOCIAL HISTORY       Social History     Socioeconomic History    Marital status:    Tobacco Use    Smoking status: Never     Passive exposure: Never    Smokeless tobacco: Never   Vaping Use    Vaping status: Never Used   Substance and Sexual Activity    Alcohol use: Not Currently    Drug use: Never    Sexual activity: Defer     Social Drivers of Health     Financial Resource Strain: Low Risk  (2/21/2024)    Overall Financial Resource Strain (CARDIA)     Difficulty of Paying Living Expenses: Not hard at all   Transportation Needs: No Transportation Needs (2/21/2024)    PRAPARE - Transportation     Lack of Transportation (Medical): No     Lack of Transportation (Non-Medical): No   Housing Stability: Low Risk  (2/21/2024)    Housing Stability Vital Sign     Unable to Pay for Housing in the Last Year: No     Number of Places Lived in the Last Year: 1     Unstable Housing in the Last Year: No       SCREENINGS                        PHYSICAL EXAM    (up to 7 for level 4, 8 or more for level 5)     ED Triage Vitals [02/11/25 0610]   Temperature Heart Rate Respirations BP   36.1 °C (97 °F) 73 18 (!) 205/107      Pulse Ox Temp Source Heart Rate Source Patient Position   98 % Temporal -- Sitting      BP Location FiO2 (%)     Right arm --       Physical Exam  Constitutional:       General: He is not in acute distress.     Appearance: He is not ill-appearing.   HENT:      Head: Normocephalic and atraumatic.      Nose: Nose normal.      Mouth/Throat:      Mouth: Mucous membranes are moist.      Pharynx: Oropharynx is clear.   Eyes:      Extraocular Movements: Extraocular movements intact.      Conjunctiva/sclera: Conjunctivae normal.   Cardiovascular:      Rate and Rhythm: Normal rate and regular  rhythm.      Heart sounds: Normal heart sounds.   Pulmonary:      Effort: Pulmonary effort is normal. No respiratory distress.      Breath sounds: Normal breath sounds.   Abdominal:      General: There is no distension.      Palpations: Abdomen is soft.      Tenderness: There is left CVA tenderness. There is no right CVA tenderness, guarding or rebound.      Comments: Tender to palpation in the left upper quadrant   Musculoskeletal:      Cervical back: Normal range of motion and neck supple.          DIAGNOSTIC RESULTS     LABS:  Labs Reviewed   TROPONIN SERIES- (INITIAL, 1 HR)    Narrative:     The following orders were created for panel order Troponin Series, (0, 1 HR).  Procedure                               Abnormality         Status                     ---------                               -----------         ------                     Troponin I, High Sensiti...[[id number]]                                                   Please view results for these tests on the individual orders.   CBC WITH AUTO DIFFERENTIAL   COMPREHENSIVE METABOLIC PANEL   LIPASE   URINALYSIS WITH REFLEX CULTURE AND MICROSCOPIC    Narrative:     The following orders were created for panel order Urinalysis with Reflex Culture and Microscopic.  Procedure                               Abnormality         Status                     ---------                               -----------         ------                     Urinalysis with Reflex C...[[id number]]                                                 Extra Urine Gray Tube[[id number]]                                                         Please view results for these tests on the individual orders.   SERIAL TROPONIN-INITIAL   URINALYSIS WITH REFLEX CULTURE AND MICROSCOPIC   EXTRA URINE GRAY TUBE       All other labs were within normal range or not returned as of this dictation.    Imaging  CT abdomen pelvis w IV contrast    (Results Pending)        Procedures  Procedures     EMERGENCY  DEPARTMENT COURSE/MDM:         Medical Decision Making  History obtained from the patient and his family.  Records including labs, imaging, notes independently reviewed by me.  Presentation concerning for possible kidney stone, UTI, musculoskeletal pain.  With regards to the lightheadedness and possible presyncope, concern for possible cardiac event, dehydration.  Patient given morphine, Zofran, IV fluid bolus.  Labs, urinalysis, CT of the abdomen pelvis were pending at the time of handoff to the oncoming provider.    Patient and or family in agreement and understanding of treatment plan.  All questions answered.      I reviewed the case with the attending ED physician. The attending ED physician agrees with the plan. Patient and/or patient´s representative was counseled regarding labs, imaging, likely diagnosis, and plan. All questions were answered.    ED Medications administered this visit:    Medications   sodium chloride 0.9 % bolus 1,000 mL (has no administration in time range)   ondansetron (Zofran) injection 4 mg (has no administration in time range)   morphine injection 4 mg (has no administration in time range)       New Prescriptions from this visit:    New Prescriptions    No medications on file       Follow-up:  No follow-up provider specified.      Final Impression: No diagnosis found.      (Please note that portions of this note were completed with a voice recognition program.  Efforts were made to edit the dictations but occasionally words are mis-transcribed.)     Americo Inman MD  Resident  02/11/25 [id number]

## 2025-02-11 NOTE — DISCHARGE INSTRUCTIONS
"You were seen in the Emergency Department (ED) for you flank pain. Your work-up and exam was unimpressive for life or limb-threatening cause at this time.    Please utilize anti-inflammatories acetaminophen (Tylenol) and ibuprofen (Advil) or naproxen (aleve) for your pain as recommended. You can take both acetaminophen and ibuprofen together.    Seek immediate medical attention should you have:  Numbness, tingling, weakness, fevers of 38C (100.4) or higher, chills, nausea, palpitations, confusion, vomiting, dizziness, painful urination, inability to control your urine or bowel movements, or any other concerning symptoms.\"  "

## 2025-02-15 ENCOUNTER — APPOINTMENT (OUTPATIENT)
Dept: RADIOLOGY | Facility: HOSPITAL | Age: 85
End: 2025-02-15
Payer: MEDICARE

## 2025-02-15 ENCOUNTER — HOSPITAL ENCOUNTER (EMERGENCY)
Facility: HOSPITAL | Age: 85
Discharge: HOME | End: 2025-02-16
Attending: EMERGENCY MEDICINE
Payer: MEDICARE

## 2025-02-15 VITALS
BODY MASS INDEX: 32.1 KG/M2 | HEART RATE: 82 BPM | OXYGEN SATURATION: 98 % | RESPIRATION RATE: 18 BRPM | SYSTOLIC BLOOD PRESSURE: 157 MMHG | WEIGHT: 188 LBS | DIASTOLIC BLOOD PRESSURE: 94 MMHG | TEMPERATURE: 97.3 F | HEIGHT: 64 IN

## 2025-02-15 DIAGNOSIS — R10.9 FLANK PAIN: Primary | ICD-10-CM

## 2025-02-15 LAB
ALBUMIN SERPL BCP-MCNC: 3.6 G/DL (ref 3.4–5)
ALP SERPL-CCNC: 40 U/L (ref 33–136)
ALT SERPL W P-5'-P-CCNC: 19 U/L (ref 10–52)
ANION GAP SERPL CALC-SCNC: 15 MMOL/L (ref 10–20)
APPEARANCE UR: CLEAR
AST SERPL W P-5'-P-CCNC: 25 U/L (ref 9–39)
BASOPHILS # BLD AUTO: 0 X10*3/UL (ref 0–0.1)
BASOPHILS NFR BLD AUTO: 0 %
BILIRUB SERPL-MCNC: 0.7 MG/DL (ref 0–1.2)
BILIRUB UR STRIP.AUTO-MCNC: NEGATIVE MG/DL
BUN SERPL-MCNC: 25 MG/DL (ref 6–23)
CALCIUM SERPL-MCNC: 8.1 MG/DL (ref 8.6–10.3)
CHLORIDE SERPL-SCNC: 104 MMOL/L (ref 98–107)
CO2 SERPL-SCNC: 19 MMOL/L (ref 21–32)
COLOR UR: YELLOW
CREAT SERPL-MCNC: 1.25 MG/DL (ref 0.5–1.3)
EGFRCR SERPLBLD CKD-EPI 2021: 57 ML/MIN/1.73M*2
EOSINOPHIL # BLD AUTO: 0.05 X10*3/UL (ref 0–0.4)
EOSINOPHIL NFR BLD AUTO: 0.8 %
ERYTHROCYTE [DISTWIDTH] IN BLOOD BY AUTOMATED COUNT: 13.2 % (ref 11.5–14.5)
GIANT PLATELETS BLD QL SMEAR: NORMAL
GLUCOSE SERPL-MCNC: 124 MG/DL (ref 74–99)
GLUCOSE UR STRIP.AUTO-MCNC: NORMAL MG/DL
HCT VFR BLD AUTO: 39.3 % (ref 41–52)
HGB BLD-MCNC: 13 G/DL (ref 13.5–17.5)
HYALINE CASTS #/AREA URNS AUTO: ABNORMAL /LPF
IMM GRANULOCYTES # BLD AUTO: 0.03 X10*3/UL (ref 0–0.5)
IMM GRANULOCYTES NFR BLD AUTO: 0.5 % (ref 0–0.9)
KETONES UR STRIP.AUTO-MCNC: NEGATIVE MG/DL
LEUKOCYTE ESTERASE UR QL STRIP.AUTO: NEGATIVE
LYMPHOCYTES # BLD AUTO: 0.63 X10*3/UL (ref 0.8–3)
LYMPHOCYTES NFR BLD AUTO: 10.1 %
MCH RBC QN AUTO: 34.5 PG (ref 26–34)
MCHC RBC AUTO-ENTMCNC: 33.1 G/DL (ref 32–36)
MCV RBC AUTO: 104 FL (ref 80–100)
MONOCYTES # BLD AUTO: 0.81 X10*3/UL (ref 0.05–0.8)
MONOCYTES NFR BLD AUTO: 13 %
NEUTROPHILS # BLD AUTO: 4.69 X10*3/UL (ref 1.6–5.5)
NEUTROPHILS NFR BLD AUTO: 75.6 %
NITRITE UR QL STRIP.AUTO: NEGATIVE
NRBC BLD-RTO: 0 /100 WBCS (ref 0–0)
PH UR STRIP.AUTO: 5 [PH]
PLATELET # BLD AUTO: 118 X10*3/UL (ref 150–450)
POTASSIUM SERPL-SCNC: 4.7 MMOL/L (ref 3.5–5.3)
PROT SERPL-MCNC: 5.9 G/DL (ref 6.4–8.2)
PROT UR STRIP.AUTO-MCNC: ABNORMAL MG/DL
Q ONSET: 217 MS
QRS COUNT: 12 BEATS
QRS DURATION: 80 MS
QT INTERVAL: 404 MS
QTC CALCULATION(BAZETT): 439 MS
QTC FREDERICIA: 427 MS
R AXIS: -34 DEGREES
RBC # BLD AUTO: 3.77 X10*6/UL (ref 4.5–5.9)
RBC # UR STRIP.AUTO: NEGATIVE MG/DL
RBC #/AREA URNS AUTO: ABNORMAL /HPF
RBC MORPH BLD: NORMAL
SODIUM SERPL-SCNC: 133 MMOL/L (ref 136–145)
SP GR UR STRIP.AUTO: 1.02
T AXIS: -26 DEGREES
T OFFSET: 419 MS
UROBILINOGEN UR STRIP.AUTO-MCNC: NORMAL MG/DL
VENTRICULAR RATE: 71 BPM
WBC # BLD AUTO: 6.2 X10*3/UL (ref 4.4–11.3)
WBC #/AREA URNS AUTO: ABNORMAL /HPF

## 2025-02-15 PROCEDURE — 74176 CT ABD & PELVIS W/O CONTRAST: CPT

## 2025-02-15 PROCEDURE — 96375 TX/PRO/DX INJ NEW DRUG ADDON: CPT

## 2025-02-15 PROCEDURE — 96374 THER/PROPH/DIAG INJ IV PUSH: CPT

## 2025-02-15 PROCEDURE — 2500000004 HC RX 250 GENERAL PHARMACY W/ HCPCS (ALT 636 FOR OP/ED)

## 2025-02-15 PROCEDURE — 85025 COMPLETE CBC W/AUTO DIFF WBC: CPT | Performed by: EMERGENCY MEDICINE

## 2025-02-15 PROCEDURE — 99284 EMERGENCY DEPT VISIT MOD MDM: CPT | Mod: 25 | Performed by: EMERGENCY MEDICINE

## 2025-02-15 PROCEDURE — 80053 COMPREHEN METABOLIC PANEL: CPT | Performed by: EMERGENCY MEDICINE

## 2025-02-15 PROCEDURE — 81001 URINALYSIS AUTO W/SCOPE: CPT | Performed by: EMERGENCY MEDICINE

## 2025-02-15 PROCEDURE — 81001 URINALYSIS AUTO W/SCOPE: CPT | Performed by: STUDENT IN AN ORGANIZED HEALTH CARE EDUCATION/TRAINING PROGRAM

## 2025-02-15 PROCEDURE — 36415 COLL VENOUS BLD VENIPUNCTURE: CPT | Performed by: EMERGENCY MEDICINE

## 2025-02-15 PROCEDURE — 74176 CT ABD & PELVIS W/O CONTRAST: CPT | Performed by: RADIOLOGY

## 2025-02-15 RX ORDER — ONDANSETRON HYDROCHLORIDE 2 MG/ML
4 INJECTION, SOLUTION INTRAVENOUS ONCE
Status: COMPLETED | OUTPATIENT
Start: 2025-02-15 | End: 2025-02-15

## 2025-02-15 RX ORDER — MORPHINE SULFATE 4 MG/ML
4 INJECTION, SOLUTION INTRAMUSCULAR; INTRAVENOUS ONCE
Status: COMPLETED | OUTPATIENT
Start: 2025-02-15 | End: 2025-02-15

## 2025-02-15 RX ORDER — OXYCODONE HYDROCHLORIDE 5 MG/1
5 TABLET ORAL EVERY 8 HOURS PRN
Qty: 9 TABLET | Refills: 0 | Status: SHIPPED | OUTPATIENT
Start: 2025-02-15 | End: 2025-02-15

## 2025-02-15 RX ORDER — OXYCODONE HYDROCHLORIDE 5 MG/1
5 TABLET ORAL EVERY 8 HOURS PRN
Qty: 9 TABLET | Refills: 0 | Status: SHIPPED | OUTPATIENT
Start: 2025-02-15 | End: 2025-02-18

## 2025-02-15 RX ADMIN — ONDANSETRON 4 MG: 2 INJECTION INTRAMUSCULAR; INTRAVENOUS at 22:09

## 2025-02-15 RX ADMIN — MORPHINE SULFATE 4 MG: 4 INJECTION, SOLUTION INTRAMUSCULAR; INTRAVENOUS at 22:09

## 2025-02-15 ASSESSMENT — COLUMBIA-SUICIDE SEVERITY RATING SCALE - C-SSRS
6. HAVE YOU EVER DONE ANYTHING, STARTED TO DO ANYTHING, OR PREPARED TO DO ANYTHING TO END YOUR LIFE?: NO
1. IN THE PAST MONTH, HAVE YOU WISHED YOU WERE DEAD OR WISHED YOU COULD GO TO SLEEP AND NOT WAKE UP?: NO
2. HAVE YOU ACTUALLY HAD ANY THOUGHTS OF KILLING YOURSELF?: NO

## 2025-02-15 ASSESSMENT — PAIN SCALES - GENERAL
PAINLEVEL_OUTOF10: 5 - MODERATE PAIN
PAINLEVEL_OUTOF10: 0 - NO PAIN
PAINLEVEL_OUTOF10: 5 - MODERATE PAIN
PAINLEVEL_OUTOF10: 6

## 2025-02-15 ASSESSMENT — LIFESTYLE VARIABLES
EVER FELT BAD OR GUILTY ABOUT YOUR DRINKING: NO
TOTAL SCORE: 0
HAVE PEOPLE ANNOYED YOU BY CRITICIZING YOUR DRINKING: NO
EVER HAD A DRINK FIRST THING IN THE MORNING TO STEADY YOUR NERVES TO GET RID OF A HANGOVER: NO
HAVE YOU EVER FELT YOU SHOULD CUT DOWN ON YOUR DRINKING: NO

## 2025-02-15 ASSESSMENT — PAIN - FUNCTIONAL ASSESSMENT
PAIN_FUNCTIONAL_ASSESSMENT: 0-10
PAIN_FUNCTIONAL_ASSESSMENT: 0-10

## 2025-02-16 LAB — HOLD SPECIMEN: NORMAL

## 2025-02-16 NOTE — ED PROVIDER NOTES
"EMERGENCY DEPARTMENT ENCOUNTER      Pt Name: Joce Galeana  MRN: [id number]  Birthdate 1940  Date of evaluation: 2/15/2025  Provider: Jaquan Pandey DO    CHIEF COMPLAINT       Chief Complaint   Patient presents with    Flank Pain     Pt was in ED on Tuesday diagnosed with kidney stones. Was told to try and pass stones at home but pt is still having ongoing L flank pain since Tuesday. EMS gave .5 of dilaudid brought pain from 10 to a 5.          HISTORY OF PRESENT ILLNESS    84-year-old male, history of hypertension, hyperlipidemia, kidney stone, atrial fibrillation on Eliquis, chronic kidney disease, comes emergency with left flank pain, nonradiating, pain all the time, worsened with movement or \"when he tries to do anything\" comes emergency room today with worsening pain.  He was here 4 days ago, told it a kidney stone in the left that would pass on its own, does see a urologist, Dr. Matthew.  He said he was doing good for the first 2 days after he was discharged from the ER however the last 2 days the pain has been acutely getting worse.  No nausea, vomiting, no frequency, urgency, hematuria or dysuria.  No new falls or injuries, no other symptoms.      History provided by:  Patient      Nursing Notes were reviewed.    PAST MEDICAL HISTORY     Past Medical History:   Diagnosis Date    Arrhythmia     afib    Atrial fibrillation (Multi)     BPH (benign prostatic hyperplasia)     Bradycardia, unspecified 02/08/2019    Persistent sinus bradycardia    CKD (chronic kidney disease), stage III (Multi)     Hyperlipidemia     Hypertension     Irregular heart beat     Long term current use of anticoagulant     Nephrolithiasis     Other conditions influencing health status     Skin Cancer    Personal history of other diseases of the circulatory system     History of hypertension    Personal history of other diseases of the circulatory system     History of cardiac disorder    PMR (polymyalgia rheumatica) (Multi)     " Rheumatoid arthritis, unspecified     Rheumatoid arthritis         SURGICAL HISTORY       Past Surgical History:   Procedure Laterality Date    COLONOSCOPY  12/03/2014    Complete Colonoscopy    LITHOTRIPSY      OTHER SURGICAL HISTORY      MOHS    ROTATOR CUFF REPAIR  12/20/2013    Rotator Cuff Repair    URETERAL STENT PLACEMENT           CURRENT MEDICATIONS       Previous Medications    0.9 % SODIUM CHLORIDE (SODIUM CHLORIDE 0.9%) SOLUTION    Infuse 100 mL/hr at 100 mL/hr into a venous catheter.    ACETAMINOPHEN (TYLENOL EXTRA STRENGTH) 500 MG TABLET    Take 2 tablets (1,000 mg) by mouth 2 times a day. Morning and evening    ALBUTEROL 2.5 MG /3 ML (0.083 %) NEBULIZER SOLUTION    Inhale 3 mL (2.5 mg).    ATORVASTATIN (LIPITOR) 20 MG TABLET    TAKE 1 TABLET BY MOUTH ONCE  DAILY    DICLOFENAC SODIUM (VOLTAREN) 1 % GEL    Apply 4.5 inches (4 g) topically 2 times a day as needed (pain).    ELIQUIS 5 MG TABLET    Take 1 tablet (5 mg) by mouth 2 times a day.    ENALAPRIL (VASOTEC) 10 MG TABLET    TAKE 1 TABLET BY MOUTH DAILY    FOLIC ACID (FOLVITE) 1 MG TABLET    Take 1 tablet (1 mg) by mouth once daily.    HYDRALAZINE (APRESOLINE) 20 MG/ML INJECTION    Infuse 0.25 mL (5 mg) into a venous catheter.    LATANOPROST (XALATAN) 0.005 % OPHTHALMIC SOLUTION    Administer 1 drop into both eyes once daily at bedtime.    MECLIZINE (ANTIVERT) 25 MG TABLET    Take 1 tablet (25 mg) by mouth 2 times a day as needed for dizziness.    MENTHOL (BIOFREEZE, MENTHOL,) 4 % GEL GEL    Apply 1 Application topically 2 times a day as needed (pain).    METHOTREXATE (TREXALL) 2.5 MG TABLET    Take 6 tablets (15 mg total) by mouth 1 (one) time per week.  Sunday    METOPROLOL SUCCINATE XL (TOPROL-XL) 25 MG 24 HR TABLET    Take 3 tablets (75 mg) by mouth 2 times a day.    PREDNISONE (DELTASONE) 1 MG TABLET    Take 4 tablets (4 mg) by mouth once daily.    TAMSULOSIN (FLOMAX) 0.4 MG 24 HR CAPSULE    Take 1 capsule (0.4 mg) by mouth 2 times a day.     TRAMADOL (ULTRAM) 50 MG TABLET    Take 1 tablet (50 mg) by mouth 2 times a day as needed.    UNABLE TO FIND    Apply 1 Application topically 2 times a day as needed (pain). Med Name: Blue Emu gel    WHEAT DEXTRIN (BENEFIBER CLEAR SF, DEXTRIN,) 3 GRAM/3.5 GRAM POWDER IN PACKET    Take 2 Scoops by mouth once daily.       ALLERGIES     Patient has no known allergies.    FAMILY HISTORY       Family History   Problem Relation Name Age of Onset    Hypertension Mother      Heart disease Mother      Skin cancer Mother      Hypertension Father      Heart attack Father            SOCIAL HISTORY       Social History     Socioeconomic History    Marital status:    Tobacco Use    Smoking status: Never     Passive exposure: Never    Smokeless tobacco: Never   Vaping Use    Vaping status: Never Used   Substance and Sexual Activity    Alcohol use: Not Currently    Drug use: Never    Sexual activity: Defer     Social Drivers of Health     Financial Resource Strain: Low Risk  (2/21/2024)    Overall Financial Resource Strain (CARDIA)     Difficulty of Paying Living Expenses: Not hard at all   Transportation Needs: No Transportation Needs (2/21/2024)    PRAPARE - Transportation     Lack of Transportation (Medical): No     Lack of Transportation (Non-Medical): No   Housing Stability: Low Risk  (2/21/2024)    Housing Stability Vital Sign     Unable to Pay for Housing in the Last Year: No     Number of Places Lived in the Last Year: 1     Unstable Housing in the Last Year: No       SCREENINGS                        PHYSICAL EXAM    (up to 7 for level 4, 8 or more for level 5)     ED Triage Vitals [02/15/25 2004]   Temperature Heart Rate Respirations BP   36.3 °C (97.3 °F) 78 18 (!) 181/79      Pulse Ox Temp Source Heart Rate Source Patient Position   99 % Temporal Monitor --      BP Location FiO2 (%)     -- --       Physical Exam  Vitals and nursing note reviewed.   Constitutional:       General: He is not in acute distress.  HENT:       Head: Normocephalic and atraumatic.   Eyes:      General: No scleral icterus.        Right eye: No discharge.         Left eye: No discharge.      Conjunctiva/sclera: Conjunctivae normal.   Cardiovascular:      Rate and Rhythm: Normal rate and regular rhythm.      Pulses: Normal pulses.   Pulmonary:      Effort: Pulmonary effort is normal.   Abdominal:      General: Abdomen is flat.      Palpations: Abdomen is soft.      Tenderness: There is no abdominal tenderness. There is no guarding or rebound.      Comments: There is some tenderness in the left flank.   Musculoskeletal:         General: No deformity.      Right lower leg: No edema.      Left lower leg: No edema.   Skin:     General: Skin is warm and dry.   Neurological:      Mental Status: He is alert and oriented to person, place, and time. Mental status is at baseline.   Psychiatric:         Mood and Affect: Mood normal.         Behavior: Behavior normal.          DIAGNOSTIC RESULTS     LABS:  Labs Reviewed   CBC WITH AUTO DIFFERENTIAL - Abnormal       Result Value    WBC 6.2      nRBC 0.0      RBC 3.77 (*)     Hemoglobin 13.0 (*)     Hematocrit 39.3 (*)      (*)     MCH 34.5 (*)     MCHC 33.1      RDW 13.2      Platelets 118 (*)     Neutrophils % 75.6      Immature Granulocytes %, Automated 0.5      Lymphocytes % 10.1      Monocytes % 13.0      Eosinophils % 0.8      Basophils % 0.0      Neutrophils Absolute 4.69      Immature Granulocytes Absolute, Automated 0.03      Lymphocytes Absolute 0.63 (*)     Monocytes Absolute 0.81 (*)     Eosinophils Absolute 0.05      Basophils Absolute 0.00     COMPREHENSIVE METABOLIC PANEL - Abnormal    Glucose 124 (*)     Sodium 133 (*)     Potassium 4.7      Chloride 104      Bicarbonate 19 (*)     Anion Gap 15      Urea Nitrogen 25 (*)     Creatinine 1.25      eGFR 57 (*)     Calcium 8.1 (*)     Albumin 3.6      Alkaline Phosphatase 40      Total Protein 5.9 (*)     AST 25      Bilirubin, Total 0.7      ALT 19      URINALYSIS WITH REFLEX CULTURE AND MICROSCOPIC - Abnormal    Color, Urine Yellow      Appearance, Urine Clear      Specific Gravity, Urine 1.025      pH, Urine 5.0      Protein, Urine 30 (1+) (*)     Glucose, Urine Normal      Blood, Urine NEGATIVE      Ketones, Urine NEGATIVE      Bilirubin, Urine NEGATIVE      Urobilinogen, Urine Normal      Nitrite, Urine NEGATIVE      Leukocyte Esterase, Urine NEGATIVE     URINALYSIS MICROSCOPIC WITH REFLEX CULTURE - Abnormal    WBC, Urine 1-5      RBC, Urine NONE      Hyaline Casts, Urine 1+ (*)    URINALYSIS WITH REFLEX CULTURE AND MICROSCOPIC    Narrative:     The following orders were created for panel order Urinalysis with Reflex Culture and Microscopic.  Procedure                               Abnormality         Status                     ---------                               -----------         ------                     Urinalysis with Reflex C...[[id number]]  Abnormal            Final result               Extra Urine Gray Tube[[id number]]                            In process                   Please view results for these tests on the individual orders.   EXTRA URINE GRAY TUBE   MORPHOLOGY    RBC Morphology See Below      Giant Platelets Few         All other labs were within normal range or not returned as of this dictation.    Imaging  CT abdomen pelvis wo IV contrast   Final Result   7 mm nonobstructing left lower pelvis renal calculus. No   hydronephrosis or hydroureter. The urinary bladder is decompressed   limiting evaluation.        Hyperdense renal cysts likely hemorrhagic or proteinaceous however   incompletely characterized on current exam.        Additional findings including coronary artery calcifications, small   hiatal hernia and diverticulosis.             MACRO:   None        Signed by: Gladis Carrasquillo 2/15/2025 10:44 PM   Dictation workstation:   [id number]           Procedures  Procedures     EMERGENCY DEPARTMENT COURSE/MDM:     Diagnoses as of  02/15/25 2322   Flank pain        Medical Decision Making  84-year-old man comes emergency with left flank pain, was seen here 4 days ago, had a nonobstructing 7 mm stone on the left, he said his pain was better for couple days but is getting progressively worse.  He does recall a possible injury lifting a [place] tree over December time.  Did get CBC, chemistry, CT abdomen pelvis without contrast here today.  He said he is seeing her urologist on Thursday.    On my independent review of labs, CBC shows normal white count, stable hemoglobin, chronically low platelets.  Chemistry shows slight hyponatremia, otherwise renal and hepatic function are within normal limits.  Urinalysis was unremarkable, CT abdomen pelvis shows no acute process, does show a nonobstructing 7 mm stone, kidney cyst.  I did inform the patient of these findings.  Patient was discharged with a prescription for oxycodone for breakthrough pain, continue taking Tylenol ibuprofen at home for pain, follow-up with his urologist scheduled on Thursday, family was at bedside, they are comfortable with this plan.        Patient and or family in agreement and understanding of treatment plan.  All questions answered.      I reviewed the case with the attending ED physician. The attending ED physician agrees with the plan. Patient and/or patient´s representative was counseled regarding labs, imaging, likely diagnosis, and plan. All questions were answered.    ED Medications administered this visit:    Medications   ondansetron (Zofran) injection 4 mg (4 mg intravenous Given 2/15/25 2209)   morphine injection 4 mg (4 mg intravenous Given 2/15/25 2209)       New Prescriptions from this visit:    New Prescriptions    OXYCODONE (ROXICODONE) 5 MG IMMEDIATE RELEASE TABLET    Take 1 tablet (5 mg) by mouth every 8 hours if needed for severe pain (7 - 10) for up to 3 days.       Follow-up:  Zainab Henning MD PhD  [address]  [facility], [place]  [address]  [place] [id number]  [phone]          Abelardo Yancey MD  [address] [facility] [address] [place] [address]  [place] [id number] [phone]              Final Impression:   1. Flank pain          (Please note that portions of this note were completed with a voice recognition program.  Efforts were made to edit the dictations but occasionally words are mis-transcribed.)     Jaquan Pandey DO  Resident  02/15/25 [id number]

## 2025-02-16 NOTE — DISCHARGE INSTRUCTIONS
Take 650 mg Tylenol, 400 mg ibuprofen every 4-6 hours for pain, take a 5 mg oxycodone every 8 hours for breakthrough pain only.  Follow-up with your urologist on Thursday.  Return the emergency room for any new, concerning worsening symptoms.

## 2025-02-17 ENCOUNTER — TELEMEDICINE (OUTPATIENT)
Dept: PRIMARY CARE | Facility: CLINIC | Age: 85
End: 2025-02-17
Payer: MEDICARE

## 2025-02-17 VITALS — BODY MASS INDEX: 32.27 KG/M2 | WEIGHT: 188 LBS

## 2025-02-17 DIAGNOSIS — M54.9 MID BACK PAIN ON LEFT SIDE: Primary | ICD-10-CM

## 2025-02-17 DIAGNOSIS — I10 BENIGN ESSENTIAL HYPERTENSION: ICD-10-CM

## 2025-02-17 DIAGNOSIS — E78.2 MIXED HYPERLIPIDEMIA: ICD-10-CM

## 2025-02-17 DIAGNOSIS — N18.31 CKD STAGE G3A/A1, GFR 45-59 AND ALBUMIN CREATININE RATIO <30 MG/G (MULTI): ICD-10-CM

## 2025-02-17 DIAGNOSIS — M05.79 RHEUMATOID ARTHRITIS INVOLVING MULTIPLE SITES WITH POSITIVE RHEUMATOID FACTOR (MULTI): ICD-10-CM

## 2025-02-17 DIAGNOSIS — I48.11 LONGSTANDING PERSISTENT ATRIAL FIBRILLATION (MULTI): ICD-10-CM

## 2025-02-17 ASSESSMENT — ENCOUNTER SYMPTOMS
NECK PAIN: 0
CONSTIPATION: 0
WHEEZING: 0
EYE REDNESS: 0
WOUND: 0
EYE DISCHARGE: 0
ADENOPATHY: 0
EYES NEGATIVE: 1
HEMATOLOGIC/LYMPHATIC NEGATIVE: 1
VOICE CHANGE: 0
POLYDIPSIA: 0
AGITATION: 0
STRIDOR: 0
TROUBLE SWALLOWING: 0
POLYPHAGIA: 0
NEUROLOGICAL NEGATIVE: 1
NERVOUS/ANXIOUS: 0
JOINT SWELLING: 0
PALPITATIONS: 0
PSYCHIATRIC NEGATIVE: 1
APPETITE CHANGE: 0
NUMBNESS: 0
SEIZURES: 0
NECK STIFFNESS: 0
FREQUENCY: 0
BLOOD IN STOOL: 0
DYSURIA: 0
SHORTNESS OF BREATH: 0
EYE PAIN: 0
SINUS PRESSURE: 0
BRUISES/BLEEDS EASILY: 0
DIARRHEA: 0
UNEXPECTED WEIGHT CHANGE: 0
DIFFICULTY URINATING: 0
HALLUCINATIONS: 0
COUGH: 0
FACIAL ASYMMETRY: 0
SLEEP DISTURBANCE: 0
GASTROINTESTINAL NEGATIVE: 1
NAUSEA: 0
FATIGUE: 1
ENDOCRINE NEGATIVE: 1
BACK PAIN: 1
WEAKNESS: 0
RESPIRATORY NEGATIVE: 1
SORE THROAT: 0
LIGHT-HEADEDNESS: 0
ACTIVITY CHANGE: 0
HEADACHES: 0
VOMITING: 0
CARDIOVASCULAR NEGATIVE: 1
CONFUSION: 0
FLANK PAIN: 1
TREMORS: 0
COLOR CHANGE: 0
ALLERGIC/IMMUNOLOGIC NEGATIVE: 1
SINUS PAIN: 0
CHEST TIGHTNESS: 0
ARTHRALGIAS: 1
ABDOMINAL PAIN: 0
MYALGIAS: 0
DIZZINESS: 0
SPEECH DIFFICULTY: 0

## 2025-02-17 NOTE — ASSESSMENT & PLAN NOTE
Clinically stable. F/up with cardiology. Remains on Eliquis.  
Clinically stable. F/up with rheumatology.  
Continue statin.   
Controlled. Continue current treatment.   
Stable. Will monitor.   
Orthopedic

## 2025-02-17 NOTE — PROGRESS NOTES
Subjective   Patient ID: Joce Galeana is a 84 y.o. male who presents for Virtual Visit (Virtual visit Left side pain kidney side at  [facility] [place]).    HPI     Virtual or Telephone Consent    An interactive audio and video telecommunication system which permits real time communications between the patient (at the originating site) and provider (at the distant site) was utilized to provide this telehealth service.   Verbal consent was requested and obtained from Joce Galeana on this date, 02/17/25 for a telehealth visit.     This is 83 yo male with very complex medical problems including HTN, HLD, A. Fib, nephrolithiasis, RA, sciatica, OA, SCC.     We reviewed and discussed his medical conditions during today's visit.      Patient has been feeling pretty good today and has been complaint with prescribed medications.      He went to [facility] ER twice last week with severe left flank pain, concerned about kidney stones.  Did not have urinary sx , no blood in the urine.  Had CT abd/pelvis done twice, no acute changes noted. 7 mm nonobstructive stone was seen in left kidney.  Additionally advanced degenerative changes of lumbar spine.     Advise pain medications: Tylenol, Ibuprofen, Oxycodone and f/up with urology as outpatient.  I strongly advised to avoid ibuprofen and other NSAID's due to current tx with Eliquis and risk of excessive bleeding.     I reviewed available hospital ER records and discharge orders. Discussed  patient's condition  in details. I reviewed discharge medications, reconciled discharge medications and compared with outpatient medication list. All medications changes were discussed with patient in details. Current medication list was updated to reflect recent changes.     He has been feeling better while taking pain medications around the clock, will see urologist in 3 days.  His symptoms are unlikely related to nephrolithiasis, will refer to PMR.     We reviewed and discussed details of  recent blood work: CBC, CMP, TSH, Lipid panel, Hb A1c, Vit D, PSA. Done in 2024 and 2025.  Results within acceptable range.        He was previously  following with urology Dr. Matthew regarding nephrolithiasis and kidneys cysts.   Due to Dr. Matthew leaving , she will see new urologist.     He was admitted to [facility] 3 times in 2024 due to hematuria and nephrolithiasis.  Underwent lithotripsy and ureteroscopic stone basket extraction.      He follows with  rheumatologist Dr. Riley, diagnosed with RA and PMR, advised prednisone in addition to Methotrexate.  He takes Tramadol as needed for pain control, prescribed by rheumatologist, no signs or symptoms of tramadol misuse or overuse.           Follows regularly with cardiologist Dr. Ibarra.  Patient was diagnosed with A. fib. in June 2017, saw his cardiologist Dr. Ibarra, underwent cardioversion which was successful. Patient was started on Eliquis, Metoprolol 75 mg bid in addition to his medications. Amlodipine was discontinued.     Patient underwent Mohs surgery twice in October 2024 for two different skin ca (SCC) on left side of neck, and right posterior neck. Surgical wounds have healed.    Remains  active around the house, and has been exercising few times per week at his home gym.     Review of Systems   Constitutional:  Positive for fatigue. Negative for activity change, appetite change and unexpected weight change.   HENT: Negative.  Negative for congestion, ear discharge, ear pain, hearing loss, mouth sores, nosebleeds, sinus pressure, sinus pain, sore throat, trouble swallowing and voice change.    Eyes: Negative.  Negative for pain, discharge, redness and visual disturbance.   Respiratory: Negative.  Negative for cough, chest tightness, shortness of breath, wheezing and stridor.    Cardiovascular: Negative.  Negative for chest pain, palpitations and leg swelling.   Gastrointestinal: Negative.  Negative for abdominal pain, blood in stool, constipation,  diarrhea, nausea and vomiting.   Endocrine: Negative.  Negative for polydipsia, polyphagia and polyuria.   Genitourinary:  Positive for flank pain. Negative for difficulty urinating, dysuria, frequency and urgency.   Musculoskeletal:  Positive for arthralgias and back pain. Negative for gait problem, joint swelling, myalgias, neck pain and neck stiffness.   Skin: Negative.  Negative for color change, rash and wound.   Allergic/Immunologic: Negative.  Negative for environmental allergies, food allergies and immunocompromised state.   Neurological: Negative.  Negative for dizziness, tremors, seizures, syncope, facial asymmetry, speech difficulty, weakness, light-headedness, numbness and headaches.   Hematological: Negative.  Negative for adenopathy. Does not bruise/bleed easily.   Psychiatric/Behavioral: Negative.  Negative for agitation, behavioral problems, confusion, hallucinations, sleep disturbance and suicidal ideas. The patient is not nervous/anxious.    All other systems reviewed and are negative.      Objective   Wt 85.3 kg (188 lb)   BMI 32.27 kg/m²     Physical Exam  Pulmonary:      Effort: Pulmonary effort is normal.   Neurological:      Mental Status: He is alert and oriented to person, place, and time.   Psychiatric:         Mood and Affect: Mood normal.         Thought Content: Thought content normal.         Judgment: Judgment normal.         Assessment/Plan   Problem List Items Addressed This Visit             ICD-10-CM       Cardiac and Vasculature    Atrial fibrillation (Multi) I48.91     Clinically stable. F/up with cardiology. Remains on Eliquis.         Benign essential hypertension I10     Controlled. Continue current treatment.          Hyperlipidemia E78.5     Continue statin.             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 Will monitor.             Multi-system (Lupus, Sarcoid)    Rheumatoid arthritis  M06.9     Clinically stable. F/up with rheumatology.         Relevant Orders    Referral to Physical Medicine Rehab       Musculoskeletal and Injuries    Mid back pain on left side - Primary M54.9    Relevant Orders    Referral to Physical Medicine Rehab   It was a pleasure to see you today.  I would like to remind you about importance of a healthy lifestyle in order to improve your well-being and live longer.  Try to engage in physical activities for at least 150 minutes per week.  Eat about 10 servings of fruits and vegetables daily. My advice is 2 servings of fruits and 8 servings of vegetables.  For vegetables choose at least half of them green and at least half of them fresh.  Please avoid sugar, salt, fried food and saturated fat.    I spent a total of 30 minutes on the date of service for follow up visit, which included preparing to see the patient, face-to-face patient care, completing clinical documentation, obtaining and/or reviewing separately obtained history, performing a medically appropriate examination, counseling and educating the patient/family/caregiver, ordering medications, tests, or procedures, communicating with other health care providers (not separately reported), independently interpreting results (not separately reported), communicating results to the patient/family/caregiver, and care coordination (not separately reported).      F/up in 1 month or sooner if needed.

## 2025-02-18 NOTE — PROGRESS NOTES
Subjective   Patient ID: Joce Galeana is a 84 y.o. male who presents for Virtual Visit (Virtual visit Left side pain kidney side at  [facility] [place]).    HPI     Virtual or Telephone Consent    An interactive audio and video telecommunication system which permits real time communications between the patient (at the originating site) and provider (at the distant site) was utilized to provide this telehealth service.   Verbal consent was requested and obtained from Joce Galeana on this date, 02/17/25 for a telehealth visit.     This is 85 yo male with very complex medical problems including HTN, HLD, A. Fib, nephrolithiasis, RA, sciatica, OA, SCC.     We reviewed and discussed his medical conditions during today's visit.      Patient has been feeling pretty good today and has been complaint with prescribed medications.      He went to [facility] ER twice last week with severe left flank pain, concerned about kidney stones.  Did not have urinary sx , no blood in the urine.  Had CT abd/pelvis done twice, no acute changes noted. 7 mm nonobstructive stone was seen in left kidney.  Additionally advanced degenerative changes of lumbar spine.     Advise pain medications: Tylenol, Ibuprofen, Oxycodone and f/up with urology as outpatient.  I strongly advised to avoid ibuprofen and other NSAID's due to current tx with Eliquis and risk of excessive bleeding.     I reviewed available hospital ER records and discharge orders. Discussed  patient's condition  in details. I reviewed discharge medications, reconciled discharge medications and compared with outpatient medication list. All medications changes were discussed with patient in details. Current medication list was updated to reflect recent changes.     He has been feeling better while taking pain medications around the clock, will see urologist in 3 days.  His symptoms are unlikely related to nephrolithiasis, will refer to PMR.     We reviewed and discussed details of  recent blood work: CBC, CMP, TSH, Lipid panel, Hb A1c, Vit D, PSA. Done in 2024 and 2025.  Results within acceptable range.        He was previously  following with urology Dr. Matthew regarding nephrolithiasis and kidneys cysts.   Due to Dr. Matthew leaving , she will see new urologist.     He was admitted to [facility] 3 times in 2024 due to hematuria and nephrolithiasis.  Underwent lithotripsy and ureteroscopic stone basket extraction.      He follows with  rheumatologist Dr. Riley, diagnosed with RA and PMR, advised prednisone in addition to Methotrexate.  He takes Tramadol as needed for pain control, prescribed by rheumatologist, no signs or symptoms of tramadol misuse or overuse.           Follows regularly with cardiologist Dr. Ibarra.  Patient was diagnosed with A. fib. in June 2017, saw his cardiologist Dr. Ibarra, underwent cardioversion which was successful. Patient was started on Eliquis, Metoprolol 75 mg bid in addition to his medications. Amlodipine was discontinued.     Patient underwent Mohs surgery twice in October 2024 for two different skin ca (SCC) on left side of neck, and right posterior neck. Surgical wounds have healed.    Remains  active around the house, and has been exercising few times per week at his home gym.     Review of Systems   Constitutional:  Positive for fatigue. Negative for activity change, appetite change and unexpected weight change.   HENT: Negative.  Negative for congestion, ear discharge, ear pain, hearing loss, mouth sores, nosebleeds, sinus pressure, sinus pain, sore throat, trouble swallowing and voice change.    Eyes: Negative.  Negative for pain, discharge, redness and visual disturbance.   Respiratory: Negative.  Negative for cough, chest tightness, shortness of breath, wheezing and stridor.    Cardiovascular: Negative.  Negative for chest pain, palpitations and leg swelling.   Gastrointestinal: Negative.  Negative for abdominal pain, blood in stool, constipation,  diarrhea, nausea and vomiting.   Endocrine: Negative.  Negative for polydipsia, polyphagia and polyuria.   Genitourinary:  Positive for flank pain. Negative for difficulty urinating, dysuria, frequency and urgency.   Musculoskeletal:  Positive for arthralgias and back pain. Negative for gait problem, joint swelling, myalgias, neck pain and neck stiffness.   Skin: Negative.  Negative for color change, rash and wound.   Allergic/Immunologic: Negative.  Negative for environmental allergies, food allergies and immunocompromised state.   Neurological: Negative.  Negative for dizziness, tremors, seizures, syncope, facial asymmetry, speech difficulty, weakness, light-headedness, numbness and headaches.   Hematological: Negative.  Negative for adenopathy. Does not bruise/bleed easily.   Psychiatric/Behavioral: Negative.  Negative for agitation, behavioral problems, confusion, hallucinations, sleep disturbance and suicidal ideas. The patient is not nervous/anxious.    All other systems reviewed and are negative.      Objective   Wt 85.3 kg (188 lb)   BMI 32.27 kg/m²     Physical Exam  Pulmonary:      Effort: Pulmonary effort is normal.   Neurological:      Mental Status: He is alert and oriented to person, place, and time.   Psychiatric:         Mood and Affect: Mood normal.         Thought Content: Thought content normal.         Judgment: Judgment normal.         Assessment/Plan   Problem List Items Addressed This Visit             ICD-10-CM       Cardiac and Vasculature    Atrial fibrillation (Multi) I48.91     Clinically stable. F/up with cardiology. Remains on Eliquis.         Benign essential hypertension I10     Controlled. Continue current treatment.          Hyperlipidemia E78.5     Continue statin.             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 Will monitor.             Multi-system (Lupus, Sarcoid)    Rheumatoid arthritis  M06.9     Clinically stable. F/up with rheumatology.         Relevant Orders    Referral to Physical Medicine Rehab       Musculoskeletal and Injuries    Mid back pain on left side - Primary M54.9    Relevant Orders    Referral to Physical Medicine Rehab   It was a pleasure to see you today.  I would like to remind you about importance of a healthy lifestyle in order to improve your well-being and live longer.  Try to engage in physical activities for at least 150 minutes per week.  Eat about 10 servings of fruits and vegetables daily. My advice is 2 servings of fruits and 8 servings of vegetables.  For vegetables choose at least half of them green and at least half of them fresh.  Please avoid sugar, salt, fried food and saturated fat.    I spent a total of 30 minutes on the date of service for follow up visit, which included preparing to see the patient, face-to-face patient care, completing clinical documentation, obtaining and/or reviewing separately obtained history, performing a medically appropriate examination, counseling and educating the patient/family/caregiver, ordering medications, tests, or procedures, communicating with other health care providers (not separately reported), independently interpreting results (not separately reported), communicating results to the patient/family/caregiver, and care coordination (not separately reported).      F/up in 1 month or sooner if needed.

## 2025-02-20 ENCOUNTER — APPOINTMENT (OUTPATIENT)
Facility: CLINIC | Age: 85
End: 2025-02-20
Payer: MEDICARE

## 2025-02-20 VITALS
HEIGHT: 63 IN | BODY MASS INDEX: 33.63 KG/M2 | WEIGHT: 189.8 LBS | HEART RATE: 85 BPM | SYSTOLIC BLOOD PRESSURE: 128 MMHG | DIASTOLIC BLOOD PRESSURE: 83 MMHG | TEMPERATURE: 96.9 F

## 2025-02-20 DIAGNOSIS — N20.0 KIDNEY STONE: Primary | ICD-10-CM

## 2025-02-20 LAB
POC APPEARANCE, URINE: CLEAR
POC BILIRUBIN, URINE: NEGATIVE
POC BLOOD, URINE: NEGATIVE
POC COLOR, URINE: YELLOW
POC GLUCOSE, URINE: NEGATIVE MG/DL
POC KETONES, URINE: NEGATIVE MG/DL
POC LEUKOCYTES, URINE: NEGATIVE
POC NITRITE,URINE: NEGATIVE
POC PH, URINE: 5 PH
POC PROTEIN, URINE: ABNORMAL MG/DL
POC SPECIFIC GRAVITY, URINE: 1.02
POC UROBILINOGEN, URINE: 0.2 EU/DL

## 2025-02-20 PROCEDURE — 3079F DIAST BP 80-89 MM HG: CPT | Performed by: STUDENT IN AN ORGANIZED HEALTH CARE EDUCATION/TRAINING PROGRAM

## 2025-02-20 PROCEDURE — 1123F ACP DISCUSS/DSCN MKR DOCD: CPT | Performed by: STUDENT IN AN ORGANIZED HEALTH CARE EDUCATION/TRAINING PROGRAM

## 2025-02-20 PROCEDURE — 1157F ADVNC CARE PLAN IN RCRD: CPT | Performed by: STUDENT IN AN ORGANIZED HEALTH CARE EDUCATION/TRAINING PROGRAM

## 2025-02-20 PROCEDURE — 3074F SYST BP LT 130 MM HG: CPT | Performed by: STUDENT IN AN ORGANIZED HEALTH CARE EDUCATION/TRAINING PROGRAM

## 2025-02-20 PROCEDURE — 99213 OFFICE O/P EST LOW 20 MIN: CPT | Performed by: STUDENT IN AN ORGANIZED HEALTH CARE EDUCATION/TRAINING PROGRAM

## 2025-02-20 PROCEDURE — 1160F RVW MEDS BY RX/DR IN RCRD: CPT | Performed by: STUDENT IN AN ORGANIZED HEALTH CARE EDUCATION/TRAINING PROGRAM

## 2025-02-20 PROCEDURE — 81003 URINALYSIS AUTO W/O SCOPE: CPT | Performed by: STUDENT IN AN ORGANIZED HEALTH CARE EDUCATION/TRAINING PROGRAM

## 2025-02-20 PROCEDURE — 1159F MED LIST DOCD IN RCRD: CPT | Performed by: STUDENT IN AN ORGANIZED HEALTH CARE EDUCATION/TRAINING PROGRAM

## 2025-02-20 NOTE — PROGRESS NOTES
Chief complaint:  Nephrolithiasis  Referring physician:  No ref. provider found     SUBJECTIVE:  HPI:  Joce Galeana is a 84 y.o. male with a history of HTN, HLD, Afib on Eliquis, CKD3a, uric acid nephrolithiasis who presents for follow up of nephrolithiasis.    Has followed with Dr. Matthew.  Had staged left ureteroscopy for very large (>2cm) proximal ureteral and renal stone burden.  Recently had CT that showed persistent 7mm LLP stone.  Not symptomatic at present.  Curious what should be done for these stones.  Stone analysis 100% uric acid.    Medical history:   has a past medical history of Arrhythmia, Atrial fibrillation (Multi), BPH (benign prostatic hyperplasia), Bradycardia, unspecified (02/08/2019), CKD (chronic kidney disease), stage III (Multi), Hyperlipidemia, Hypertension, Irregular heart beat, Long term current use of anticoagulant, Nephrolithiasis, Other conditions influencing health status, Personal history of other diseases of the circulatory system, Personal history of other diseases of the circulatory system, PMR (polymyalgia rheumatica) (Multi), and Rheumatoid arthritis, unspecified.   Surgical history:   has a past surgical history that includes Rotator cuff repair (12/20/2013); Colonoscopy (12/03/2014); Lithotripsy; Other surgical history; and Ureteral stent placement.  Family history:  family history includes Heart attack in his father; Heart disease in his mother; Hypertension in his father and mother; Skin cancer in his mother.  Social history:   reports that he has never smoked. He has never been exposed to tobacco smoke. He has never used smokeless tobacco. He reports that he does not currently use alcohol. He reports that he does not use drugs.    Medications:    Current Outpatient Medications   Medication Instructions    acetaminophen (TYLENOL EXTRA STRENGTH) 1,000 mg, 2 times daily    albuterol 2.5 mg    atorvastatin (LIPITOR) 20 mg, oral, Daily    diclofenac sodium (VOLTAREN) 4 g,  "2 times daily PRN    Eliquis 5 mg, 2 times daily    enalapril (VASOTEC) 10 mg, oral, Daily    folic acid (FOLVITE) 1 mg, Daily    hydrALAZINE (APRESOLINE) 5 mg    latanoprost (Xalatan) 0.005 % ophthalmic solution 1 drop, Nightly    meclizine (Antivert) 25 mg tablet Take 1 tablet (25 mg) by mouth 2 times a day as needed for dizziness.    menthol (Biofreeze, menthol,) 4 % gel gel 1 Application, 2 times daily PRN    methotrexate (Trexall) 2.5 mg tablet 6 tablets, Once Weekly    metoprolol succinate XL (TOPROL-XL) 75 mg, 2 times daily    predniSONE (Deltasone) 1 mg tablet Take 4 tablets (4 mg) by mouth once daily.    sodium chloride 0.9% 100 mL/hr    tamsulosin (FLOMAX) 0.4 mg, oral, 2 times daily    traMADol (ULTRAM) 50 mg, 2 times daily PRN    UNABLE TO FIND 1 Application, 2 times daily PRN    wheat dextrin (Benefiber Clear SF, dextrin,) 3 gram/3.5 gram powder in packet 2 Scoops, Daily      Allergies:    No Known Allergies     ROS:  14-point review of systems negative except as noted above.    OBJECTIVE:  Visit Vitals  /83   Pulse 85   Temp 36.1 °C (96.9 °F)   Body mass index is 33.62 kg/m².    Physical exam  General:  No acute distress  HEENT:  EOMI  CV:  Regular rate  Pulm:  Nonlabored respirations  Abd:  Soft, non-distended  :  No suprapubic or CVA tenderness  MSK:  No contractures  Neuro:  Motor intact  Psych:  Appropriate affect    Labs:    Lab Results   Component Value Date    WBC 6.2 02/15/2025    HGB 13.0 (L) 02/15/2025    HCT 39.3 (L) 02/15/2025     (L) 02/15/2025    ALT 19 02/15/2025    AST 25 02/15/2025     (L) 02/15/2025    K 4.7 02/15/2025     02/15/2025    CREATININE 1.25 02/15/2025    BUN 25 (H) 02/15/2025    CO2 19 (L) 02/15/2025    INR 1.2 (H) 02/21/2024     Urine Culture (no units)   Date Value   03/27/2024 No growth    No results found for: \"PSA\"    Imaging:  All imaging discussed in HPI was independently reviewed.    ASSESSMENT:  Uric acid nephrolithiasis    Discussed " management of stones including observation or elective surgery for most nonobstructing stones.  In this case since uric acid can potentially decrease size of stones with urinary alkalinization.  Patient will move forward with alkalinization and observation with repeat CT in 6 months for surveillance of stone improvement or progression.    PLAN:  1/4 tsp baking soda BID indefinitely  CT before follow up in 6 months  If stones growing consider 24h urine and rx K citrate    Follow-up 6 months    Abelardo Yancey MD    Problem List Items Addressed This Visit       Kidney stone    Relevant Orders    POCT UA Automated manually resulted (Completed)    CT abdomen pelvis wo IV contrast

## 2025-02-25 DIAGNOSIS — I10 BENIGN ESSENTIAL HYPERTENSION: ICD-10-CM

## 2025-02-25 RX ORDER — ENALAPRIL MALEATE 10 MG/1
10 TABLET ORAL DAILY
Qty: 90 TABLET | Refills: 3 | Status: SHIPPED | OUTPATIENT
Start: 2025-02-25

## 2025-02-26 ENCOUNTER — HOSPITAL ENCOUNTER (OUTPATIENT)
Dept: RADIOLOGY | Facility: CLINIC | Age: 85
Discharge: HOME | End: 2025-02-26
Payer: MEDICARE

## 2025-02-26 ENCOUNTER — CONSULT (OUTPATIENT)
Dept: ORTHOPEDIC SURGERY | Facility: CLINIC | Age: 85
End: 2025-02-26
Payer: MEDICARE

## 2025-02-26 DIAGNOSIS — M47.814 THORACIC SPONDYLOSIS: Primary | ICD-10-CM

## 2025-02-26 DIAGNOSIS — M47.816 LUMBAR SPONDYLOSIS: ICD-10-CM

## 2025-02-26 DIAGNOSIS — M05.79 RHEUMATOID ARTHRITIS INVOLVING MULTIPLE SITES WITH POSITIVE RHEUMATOID FACTOR (MULTI): ICD-10-CM

## 2025-02-26 DIAGNOSIS — M54.16 LUMBAR RADICULITIS: ICD-10-CM

## 2025-02-26 DIAGNOSIS — M48.062 LUMBAR STENOSIS WITH NEUROGENIC CLAUDICATION: ICD-10-CM

## 2025-02-26 DIAGNOSIS — M47.814 THORACIC SPONDYLOSIS: ICD-10-CM

## 2025-02-26 DIAGNOSIS — M54.9 MID BACK PAIN ON LEFT SIDE: ICD-10-CM

## 2025-02-26 PROCEDURE — 72120 X-RAY BEND ONLY L-S SPINE: CPT

## 2025-02-26 PROCEDURE — 72070 X-RAY EXAM THORAC SPINE 2VWS: CPT

## 2025-02-26 PROCEDURE — 99214 OFFICE O/P EST MOD 30 MIN: CPT | Performed by: PHYSICAL MEDICINE & REHABILITATION

## 2025-02-26 PROCEDURE — 99204 OFFICE O/P NEW MOD 45 MIN: CPT | Performed by: PHYSICAL MEDICINE & REHABILITATION

## 2025-02-26 SDOH — SOCIAL STABILITY: SOCIAL NETWORK: SOCIAL ACTIVITY:: 3

## 2025-02-26 NOTE — PROGRESS NOTES
New Consult/New Patient Note    2/26/2025   Milady Zainab AMNA, M*    Assessment: Very pleasant 84-year-old male with acute on chronic primarily left thoracolumbar pain.  History of kidney stones.  Recently evaluated in the ER with no obvious obstruction but he does have a renal calculi.  Pain and symptoms are severe and impacting his daily activity  -Eliquis for A-fib  -Noted moderate to severe central canal stenosis at L3-4 and to a greater extent at L4-5 on abdominal CT scan.  He does endorse intermittent radicular and/or neurogenic claudication pain  -Moderate to severe facet arthropathy    PLAN:  1)  Imaging/Diagnostic Studies: Extensive reviewed and interpreted most recent abdominal CT scan with focus on the lumbar spine.  Moderate to severe central canal stenosis is appreciated along with severe facet arthropathy.  Will obtain updated x-rays of the lumbar and thoracic spine  2)  Therapy/Rehabilitation: New consult provided for physical therapy  3)  Pharmacological Management: Discussed potential gabapentin or Lyrica-he deferred at this time  4)  Spine/Surgical Interventions: None at this time  5)  Alternative Treatments: May consider alternative treatment options in the future including manipulation (chiropractor versus osteopathic) and/or acupuncture if patient does not obtain optimal relief with initial treatment plan.  6)  Consultations: Physical therapy  7)  Follow -up: 4-6 weeks or PRN if symptoms worsen/do not improve.   8)  Future treatment considerations: Lumbar and/or thoracic MRI to further assess.  Trial of gabapentin or Lyrica.  May benefit from lumbar NGUYỄN.  Understands to return to the emergency department or follow-up with his nephrology team if severe visceral kidney stone pain occurs    Patient advised of the difference between hurt and harm and advised to continue with all normal activities and exercises. Patient verbalized understanding of the above plan and was happy with the care  provided.      The above clinical summary has been dictated with voice recognition software. It has not been proofread for grammatical errors, typographical mistakes, or other semantic inconsistencies.    Thank you for visiting our office today. It was our pleasure to take part in your healthcare.     Do not hesitate to call with any questions regarding your plan of care after leaving at [phone]    To clinicians, thank you very much for this kind referral. It is a privilege to partner with you in the care of your patients. My office would be delighted to assist you with any further consultations or with questions regarding the plan of care outlined. Do not hesitate to call the office or contact me directly.     Sincerely,    PIOTR Mckenzie MD  , Physical Medicine and Rehabilitation, Orthopedic Spine  [facility] [employer]  [facility] [place]         Joce Galeana   is a 84 y.o. male who presents with  left side mid thoracic pain for 2 weeks  Location: left side mid to lower thoracic.    Radiation: no sig. Radiation   Quality: sharp, achy   current 3/10,  at its worst  5/10  Exacerbated by in and out of bed,  sitting to standing.  Relieved by rest  Onset, traumatic event: no  Has tried:  morphine, tramadol     Valsalva sign is [ n]   Grocery cart sign is [ n]   Smoker:  [n ]   Does not wake them at night   Litigation: [n ]     Patient denies bowel/bladder incontinence, denies fever, denies unintentional weight loss, denies clumsiness of hands, feet, or dropping things.  Denies any constitutional or myelopathic symptomatology.      PREVIOUS TREATMENTS  IN THE LAST SIX MONTHS     Active conservative therapy  in the last six months (see below)              1. Physical therapy: no                                                                                    2. Home exercise program after PT:                                                       3. A physician supervised home exercise program (HEP):                 4. Chiropractic Care:  no                                                                    Passive conservative therapy  in the last six months (see below)              1. NSAIDS:  avoiding , using Tylenol                                                                                                     2. Prescription pain medication:  Tramadol                                                            3. Acupuncture:                                                                                             4. Tens unit:      Assistive Devices:     Work status: retired       ROS: Other than listed in HPI, PMHX below, and intake paperwork including a 30 point patient-recorded review of symptoms which was personally reviewed and inclusive of no history of unintentional weight loss, change in appetite, significant malaise, fevers, chills, or change in bowel/bladder, shortness of breath, or chest pain.    I have confirmed and edited as necessary Past Medical, Past Surgical, Family, Social History and ROS as obtained by others. These were also obtained on new patient forms.      PHYSICAL EXAM:   GENERAL APPEARANCE:  Well nourished, well developed, and no apparent distress.  NEURO PSYCH: Patient oriented to person, place, Mood pleasant. Benign affect.  MUSCULOSKELETAL and NEUROLOGICAL       VISUAL INSPECTION          THORACIC: WNL           LUMBAR: WNL  SPINE ROM:   LUMBAR ROM: Mildly limited flexion as well as extension      PALPATION:           SPINOUS PROCESS: Nontender midline lumbar           PARASPINALS: Tenderness throughout the thoracolumbar area as well as lower lumbar on the left  FACET LOADING: Positive left lower lumbar  MUSCLE BULK: Normal and symmetrical in the upper & lower extremities.  MUSCLE TONE: Normal  MOTOR: 5/5 in all muscle groups tested in bilateral lower extremities   SENSORY: Normal sensory exam to light touch in the  lower extremities  GAIT: Normal.  Able to go up and heels and toes with no sig. weakness.  No sig. balance deficit appreciated  Slump testing: Positive on the left  PERIPHERAL JOINT ROM:   HIP ROM: Full bilaterally  CARLOS/Thigh Thrust/Compression/Austin Finger:  Negative bilaterally   Hip Exam including thigh thrust and LOG ROLL: Negative bilaterally    DATA REVIEW:   The below imaging studies were personally reviewed and discussed with the patient.    Medical Decision Making:  The above note constitutes a Moderate to High level of medical decision making based on past data and imaging review, new and chronic symptoms with exacerbation, change in weakness or sensation, new imaging and diagnostic studies ordered, discussion of potential interventional or surgical treatment options, acute or chronic pain that may pose a threat to bodily function.    Past Medical History:   Diagnosis Date    Arrhythmia     afib    Atrial fibrillation (Multi)     BPH (benign prostatic hyperplasia)     Bradycardia, unspecified 02/08/2019    Persistent sinus bradycardia    CKD (chronic kidney disease), stage III (Multi)     Hyperlipidemia     Hypertension     Irregular heart beat     Long term current use of anticoagulant     Nephrolithiasis     Other conditions influencing health status     Skin Cancer    Personal history of other diseases of the circulatory system     History of hypertension    Personal history of other diseases of the circulatory system     History of cardiac disorder    PMR (polymyalgia rheumatica) (Multi)     Rheumatoid arthritis, unspecified     Rheumatoid arthritis       Medication Documentation Review Audit       Reviewed by Abelardo Yancey MD (Physician) on 02/20/25 at 0924      Medication Order Taking? Sig Documenting Provider Last Dose Status   0.9 % sodium chloride (sodium chloride 0.9%) solution [id number] No Infuse 100 mL/hr at 100 mL/hr into a venous catheter.   Patient not taking: Reported on 2/20/2025     Luis Pineda MD Not Taking Active   acetaminophen (Tylenol Extra Strength) 500 mg tablet [id number] Yes Take 2 tablets (1,000 mg) by mouth 2 times a day. Morning and evening Luis Pineda MD Taking Active   albuterol 2.5 mg /3 mL (0.083 %) nebulizer solution [id number] Yes Inhale 3 mL (2.5 mg). Luis Pineda MD Taking Active   atorvastatin (Lipitor) 20 mg tablet [id number] Yes TAKE 1 TABLET BY MOUTH ONCE  DAILY Zainab Henning MD PhD  Active   diclofenac sodium (Voltaren) 1 % gel [id number] Yes Apply 4.5 inches (4 g) topically 2 times a day as needed (pain). Luis Pineda MD Taking Active   Eliquis 5 mg tablet [id number] Yes Take 1 tablet (5 mg) by mouth 2 times a day. Luis Pineda MD Taking Active   enalapril (Vasotec) 10 mg tablet [id number] Yes TAKE 1 TABLET BY MOUTH DAILY Zainab Henning MD PhD Taking Active   folic acid (Folvite) 1 mg tablet [id number] Yes Take 1 tablet (1 mg) by mouth once daily. Luis Pineda MD Taking Active   hydrALAZINE (Apresoline) 20 mg/mL injection [id number] No Infuse 0.25 mL (5 mg) into a venous catheter.   Patient not taking: Reported on 2/20/2025    Luis Pineda MD Taking Active   latanoprost (Xalatan) 0.005 % ophthalmic solution [id number] Yes Administer 1 drop into both eyes once daily at bedtime. Luis Pineda MD Taking Active   meclizine (Antivert) 25 mg tablet [id number] Yes Take 1 tablet (25 mg) by mouth 2 times a day as needed for dizziness. Luis Pineda MD Taking Active   menthol (Biofreeze, menthol,) 4 % gel gel [id number] Yes Apply 1 Application topically 2 times a day as needed (pain). Luis Pineda MD Taking Active   methotrexate (Trexall) 2.5 mg tablet [id number] Yes Take 6 tablets (15 mg total) by mouth 1 (one) time per week.  Sunday Lius Pineda MD Taking Active   metoprolol succinate XL (Toprol-XL) 25 mg 24 hr tablet [id number] Yes Take 3 tablets (75 mg) by mouth 2 times a day. Historical  Provider, MD Taking Active     Discontinued 02/15/25 2343   oxyCODONE (Roxicodone) 5 mg immediate release tablet [id number]  Take 1 tablet (5 mg) by mouth every 8 hours if needed for severe pain (7 - 10) for up to 3 days. Jaquan Pandey DO   25 2352   predniSONE (Deltasone) 1 mg tablet [id number] Yes Take 4 tablets (4 mg) by mouth once daily.   Patient taking differently: Take 7 tablets (7 mg) by mouth once daily.    Historical Provider, MD Taking Active   tamsulosin (Flomax) 0.4 mg 24 hr capsule [id number] Yes Take 1 capsule (0.4 mg) by mouth 2 times a day. Zainab Henning MD PhD  Active   traMADol (Ultram) 50 mg tablet [id number] Yes Take 1 tablet (50 mg) by mouth 2 times a day as needed. Historical Provider, MD Taking Active   UNABLE TO FIND [id number] Yes Apply 1 Application topically 2 times a day as needed (pain). Med Name: Blue Emu gel Historical Provider, MD Taking Active   wheat dextrin (Benefiber Clear SF, dextrin,) 3 gram/3.5 gram powder in packet [id number] Yes Take 2 Scoops by mouth once daily. Historical Provider, MD Taking Active                    No Known Allergies    Social History     Socioeconomic History    Marital status:      Spouse name: Not on file    Number of children: Not on file    Years of education: Not on file    Highest education level: Not on file   Occupational History    Not on file   Tobacco Use    Smoking status: Never     Passive exposure: Never    Smokeless tobacco: Never   Vaping Use    Vaping status: Never Used   Substance and Sexual Activity    Alcohol use: Not Currently    Drug use: Never    Sexual activity: Defer   Other Topics Concern    Not on file   Social History Narrative    Not on file     Social Drivers of Health     Financial Resource Strain: Low Risk  (2024)    Overall Financial Resource Strain (CARDIA)     Difficulty of Paying Living Expenses: Not hard at all   Food Insecurity: Not on file   Transportation Needs: No Transportation Needs  (2/21/2024)    PRAPARE - Transportation     Lack of Transportation (Medical): No     Lack of Transportation (Non-Medical): No   Physical Activity: Not on file   Stress: Not on file   Social Connections: Not on file   Intimate Partner Violence: Not on file   Housing Stability: Low Risk  (2/21/2024)    Housing Stability Vital Sign     Unable to Pay for Housing in the Last Year: No     Number of Places Lived in the Last Year: 1     Unstable Housing in the Last Year: No       Past Surgical History:   Procedure Laterality Date    COLONOSCOPY  12/03/2014    Complete Colonoscopy    LITHOTRIPSY      OTHER SURGICAL HISTORY      MOHS    ROTATOR CUFF REPAIR  12/20/2013    Rotator Cuff Repair    URETERAL STENT PLACEMENT

## 2025-03-02 LAB
Q ONSET: 217 MS
QRS COUNT: 12 BEATS
QRS DURATION: 80 MS
QT INTERVAL: 404 MS
QTC CALCULATION(BAZETT): 439 MS
QTC FREDERICIA: 427 MS
R AXIS: -34 DEGREES
T AXIS: -26 DEGREES
T OFFSET: 419 MS
VENTRICULAR RATE: 71 BPM

## 2025-03-12 ENCOUNTER — EVALUATION (OUTPATIENT)
Dept: PHYSICAL THERAPY | Facility: CLINIC | Age: 85
End: 2025-03-12
Payer: MEDICARE

## 2025-03-12 DIAGNOSIS — M48.062 LUMBAR STENOSIS WITH NEUROGENIC CLAUDICATION: ICD-10-CM

## 2025-03-12 DIAGNOSIS — M47.816 LUMBAR SPONDYLOSIS: ICD-10-CM

## 2025-03-12 DIAGNOSIS — M47.814 THORACIC SPONDYLOSIS: ICD-10-CM

## 2025-03-12 DIAGNOSIS — M54.16 LUMBAR RADICULITIS: ICD-10-CM

## 2025-03-12 PROCEDURE — 97110 THERAPEUTIC EXERCISES: CPT | Mod: GP

## 2025-03-12 PROCEDURE — 97161 PT EVAL LOW COMPLEX 20 MIN: CPT | Mod: GP

## 2025-03-12 ASSESSMENT — ENCOUNTER SYMPTOMS
DEPRESSION: 0
LOSS OF SENSATION IN FEET: 0
OCCASIONAL FEELINGS OF UNSTEADINESS: 0

## 2025-03-12 ASSESSMENT — PATIENT HEALTH QUESTIONNAIRE - PHQ9
2. FEELING DOWN, DEPRESSED OR HOPELESS: NOT AT ALL
1. LITTLE INTEREST OR PLEASURE IN DOING THINGS: NOT AT ALL
SUM OF ALL RESPONSES TO PHQ9 QUESTIONS 1 AND 2: 0

## 2025-03-12 NOTE — PROGRESS NOTES
Patient Name: Joce Galeana  MRN: [id number]  Time Calculation  Start Time: 1219  Stop Time: 1300  Time Calculation (min): 41 min  PT Evaluation Time Entry  PT Evaluation (Low) Time Entry: 28  PT Therapeutic Procedures Time Entry  Therapeutic Exercise Time Entry: 13                   Current Problem  1. Thoracic spondylosis  Referral to Physical Therapy      2. Lumbar spondylosis  Referral to Physical Therapy    Follow Up In Physical Therapy      3. Lumbar radiculitis  Referral to Physical Therapy      4. Lumbar stenosis with neurogenic claudication  Referral to Physical Therapy        Insurance    MEDICARE/ AARP 2410 ($0 USED) COPAY 0 (0)  COVERAGE 80/100 OOP 0 AARP TO FOLLOW MEDICARE  NO AUTH REQ [id number]/ALL       Subjective     General: Pt is a 84 y.o. male presenting to clinic with c/o L sided flank/LBP. Insidious onset of about 2 months ago. Pt has notable hx of hematuria last year in march, was discovered to have a kidney stone causing obstruction. Had to have multiple procedures to take out the stone. Had a lot of residual soreness around the area. Around December of last year pt began to have significant pain around the same area, pt had concern again for another kidney stone. It was discovered that there was indeed a stone, but not considered to be significant and there was uncertainty that this was pt's pain generator. At this point he feels his pain has decreased some and is more tolerable. Generally feels sharp. He tries to stay active generally, liked to swim in his past. Also rides a recumbent bike. Main activity restriction is prolonged walking, squatting, and twisting/side bending. Main goal with therapy at this time is to reduce pain and improve tolerance to activity      Pertinent medical hx:  RA, A-fib, HTN, long term elequis use, CKD, on prednisone chronically         Precautions:    Pain:  Current:  4/10  High: 7-8/10      Reviewed medical screening form with pt and medical screening  "assessed    Imaging:   Lumbar XR 25:     \"FINDINGS:  Lumbar spine, four views      There is moderate to severe multilevel disc space narrowing  osteophytosis throughout the lumbar spine worse at the lower levels.  There is severe facet disease at L4-5 and L5-S1. There is mild  anterolisthesis L4 on L5. No fracture seen      IMPRESSION:  Moderate severe multilevel spondylosis severe facet disease worst at  the lower levels of the lumbar spine. Mild anterolisthesis L4 on L5\"    Objective     Spine Musculoskeletal Exam    Range of Motion    Thoracolumbar       Flexion comment: 80. Flexion detail: guarding.     Extension comment: 30. Extension detail: pain.       Right      Lateral bending comment: 10.       Lateral rotation: 50%. Lateral rotation comment: p! on L side. Lateral rotation detail: pain.       Left      Lateral bending comment: 12. Lateral bending detail: pain.       Lateral rotation: 50%.      Strength    Thoracolumbar       Right      Tibialis anterior: 5/5.       Plantar flexion: 5/5.       Quadriceps: 5/5.       Hamstrin/5.       Hip abductors: 4+/5. Hip abductors are affected by pain.       Hip flexion: 4+/5.       Hip adduction: 4+/5.        Left      Tibialis anterior: 5/5.       Plantar flexion: 5/5.       Quadriceps: 5/5.       Hamstrin+/5.       Hip abductors: 4+/5. Hip abductors are affected by pain.       Hip flexion: 4/5. Hip flexion is affected by pain.       Hip adduction: 4+/5.      Thoracolumbar strength additional comments: Abd/add in hooklying      Hip Musculoskeletal Exam    Strength    Right      Flexion: 4+/5.       Internal rotation: 4+/5. Internal rotation is affected by pain.       External rotation: 4+/5.       Adduction: 4+/5.       Abduction is affected by pain.     Left      Flexion: 4/5. Flexion is affected by pain.       Internal rotation: 4/5.       External rotation: 4+/5.       Adduction: 4+/5.       Abduction is affected by pain.      Slump test: (-) sola "     Outcome Measures:  CORI:  16     Treatment: See HEP below    EDUCATION/HEP:  Pt educated on theorized etiology of symptoms, POC, and overall progression of treatment. Pt in agreement.     Discussed possibility of aquatic therapy if pt does not tolerate land based activity well.     Access Code: [email or web address]  URL: [email or web address]  Date: 03/12/2025  Prepared by: Elver Lux    Exercises  - Supine Lower Trunk Rotation  - 2-3 x daily - 7 x weekly - 1 sets - 15-20 reps  - Clamshell  - 1 x daily - 7 x weekly - 2-3 sets - 10 reps  - Hooklying Clamshell with Resistance  - 1 x daily - 7 x weekly - 2 sets - 10 reps - 5 hold  - Supine Bridge  - 1 x daily - 7 x weekly - 2-3 sets - 10 reps      Assessment:     Pt is a 84 y.o.  male presenting with c/o insidious onset L sided flank/LBP. Pt with known hx of kidney stones on this side with recent confirmation on CT. On exam demo's the following deficits:  decreased ad painful lumbar sidebending, pain with thoracolumbar rotation, weakness of hip musculature, and tenderness to palpation of the L flank/QL. These deficits have lead to functional impairments with  prolonged standing/walking, home and yard care, and participation in leisure activities. Unable to rule out possibility that hx of renal caliculi are contributing to his current discomfort, will progress with movement as tolerated. Recommend skilled PT to address the aforementioned deficits and allow pt to restore PLOF and maximize functional capacity. Anticipate fair prognosis given known comorbidities, pt's age. Positive contributing factors include pt's positive attitude towards therapy and support system.       Clinical Presentation: Stable     Level of Complexity: Low     Goals:      Pt to be IND with HEP  2. Pt lumbar SB to 20 degrees or greater for improved ability to squat/ objects from the ground   3. Pt hip abd MMT to 4+/5 sola in order to demonstrate improved lumbopelvic stability    4. Pt to be able to perform all home/yard care activity with LBP at less than or equal to 2/10   5. Pt to self report CORI score of less than or equal to 22% to demonstrate statistically significant improvement in function.     Plan  1x/week for 5 visits     Lumbar ROM, abdominal strengthening, hip strengthening   Planned interventions include:  aquatic therapy, biofeedback, cryotherapy, dry needling, edema control, education/instruction, electrical stimulation, gait training, home program, hot pack, kinesiotaping, manual therapy, neuromuscular re-education, self care/home management, therapeutic activities, therapeutic exercises, ultrasound and vasopneumatic device w/ cold.

## 2025-03-25 ENCOUNTER — APPOINTMENT (OUTPATIENT)
Dept: UROLOGY | Facility: CLINIC | Age: 85
End: 2025-03-25
Payer: MEDICARE

## 2025-03-26 ENCOUNTER — TREATMENT (OUTPATIENT)
Dept: PHYSICAL THERAPY | Facility: CLINIC | Age: 85
End: 2025-03-26
Payer: MEDICARE

## 2025-03-26 DIAGNOSIS — M47.816 LUMBAR SPONDYLOSIS: ICD-10-CM

## 2025-03-26 PROCEDURE — 97110 THERAPEUTIC EXERCISES: CPT | Mod: GP

## 2025-03-26 NOTE — PROGRESS NOTES
"Patient Name: Joce Galeana  MRN: [id number]  Time Calculation  Start Time: 1000  Stop Time: 1040  Time Calculation (min): 40 min     PT Therapeutic Procedures Time Entry  Therapeutic Exercise Time Entry: 40                     Current Problem  1. Lumbar spondylosis  Follow Up In Physical Therapy          Insurance  MEDICARE/ AARP 2410 ($0 USED) COPAY 0 (0)  COVERAGE 80/100 OOP 0 AARP TO FOLLOW MEDICARE  NO AUTH REQ [id number]/ALL       Follow up visit 1   Subjective     General  Pt reports his back is feeling a bit better since initial evaluation. Still notes some movements and activities like picking up objects from the floor can aggravate pain in the hips.   Precautions    Pain  1-2/10 in R hip     Objective     Treatments:    ANDREAS-5'   Supine LTRs : x15 sola   SL clam shell:  2x15 sola GTB   SL SLR:  2x10 sola   // Lateral step ups: 2x10 sola   //8\" step taps:  2x10 sola   // hip abd step out:  2x15 RTB   // monster walks:  RTB, x15 each     OP EDUCATION/HEP:    Mod cues for proper form during straight leg raises, inconsistent carryover     Access Code: [email or web address]  URL: [email or web address]  Date: 03/26/2025  Prepared by: Elver Lux    Exercises  - Side Stepping with Resistance at Ankles and Counter Support  - 1 x daily - 5 x weekly - 2 sets - 15 reps    Assessment   Good tolerance to session today. Pt reporting decreased pain in the L flank, slightly increased in the R hip s/t slight aggravation while cleaning his garage. Able to progress activities with SL hip abduction and TB step outs/monster walks. Utilized step ups  and step taps to promote SL balance and improved hip stability in stance. Added TB side stepping to HEP to promote goals of today's session outside of therapy. Recommend continued skilled PT to progress strength and reduce pain in order to restore prior level of function and mobility.     Plan     Continue per POC. Progress standing activities as tolerated.    "

## 2025-04-02 ENCOUNTER — APPOINTMENT (OUTPATIENT)
Dept: PHYSICAL THERAPY | Facility: CLINIC | Age: 85
End: 2025-04-02
Payer: MEDICARE

## 2025-04-03 ENCOUNTER — APPOINTMENT (OUTPATIENT)
Dept: PHYSICAL THERAPY | Facility: CLINIC | Age: 85
End: 2025-04-03
Payer: MEDICARE

## 2025-04-09 ENCOUNTER — TREATMENT (OUTPATIENT)
Dept: PHYSICAL THERAPY | Facility: CLINIC | Age: 85
End: 2025-04-09
Payer: MEDICARE

## 2025-04-09 DIAGNOSIS — M47.814 THORACIC SPONDYLOSIS: ICD-10-CM

## 2025-04-09 DIAGNOSIS — M47.816 LUMBAR SPONDYLOSIS: Primary | ICD-10-CM

## 2025-04-09 PROCEDURE — 97110 THERAPEUTIC EXERCISES: CPT | Mod: GP

## 2025-04-09 NOTE — PROGRESS NOTES
"Patient Name: Joce Galeana  MRN: [id number]  Time Calculation  Start Time: 1402  Stop Time: 1425  Time Calculation (min): 23 min     PT Therapeutic Procedures Time Entry  Therapeutic Exercise Time Entry: 23                     Current Problem  1. Lumbar spondylosis        2. Thoracic spondylosis            Insurance  MEDICARE/ AARP 2410 ($0 USED) COPAY 0 (0)  COVERAGE 80/100 OOP 0 AARP TO FOLLOW MEDICARE  NO AUTH REQ [id number]       Follow up visit 2   Subjective     General  Pt reports his back continues to do well. Has not had any symptom flare ups recently and feels ready to transition to his independent HEP.   Precautions    Pain  5/10, pt reports this is his \"typical arthritis\", not related to previous pain.     Objective   CORI:  11    Spine Musculoskeletal Exam    Range of Motion    Thoracolumbar       Flexion comment: 80 FF.     Extension comment: 28.       Right      Lateral bending comment: 20.       Left      Lateral bending comment: 15.      Strength    Thoracolumbar       Right      Hip flexion: 4+/5.        Left      Hip flexion: 5/5.      Hip Musculoskeletal Exam    Strength    Right      Flexion: 4+/5.       Internal rotation: 5/5.       External rotation: 5/5.       Abduction: 4/5.     Left      Flexion: 5/5.       Internal rotation: 5/5.       External rotation: 5/5.       Abduction: 4+/5.        Treatments:    ANDREAS-5'   Goal assessment/re-check:  13'  HEP review:  5'        OP EDUCATION/HEP:  Discussed progress towards goals, continuation of care. Pt in agreement to be placed on 30 day hold to be IND with HEP     Advised pt to maintain overall activity levels, stay consistent with provided HEP to continue to maintain improvements.   Assessment   Pt seen for RPT. At this point pt has seen near full resolution in familiar symptoms. Noted improvement on CORI score from initial evaluation. Good strength noted via MMT with increased ROM seen in to lumbar sidebending. Overall he feels he has " returned to near full PLOF and states readiness to be discharged to be IND with HEP. Discussed that we will place him on hold in case there is return of his familiar symptoms. He follows up with the referring provider in the coming weeks. Will have him on 30 day hold through that time, plan to discharge if no contact is made in this period.       Pt to be IND with HEP(MET, progressive)   2. Pt lumbar SB to 20 degrees or greater for improved ability to squat/ objects from the ground (partially met, progressing)   3. Pt hip abd MMT to 4+/5 sola in order to demonstrate improved lumbopelvic stability (MET)   4. Pt to be able to perform all home/yard care activity with LBP at less than or equal to 2/10 (Limited progress)   5. Pt to self report CORI score of less than or equal to 22% to demonstrate statistically significant improvement in function.  MET)   Plan     Pt on 30 day hold, will discharge if no contact in that time

## 2025-04-21 ENCOUNTER — OFFICE VISIT (OUTPATIENT)
Dept: PRIMARY CARE | Facility: CLINIC | Age: 85
End: 2025-04-21
Payer: MEDICARE

## 2025-04-21 VITALS
TEMPERATURE: 98 F | OXYGEN SATURATION: 100 % | WEIGHT: 193 LBS | DIASTOLIC BLOOD PRESSURE: 60 MMHG | HEIGHT: 63 IN | SYSTOLIC BLOOD PRESSURE: 130 MMHG | HEART RATE: 93 BPM | RESPIRATION RATE: 16 BRPM | BODY MASS INDEX: 34.2 KG/M2

## 2025-04-21 DIAGNOSIS — I48.11 LONGSTANDING PERSISTENT ATRIAL FIBRILLATION (MULTI): ICD-10-CM

## 2025-04-21 DIAGNOSIS — J01.90 ACUTE SINUSITIS, RECURRENCE NOT SPECIFIED, UNSPECIFIED LOCATION: Primary | ICD-10-CM

## 2025-04-21 DIAGNOSIS — E78.2 MIXED HYPERLIPIDEMIA: ICD-10-CM

## 2025-04-21 DIAGNOSIS — R05.1 ACUTE COUGH: ICD-10-CM

## 2025-04-21 DIAGNOSIS — M05.79 RHEUMATOID ARTHRITIS INVOLVING MULTIPLE SITES WITH POSITIVE RHEUMATOID FACTOR (MULTI): ICD-10-CM

## 2025-04-21 DIAGNOSIS — I10 BENIGN ESSENTIAL HYPERTENSION: ICD-10-CM

## 2025-04-21 PROCEDURE — 1160F RVW MEDS BY RX/DR IN RCRD: CPT | Performed by: INTERNAL MEDICINE

## 2025-04-21 PROCEDURE — 3078F DIAST BP <80 MM HG: CPT | Performed by: INTERNAL MEDICINE

## 2025-04-21 PROCEDURE — G2211 COMPLEX E/M VISIT ADD ON: HCPCS | Performed by: INTERNAL MEDICINE

## 2025-04-21 PROCEDURE — 1157F ADVNC CARE PLAN IN RCRD: CPT | Performed by: INTERNAL MEDICINE

## 2025-04-21 PROCEDURE — 99214 OFFICE O/P EST MOD 30 MIN: CPT | Performed by: INTERNAL MEDICINE

## 2025-04-21 PROCEDURE — 1123F ACP DISCUSS/DSCN MKR DOCD: CPT | Performed by: INTERNAL MEDICINE

## 2025-04-21 PROCEDURE — 3075F SYST BP GE 130 - 139MM HG: CPT | Performed by: INTERNAL MEDICINE

## 2025-04-21 PROCEDURE — 1159F MED LIST DOCD IN RCRD: CPT | Performed by: INTERNAL MEDICINE

## 2025-04-21 PROCEDURE — 1036F TOBACCO NON-USER: CPT | Performed by: INTERNAL MEDICINE

## 2025-04-21 RX ORDER — CEFDINIR 300 MG/1
300 CAPSULE ORAL 2 TIMES DAILY
Qty: 20 CAPSULE | Refills: 0 | Status: SHIPPED | OUTPATIENT
Start: 2025-04-21 | End: 2025-05-01

## 2025-04-21 RX ORDER — BENZONATATE 200 MG/1
200 CAPSULE ORAL 3 TIMES DAILY PRN
Qty: 42 CAPSULE | Refills: 0 | Status: SHIPPED | OUTPATIENT
Start: 2025-04-21 | End: 2025-05-21

## 2025-04-21 ASSESSMENT — ENCOUNTER SYMPTOMS
WOUND: 0
FLANK PAIN: 0
SPEECH DIFFICULTY: 0
TROUBLE SWALLOWING: 0
AGITATION: 0
ACTIVITY CHANGE: 0
SINUS PRESSURE: 0
ARTHRALGIAS: 1
ALLERGIC/IMMUNOLOGIC NEGATIVE: 1
NECK STIFFNESS: 0
CONSTIPATION: 0
NEUROLOGICAL NEGATIVE: 1
TREMORS: 0
BACK PAIN: 0
STRIDOR: 0
JOINT SWELLING: 0
BRUISES/BLEEDS EASILY: 0
CONFUSION: 0
CHEST TIGHTNESS: 0
FREQUENCY: 0
APPETITE CHANGE: 0
DIARRHEA: 0
UNEXPECTED WEIGHT CHANGE: 0
COLOR CHANGE: 0
ABDOMINAL PAIN: 0
EYE REDNESS: 0
HEADACHES: 0
BLOOD IN STOOL: 0
SHORTNESS OF BREATH: 0
MYALGIAS: 0
GASTROINTESTINAL NEGATIVE: 1
DIFFICULTY URINATING: 0
NAUSEA: 0
VOICE CHANGE: 0
SORE THROAT: 0
POLYPHAGIA: 0
HALLUCINATIONS: 0
DYSURIA: 0
FATIGUE: 1
ADENOPATHY: 0
WEAKNESS: 0
SEIZURES: 0
HEMATOLOGIC/LYMPHATIC NEGATIVE: 1
NECK PAIN: 0
SINUS PAIN: 0
FACIAL ASYMMETRY: 0
NUMBNESS: 0
CARDIOVASCULAR NEGATIVE: 1
WHEEZING: 0
EYE DISCHARGE: 0
ENDOCRINE NEGATIVE: 1
PSYCHIATRIC NEGATIVE: 1
POLYDIPSIA: 0
VOMITING: 0
DIZZINESS: 0
NERVOUS/ANXIOUS: 0
SLEEP DISTURBANCE: 0
COUGH: 1
LIGHT-HEADEDNESS: 0
EYES NEGATIVE: 1
PALPITATIONS: 0
EYE PAIN: 0

## 2025-04-21 NOTE — ASSESSMENT & PLAN NOTE
Rest at home, increase fluids intake.  You can take OTC (over the counter) Tylenol up to 2000 mg daily in divided doses as needed and other OTC medications except decongestants for symptoms control. Go to ER if feeling short of breath or having other worrisome symptoms (e.g. not able to keep food and fluids down, feeling dehydrated, etc).

## 2025-04-21 NOTE — PROGRESS NOTES
Subjective   Patient ID: Joce Galeana is a 84 y.o. male who presents for Sore Throat (Pt is here due to sore throat and coughing ).    HPI     This is 83 yo male with very complex medical problems including HTN, HLD, A. Fib, nephrolithiasis, RA, sciatica, OA, SCC.     We reviewed and discussed his medical conditions during today's visit.   His immunity status is compromised due to RA and multiple medical problems.     He has not been feeling well for more than 1 week. C/o chest congestion and cough. Tried OTC cold medications without improvement, feels that his symptoms have been getting worse.  Has been feeling tired.    Denies fever or chills, did not test himself for COVID/Flu.  Wife developed similar sx.      He was following with urology Dr. Matthew regarding nephrolithiasis and kidneys cysts.   Due to Dr. Matthew leaving , she will see new urologist.     He was admitted to [facility] 3 times in 2024 due to hematuria and nephrolithiasis.  Underwent lithotripsy and ureteroscopic stone basket extraction.      He follows with  rheumatologist Dr. Riley, diagnosed with RA and PMR, advised prednisone in addition to Methotrexate.  He takes Tramadol as needed for pain control, prescribed by rheumatologist, no signs or symptoms of tramadol misuse or overuse.           Follows regularly with cardiologist Dr. Ibarra.  Patient was diagnosed with A. fib. in June 2017, saw his cardiologist Dr. Ibarra, underwent cardioversion which was successful. Patient was started on Eliquis, Metoprolol 75 mg bid in addition to his medications. Amlodipine was discontinued.     Patient underwent Mohs surgery twice in October 2024 for two different skin ca (SCC) on left side of neck, and right posterior neck.     Review of Systems   Constitutional:  Positive for fatigue. Negative for activity change, appetite change and unexpected weight change.   HENT:  Positive for congestion. Negative for ear discharge, ear pain, hearing loss, mouth  "sores, nosebleeds, sinus pressure, sinus pain, sore throat, trouble swallowing and voice change.    Eyes: Negative.  Negative for pain, discharge, redness and visual disturbance.   Respiratory:  Positive for cough. Negative for chest tightness, shortness of breath, wheezing and stridor.    Cardiovascular: Negative.  Negative for chest pain, palpitations and leg swelling.   Gastrointestinal: Negative.  Negative for abdominal pain, blood in stool, constipation, diarrhea, nausea and vomiting.   Endocrine: Negative.  Negative for polydipsia, polyphagia and polyuria.   Genitourinary: Negative.  Negative for difficulty urinating, dysuria, flank pain, frequency and urgency.   Musculoskeletal:  Positive for arthralgias. Negative for back pain, gait problem, joint swelling, myalgias, neck pain and neck stiffness.   Skin: Negative.  Negative for color change, rash and wound.   Allergic/Immunologic: Negative.  Negative for environmental allergies, food allergies and immunocompromised state.   Neurological: Negative.  Negative for dizziness, tremors, seizures, syncope, facial asymmetry, speech difficulty, weakness, light-headedness, numbness and headaches.   Hematological: Negative.  Negative for adenopathy. Does not bruise/bleed easily.   Psychiatric/Behavioral: Negative.  Negative for agitation, behavioral problems, confusion, hallucinations, sleep disturbance and suicidal ideas. The patient is not nervous/anxious.    All other systems reviewed and are negative.      Objective   /60 (BP Location: Left arm, Patient Position: Sitting, BP Cuff Size: Adult)   Pulse 93   Temp 36.7 °C (98 °F) (Temporal)   Resp 16   Ht 1.6 m (5' 3\")   Wt 87.5 kg (193 lb)   SpO2 100%   BMI 34.19 kg/m²     Physical Exam  Vitals and nursing note reviewed.   Constitutional:       General: He is not in acute distress.     Appearance: Normal appearance.   HENT:      Head: Normocephalic and atraumatic.      Right Ear: Tympanic membrane, ear " canal and external ear normal.      Left Ear: Tympanic membrane, ear canal and external ear normal.      Nose: Nose normal. No congestion or rhinorrhea.      Mouth/Throat:      Pharynx: Pharyngeal swelling, posterior oropharyngeal erythema and postnasal drip present.   Eyes:      General:         Right eye: No discharge.         Left eye: No discharge.      Extraocular Movements: Extraocular movements intact.      Conjunctiva/sclera: Conjunctivae normal.      Pupils: Pupils are equal, round, and reactive to light.   Cardiovascular:      Rate and Rhythm: Normal rate and regular rhythm.      Pulses: Normal pulses.      Heart sounds: Normal heart sounds. No murmur heard.     No friction rub. No gallop.   Pulmonary:      Effort: Pulmonary effort is normal. No respiratory distress.      Breath sounds: Normal breath sounds. No stridor. No wheezing, rhonchi or rales.   Chest:      Chest wall: No tenderness.   Abdominal:      General: Bowel sounds are normal.      Palpations: Abdomen is soft. There is no mass.      Tenderness: There is no abdominal tenderness. There is no guarding or rebound.   Musculoskeletal:         General: No swelling or deformity. Normal range of motion.      Cervical back: Normal range of motion and neck supple. No rigidity or tenderness.      Right lower leg: No edema.      Left lower leg: No edema.   Lymphadenopathy:      Cervical: No cervical adenopathy.   Skin:     General: Skin is warm and dry.      Coloration: Skin is not jaundiced.      Findings: No bruising or erythema.   Neurological:      General: No focal deficit present.      Mental Status: He is alert and oriented to person, place, and time. Mental status is at baseline.      Cranial Nerves: No cranial nerve deficit.      Motor: No weakness.      Coordination: Coordination normal.      Gait: Gait normal.   Psychiatric:         Mood and Affect: Mood normal.         Behavior: Behavior normal.         Thought Content: Thought content  normal.         Judgment: Judgment normal.         Assessment/Plan   Problem List Items Addressed This Visit           ICD-10-CM       Cardiac and Vasculature    Atrial fibrillation (Multi) I48.91    Clinically stable. F/up with cardiology. Remains on Eliquis.         Benign essential hypertension I10    Controlled. Continue current treatment.          Hyperlipidemia E78.5    Continue statin.             ENT    Acute sinusitis - Primary J01.90    Rest at home, increase fluids intake.  You can take OTC (over the counter) Tylenol up to 2000 mg daily in divided doses as needed and other OTC medications except decongestants for symptoms control. Go to ER if feeling short of breath or having other worrisome symptoms (e.g. not able to keep food and fluids down, feeling dehydrated, etc).         Relevant Medications    cefdinir (Omnicef) 300 mg capsule       Multi-system (Lupus, Sarcoid)    Rheumatoid arthritis M06.9    Clinically stable. F/up with rheumatology.          Other Visit Diagnoses         Codes      Acute cough     R05.1    Relevant Medications    benzonatate (Tessalon) 200 mg capsule          It was a pleasure to see you today.  I would like to remind you about importance of a healthy lifestyle in order to improve your well-being and live longer.  Try to engage in physical activities for as tolerated.  Eat about 10 servings of fruits and vegetables daily. My advice is 2 servings of fruits and 8 servings of vegetables.  For vegetables choose at least half of them green and at least half of them fresh.  Please avoid sugar, salt, fried food and saturated fat.    F/up as scheduled or sooner if needed.

## 2025-04-23 ENCOUNTER — OFFICE VISIT (OUTPATIENT)
Dept: ORTHOPEDIC SURGERY | Facility: CLINIC | Age: 85
End: 2025-04-23
Payer: MEDICARE

## 2025-04-23 DIAGNOSIS — M48.062 LUMBAR STENOSIS WITH NEUROGENIC CLAUDICATION: ICD-10-CM

## 2025-04-23 DIAGNOSIS — M47.814 THORACIC SPONDYLOSIS: ICD-10-CM

## 2025-04-23 DIAGNOSIS — M47.816 LUMBAR SPONDYLOSIS: Primary | ICD-10-CM

## 2025-04-23 PROCEDURE — 1123F ACP DISCUSS/DSCN MKR DOCD: CPT | Performed by: PHYSICAL MEDICINE & REHABILITATION

## 2025-04-23 PROCEDURE — 1157F ADVNC CARE PLAN IN RCRD: CPT | Performed by: PHYSICAL MEDICINE & REHABILITATION

## 2025-04-23 PROCEDURE — 1036F TOBACCO NON-USER: CPT | Performed by: PHYSICAL MEDICINE & REHABILITATION

## 2025-04-23 PROCEDURE — 1159F MED LIST DOCD IN RCRD: CPT | Performed by: PHYSICAL MEDICINE & REHABILITATION

## 2025-04-23 PROCEDURE — 99213 OFFICE O/P EST LOW 20 MIN: CPT | Performed by: PHYSICAL MEDICINE & REHABILITATION

## 2025-04-23 PROCEDURE — 99212 OFFICE O/P EST SF 10 MIN: CPT | Performed by: PHYSICAL MEDICINE & REHABILITATION

## 2025-04-23 PROCEDURE — 1160F RVW MEDS BY RX/DR IN RCRD: CPT | Performed by: PHYSICAL MEDICINE & REHABILITATION

## 2025-04-23 SDOH — SOCIAL STABILITY: SOCIAL NETWORK: SOCIAL ACTIVITY:: 2

## 2025-04-23 NOTE — PROGRESS NOTES
Follow Up Note    Today's Date:   4/23/2025     Assessment: Very pleasant 84-year-old male with acute on chronic primarily left thoracolumbar pain.  History of kidney stones.  Recently evaluated in the ER with no obvious obstruction but he does have a renal calculi.  Pain and symptoms are severe and impacting his daily activity  -Eliquis for A-lola  -Noted moderate to severe central canal stenosis at L3-4 and to a greater extent at L4-5 on abdominal CT scan.  He does endorse intermittent radicular and/or neurogenic claudication pain  -Moderate to severe facet arthropathy    - April 23, 2025 update: Very happy with his current progress following physical therapy.  He understands importance of maintaining his home exercise program and stretching regimen.  No longer using medications regularly but has relief with the occasional Tylenol.  At this time he will follow-up with us on an as-needed basis.     PLAN:  1)  Imaging/Diagnostic Studies: Reviewed and interpreted most recent thoracic and lumbar x-rays-diffuse spondylosis with noted bony bridging throughout the thoracic spine as well as severe facet arthropathy but significant from L4-S1 bilaterally.  Extensive reviewed and interpreted most recent abdominal CT scan with focus on the lumbar spine.  Moderate to severe central canal stenosis is appreciated along with severe facet arthropathy.    2)  Therapy/Rehabilitation: Completed  3)  Pharmacological Management: Discussed potential gabapentin or Lyrica-he deferred at this time  4)  Spine/Surgical Interventions: None at this time  5)  Alternative Treatments: May consider alternative treatment options in the future including manipulation (chiropractor versus osteopathic) and/or acupuncture if patient does not obtain optimal relief with initial treatment plan.  6)  Consultations: No new  7)  Follow -up: 4-6 weeks or PRN if symptoms worsen/do not improve.   8)  Future treatment considerations: Lumbar and/or thoracic MRI to  further assess.  Trial of gabapentin or Lyrica.  May benefit from lumbar NGUYỄN.  Understands to return to the emergency department or follow-up with his nephrology team if severe visceral kidney stone pain occurs    Patient advised of the difference between hurt and harm and advised to continue with all normal activities and exercises. Patient verbalized understanding of the above plan and was happy with the care provided.      The above clinical summary has been dictated with voice recognition software. It has not been proofread for grammatical errors, typographical mistakes, or other semantic inconsistencies.    Thank you for visiting our office today. It was our pleasure to take part in your healthcare.     Do not hesitate to call with any questions regarding your plan of care after leaving at [phone]    To clinicians, thank you very much for this kind referral. It is a privilege to partner with you in the care of your patients. My office would be delighted to assist you with any further consultations or with questions regarding the plan of care outlined. Do not hesitate to call the office or contact me directly.     Sincerely,    POITR Mckenzie MD  , Physical Medicine and Rehabilitation, Orthopedic Spine  [facility] [employer]  [facility]        Joce Galeana  is a 84 y.o. male who was last seen 2/26/2025.  Since the last visit the pain is significantly improved.   Denies any significant pain at this time.  He is able to perform his everyday activities as well as mild to moderate exercise.  Denies any worsening neurological deficits.  PT:  Completed.  Doing his HEP and Stretches  [place]:  Not interested in taking            TREATMENTS  IN THE LAST SIX MONTHS     Active conservative therapy  in the last six months (see below)              1. Physical therapy:  yes                                                                                    2. Home exercise program after PT:  yes                                                    3. A physician supervised home exercise program (HEP):   yes              4. Chiropractic Care:                                                                      Passive conservative therapy  in the last six months (see below)              1. NSAIDS:                                                                                                           2. Prescription pain medication:                                                              3. Acupuncture:                                                                                             4. Tens unit:      Patient denies bowel/bladder incontinence, denies fever, denies unintentional weight loss, denies clumsiness of hands, feet, or dropping things.  Denies any constitutional or myelopathic symptomatology.     ROS: Other than listed in HPI, PMHX below, the patient denies any complaint of the following: severe cough, fainting, vision or language changes, shortness of breath, chest pain, nausea, vomiting or diarrhea, rash, dysuria, seizures, excessive sweating, or bleeding problems.    I have confirmed and edited as necessary Past Medical, Past Surgical, Family, Social History and ROS as obtained by others. No new sig. changes since last visit.       PHYSICAL EXAM:   GENERAL APPEARANCE:  Well nourished, well developed, and no apparent distress.  NEURO PSYCH: Patient oriented to person, place, Mood pleasant. Benign affect.  MUSCULOSKELETAL and NEUROLOGICAL   MOTOR: functionally full in bilat LE  GAIT: forward flexed posture but steady without the use of assistive device     DATA REVIEW:   The below imaging studies were personally reviewed and discussed with the patient.    Medical Decision Making:  The above note constitutes a Moderate to High level of medical decision making based on past data and imaging review, new and chronic symptoms with  exacerbation, change in weakness or sensation, new imaging and diagnostic studies ordered, discussion of potential interventional or surgical treatment options, acute or chronic pain that may pose a threat to bodily function.    Medications Ordered Prior to Encounter[1]     Medical History[2]     Surgical History[3]     RX Allergies[4]          [1]   Current Outpatient Medications on File Prior to Visit   Medication Sig Dispense Refill    acetaminophen (Tylenol Extra Strength) 500 mg tablet Take 2 tablets (1,000 mg) by mouth 2 times a day. Morning and evening      albuterol 2.5 mg /3 mL (0.083 %) nebulizer solution Inhale 3 mL (2.5 mg).      apixaban (Eliquis) 5 mg tablet Take 1 tablet (5 mg) by mouth 2 times a day.      atorvastatin (Lipitor) 20 mg tablet TAKE 1 TABLET BY MOUTH ONCE  DAILY 90 tablet 3    benzonatate (Tessalon) 200 mg capsule Take 1 capsule (200 mg) by mouth 3 times a day as needed for cough. Do not crush or chew. 42 capsule 0    cefdinir (Omnicef) 300 mg capsule Take 1 capsule (300 mg) by mouth 2 times a day for 10 days. 20 capsule 0    diclofenac sodium (Voltaren) 1 % gel Apply 4.5 inches (4 g) topically 2 times a day as needed (pain).      enalapril (Vasotec) 10 mg tablet Take 1 tablet (10 mg) by mouth once daily. 90 tablet 3    folic acid (Folvite) 1 mg tablet Take 1 tablet (1 mg) by mouth once daily.      latanoprost (Xalatan) 0.005 % ophthalmic solution Administer 1 drop into both eyes once daily at bedtime.      meclizine (Antivert) 25 mg tablet Take 1 tablet (25 mg) by mouth 2 times a day as needed for dizziness.      menthol (Biofreeze, menthol,) 4 % gel gel Apply 1 Application topically 2 times a day as needed (pain).      methotrexate (Trexall) 2.5 mg tablet Take 6 tablets (15 mg total) by mouth 1 (one) time per week.  Sunday      metoprolol succinate XL (Toprol-XL) 25 mg 24 hr tablet Take 3 tablets (75 mg) by mouth 2 times a day.      predniSONE (Deltasone) 1 mg tablet Take 4 tablets (4  mg) by mouth once daily. (Patient taking differently: Take 7 tablets (7 mg) by mouth once daily.)      tamsulosin (Flomax) 0.4 mg 24 hr capsule Take 1 capsule (0.4 mg) by mouth 2 times a day. 180 capsule 3    traMADol (Ultram) 50 mg tablet Take 1 tablet (50 mg) by mouth 2 times a day as needed.      UNABLE TO FIND Apply 1 Application topically 2 times a day as needed (pain). Med Name: Blue Emu gel      wheat dextrin (Benefiber Clear SF, dextrin,) 3 gram/3.5 gram powder in packet Take 2 Scoops by mouth once daily.       No current facility-administered medications on file prior to visit.   [2]   Past Medical History:  Diagnosis Date    Arrhythmia     afib    Atrial fibrillation (Multi)     BPH (benign prostatic hyperplasia)     Bradycardia, unspecified 02/08/2019    Persistent sinus bradycardia    CKD (chronic kidney disease), stage III (Multi)     Hyperlipidemia     Hypertension     Irregular heart beat     Long term current use of anticoagulant     Nephrolithiasis     Other conditions influencing health status     Skin Cancer    Personal history of other diseases of the circulatory system     History of hypertension    Personal history of other diseases of the circulatory system     History of cardiac disorder    PMR (polymyalgia rheumatica) (Multi)     Rheumatoid arthritis, unspecified     Rheumatoid arthritis   [3]   Past Surgical History:  Procedure Laterality Date    COLONOSCOPY  12/03/2014    Complete Colonoscopy    LITHOTRIPSY      OTHER SURGICAL HISTORY      MOHS    ROTATOR CUFF REPAIR  12/20/2013    Rotator Cuff Repair    URETERAL STENT PLACEMENT     [4] No Known Allergies

## 2025-05-17 LAB
ALBUMIN SERPL-MCNC: 3.9 G/DL (ref 3.6–5.1)
ALP SERPL-CCNC: 51 U/L (ref 35–144)
ALT SERPL-CCNC: 18 U/L (ref 9–46)
ANION GAP SERPL CALCULATED.4IONS-SCNC: 8 MMOL/L (CALC) (ref 7–17)
AST SERPL-CCNC: 19 U/L (ref 10–35)
BILIRUB SERPL-MCNC: 0.7 MG/DL (ref 0.2–1.2)
BUN SERPL-MCNC: 21 MG/DL (ref 7–25)
CALCIUM SERPL-MCNC: 8.7 MG/DL (ref 8.6–10.3)
CHLORIDE SERPL-SCNC: 104 MMOL/L (ref 98–110)
CHOLEST SERPL-MCNC: 164 MG/DL
CHOLEST/HDLC SERPL: 2.7 (CALC)
CO2 SERPL-SCNC: 29 MMOL/L (ref 20–32)
CREAT SERPL-MCNC: 1.18 MG/DL (ref 0.7–1.22)
EGFRCR SERPLBLD CKD-EPI 2021: 61 ML/MIN/1.73M2
ERYTHROCYTE [DISTWIDTH] IN BLOOD BY AUTOMATED COUNT: 13.2 % (ref 11–15)
GLUCOSE SERPL-MCNC: 85 MG/DL (ref 65–99)
HCT VFR BLD AUTO: 41.2 % (ref 38.5–50)
HDLC SERPL-MCNC: 61 MG/DL
HGB BLD-MCNC: 13.2 G/DL (ref 13.2–17.1)
IRON SATN MFR SERPL: 34 % (CALC) (ref 20–48)
IRON SERPL-MCNC: 93 MCG/DL (ref 50–180)
LDLC SERPL CALC-MCNC: 82 MG/DL (CALC)
MCH RBC QN AUTO: 34.6 PG (ref 27–33)
MCHC RBC AUTO-ENTMCNC: 32 G/DL (ref 32–36)
MCV RBC AUTO: 108.1 FL (ref 80–100)
NONHDLC SERPL-MCNC: 103 MG/DL (CALC)
PLATELET # BLD AUTO: 121 THOUSAND/UL (ref 140–400)
PMV BLD REES-ECKER: 12.1 FL (ref 7.5–12.5)
POTASSIUM SERPL-SCNC: 4.5 MMOL/L (ref 3.5–5.3)
PROT SERPL-MCNC: 6.3 G/DL (ref 6.1–8.1)
RBC # BLD AUTO: 3.81 MILLION/UL (ref 4.2–5.8)
SODIUM SERPL-SCNC: 141 MMOL/L (ref 135–146)
TIBC SERPL-MCNC: 270 MCG/DL (CALC) (ref 250–425)
TRIGL SERPL-MCNC: 115 MG/DL
TSH SERPL-ACNC: 1.8 MIU/L (ref 0.4–4.5)
VIT B12 SERPL-MCNC: 450 PG/ML (ref 200–1100)
WBC # BLD AUTO: 4.1 THOUSAND/UL (ref 3.8–10.8)

## 2025-05-18 NOTE — RESULT ENCOUNTER NOTE
Your recent lab work was acceptable. All results may not be within normal range but they are not clinically significant at this time and do not require change in your therapy. We will discuss details during your next office visit. Please keep your next appointment as scheduled. Dr. Milligan

## 2025-05-27 ENCOUNTER — APPOINTMENT (OUTPATIENT)
Dept: PRIMARY CARE | Facility: CLINIC | Age: 85
End: 2025-05-27
Payer: MEDICARE

## 2025-05-27 VITALS
HEART RATE: 83 BPM | WEIGHT: 190.8 LBS | BODY MASS INDEX: 33.81 KG/M2 | SYSTOLIC BLOOD PRESSURE: 110 MMHG | HEIGHT: 63 IN | OXYGEN SATURATION: 98 % | TEMPERATURE: 98 F | DIASTOLIC BLOOD PRESSURE: 60 MMHG | RESPIRATION RATE: 16 BRPM

## 2025-05-27 DIAGNOSIS — I10 BENIGN ESSENTIAL HYPERTENSION: ICD-10-CM

## 2025-05-27 DIAGNOSIS — I48.11 LONGSTANDING PERSISTENT ATRIAL FIBRILLATION (MULTI): ICD-10-CM

## 2025-05-27 DIAGNOSIS — E78.2 MIXED HYPERLIPIDEMIA: ICD-10-CM

## 2025-05-27 DIAGNOSIS — N18.31 STAGE 3A CHRONIC KIDNEY DISEASE (MULTI): ICD-10-CM

## 2025-05-27 DIAGNOSIS — M06.4 INFLAMMATORY POLYARTHROPATHY (MULTI): ICD-10-CM

## 2025-05-27 DIAGNOSIS — M05.79 RHEUMATOID ARTHRITIS WITH RHEUMATOID FACTOR OF MULTIPLE SITES WITHOUT ORGAN OR SYSTEMS INVOLVEMENT (MULTI): ICD-10-CM

## 2025-05-27 DIAGNOSIS — M05.9 SEROPOSITIVE RHEUMATOID ARTHRITIS: ICD-10-CM

## 2025-05-27 DIAGNOSIS — Z00.00 ROUTINE GENERAL MEDICAL EXAMINATION AT HEALTH CARE FACILITY: Primary | ICD-10-CM

## 2025-05-27 DIAGNOSIS — M05.739 RHEUMATOID ARTHRITIS INVOLVING WRIST WITH POSITIVE RHEUMATOID FACTOR, UNSPECIFIED LATERALITY (MULTI): ICD-10-CM

## 2025-05-27 ASSESSMENT — ENCOUNTER SYMPTOMS
GASTROINTESTINAL NEGATIVE: 1
ACTIVITY CHANGE: 0
TROUBLE SWALLOWING: 0
SORE THROAT: 0
CHEST TIGHTNESS: 0
FACIAL ASYMMETRY: 0
FLANK PAIN: 0
SINUS PRESSURE: 0
ABDOMINAL PAIN: 0
DIFFICULTY URINATING: 0
DIZZINESS: 0
EYES NEGATIVE: 1
POLYDIPSIA: 0
EYE PAIN: 0
CONSTITUTIONAL NEGATIVE: 1
SHORTNESS OF BREATH: 0
APPETITE CHANGE: 0
HEADACHES: 0
NAUSEA: 0
HEMATOLOGIC/LYMPHATIC NEGATIVE: 1
CONFUSION: 0
AGITATION: 0
NEUROLOGICAL NEGATIVE: 1
PALPITATIONS: 0
BRUISES/BLEEDS EASILY: 0
PSYCHIATRIC NEGATIVE: 1
NERVOUS/ANXIOUS: 0
WEAKNESS: 0
DEPRESSION: 0
SPEECH DIFFICULTY: 0
POLYPHAGIA: 0
LIGHT-HEADEDNESS: 0
RESPIRATORY NEGATIVE: 1
DIARRHEA: 0
CARDIOVASCULAR NEGATIVE: 1
DYSURIA: 0
VOICE CHANGE: 0
ARTHRALGIAS: 1
HALLUCINATIONS: 0
ADENOPATHY: 0
WOUND: 0
SINUS PAIN: 0
UNEXPECTED WEIGHT CHANGE: 0
SEIZURES: 0
LOSS OF SENSATION IN FEET: 0
NECK PAIN: 0
NECK STIFFNESS: 0
EYE REDNESS: 0
JOINT SWELLING: 0
BLOOD IN STOOL: 0
SLEEP DISTURBANCE: 0
EYE DISCHARGE: 0
FREQUENCY: 0
TREMORS: 0
BACK PAIN: 1
ALLERGIC/IMMUNOLOGIC NEGATIVE: 1
MYALGIAS: 0
VOMITING: 0
NUMBNESS: 0
STRIDOR: 0
COUGH: 0
COLOR CHANGE: 0
OCCASIONAL FEELINGS OF UNSTEADINESS: 0
WHEEZING: 0
CONSTIPATION: 0
ENDOCRINE NEGATIVE: 1

## 2025-05-27 ASSESSMENT — ACTIVITIES OF DAILY LIVING (ADL)
BATHING: INDEPENDENT
DRESSING: INDEPENDENT
MANAGING_FINANCES: INDEPENDENT
GROCERY_SHOPPING: INDEPENDENT
DOING_HOUSEWORK: INDEPENDENT
TAKING_MEDICATION: INDEPENDENT

## 2025-05-27 NOTE — ASSESSMENT & PLAN NOTE
Stable. Will monitor.        It was a pleasure to see you today.  I would like to remind you about importance of a healthy lifestyle in order to improve your well-being and live longer.  Try to engage in physical activities for at least 150 minutes per week.  Eat about 10 servings of fruits and vegetables daily. My advice is 2 servings of fruits and 8 servings of vegetables.  For vegetables choose at least half of them green and at least half of them fresh.  Please avoid sugar, salt, fried food and saturated fat.    I spent a total of 30 minutes on the date of service for follow up visit, which included preparing to see the patient, face-to-face patient care, completing clinical documentation, obtaining and/or reviewing separately obtained history, performing a medically appropriate examination, counseling and educating the patient/family/caregiver, ordering medications, tests, or procedures, communicating with other health care providers (not separately reported), independently interpreting results (not separately reported), communicating results to the patient/family/caregiver, and care coordination (not separately reported).

## 2025-05-27 NOTE — PROGRESS NOTES
Subjective   Reason for Visit: Joce Galeana is an 84 y.o. male here for a Medicare Wellness visit.     Past Medical, Surgical, and Family History reviewed and updated in chart.    Reviewed all medications by prescribing practitioner or clinical pharmacist (such as prescriptions, OTCs, herbal therapies and supplements) and documented in the medical record.      Patient Care Team:  Zainab Henning MD PhD as PCP - General  Zainab Henning MD PhD as PCP - [facility] [place] Attributed Provider     Review of Systems   Constitutional: Negative.  Negative for activity change, appetite change and unexpected weight change.   HENT:  Negative for congestion, ear discharge, ear pain, mouth sores, nosebleeds, sinus pressure, sinus pain, sore throat, trouble swallowing and voice change.    Eyes: Negative.  Negative for pain, discharge, redness and visual disturbance.   Respiratory: Negative.  Negative for cough, chest tightness, shortness of breath, wheezing and stridor.    Cardiovascular: Negative.  Negative for chest pain, palpitations and leg swelling.   Gastrointestinal: Negative.  Negative for abdominal pain, blood in stool, constipation, diarrhea, nausea and vomiting.   Endocrine: Negative.  Negative for polydipsia, polyphagia and polyuria.   Genitourinary: Negative.  Negative for difficulty urinating, dysuria, flank pain, frequency and urgency.   Musculoskeletal:  Positive for arthralgias and back pain. Negative for gait problem, joint swelling, myalgias, neck pain and neck stiffness.   Skin: Negative.  Negative for color change, rash and wound.   Allergic/Immunologic: Negative.  Negative for environmental allergies, food allergies and immunocompromised state.   Neurological: Negative.  Negative for dizziness, tremors, seizures, syncope, facial asymmetry, speech difficulty, weakness, light-headedness, numbness and headaches.   Hematological: Negative.  Negative for adenopathy. Does not bruise/bleed easily.  "  Psychiatric/Behavioral: Negative.  Negative for agitation, behavioral problems, confusion, hallucinations, sleep disturbance and suicidal ideas. The patient is not nervous/anxious.    All other systems reviewed and are negative.      Objective   Vitals:  /60 (BP Location: Right arm, Patient Position: Sitting, BP Cuff Size: Adult)   Pulse 83   Temp 36.7 °C (98 °F) (Temporal)   Resp 16   Ht 1.6 m (5' 3\")   Wt 86.5 kg (190 lb 12.8 oz)   SpO2 98%   BMI 33.80 kg/m²       HPI:    Patient comes for Medicare Wellness and Follow up visit.     Patient has been feeling pretty good and has been complaint with prescribed medications.    We reviewed blood work including CBC, CMP, TSH, Lipid Panel, Vit B12 level  done in May 2025.  Results within acceptable range.      Pneumonia Vacc:  2023  Advance Directives: Healthcare Living Will and POA on file.   POA discussed, confirmed and documented in patient's  EPIC record.     This is 83 yo male with very complex medical problems including HTN, HLD, A. Fib, nephrolithiasis, RA, sciatica, OA, SCC.     We reviewed and discussed his medical conditions during today's visit.     Follows with Pain Medicine regarding back pain with left sciatica.    He follows with  rheumatologist Dr. Riley, diagnosed with RA and PMR, advised prednisone in addition to Methotrexate.  He takes Tramadol as needed for pain control, prescribed by rheumatologist, no signs or symptoms of tramadol misuse or overuse.       He has been  following with new urologist Dr. Yancey regarding nephrolithiasis and kidneys cysts.      He was admitted to [facility] 3 times in 2024 due to hematuria and nephrolithiasis.  Underwent lithotripsy and ureteroscopic stone basket extraction.              Follows regularly with cardiologist Dr. Ibarra.  Patient was diagnosed with A. fib. in June 2017, saw his cardiologist Dr. Ibarra, underwent cardioversion which was successful. Patient was started on Eliquis, Metoprolol 75 mg " bid in addition to his medications. Amlodipine was discontinued.     Patient underwent Mohs surgery twice in October 2024 for two different skin ca (SCC) on left side of neck, and right posterior neck.     Remains active and independent, lives with wife, continues to drive.         Physical Exam  Vitals and nursing note reviewed.   Constitutional:       General: He is not in acute distress.     Appearance: Normal appearance.   HENT:      Head: Normocephalic and atraumatic.      Right Ear: Tympanic membrane, ear canal and external ear normal.      Left Ear: Tympanic membrane, ear canal and external ear normal.      Nose: Nose normal. No congestion or rhinorrhea.      Mouth/Throat:      Mouth: Mucous membranes are moist.      Pharynx: Oropharynx is clear.   Eyes:      General:         Right eye: No discharge.         Left eye: No discharge.      Extraocular Movements: Extraocular movements intact.      Conjunctiva/sclera: Conjunctivae normal.      Pupils: Pupils are equal, round, and reactive to light.   Cardiovascular:      Rate and Rhythm: Normal rate and regular rhythm.      Pulses: Normal pulses.      Heart sounds: Normal heart sounds. No murmur heard.     No friction rub. No gallop.   Pulmonary:      Effort: Pulmonary effort is normal. No respiratory distress.      Breath sounds: Normal breath sounds. No stridor. No wheezing, rhonchi or rales.   Chest:      Chest wall: No tenderness.   Abdominal:      General: Bowel sounds are normal.      Palpations: Abdomen is soft. There is no mass.      Tenderness: There is no abdominal tenderness. There is no guarding or rebound.   Musculoskeletal:         General: No swelling or deformity. Normal range of motion.      Cervical back: Normal range of motion and neck supple. No rigidity or tenderness.      Right lower leg: No edema.      Left lower leg: No edema.   Lymphadenopathy:      Cervical: No cervical adenopathy.   Skin:     General: Skin is warm and dry.       Coloration: Skin is not jaundiced.      Findings: No bruising or erythema.   Neurological:      General: No focal deficit present.      Mental Status: He is alert and oriented to person, place, and time. Mental status is at baseline.      Cranial Nerves: No cranial nerve deficit.      Motor: No weakness.      Coordination: Coordination normal.      Gait: Gait normal.   Psychiatric:         Mood and Affect: Mood normal.         Behavior: Behavior normal.         Thought Content: Thought content normal.         Judgment: Judgment normal.         Assessment & Plan  Routine general medical examination at health care facility    Orders:    1 Year Follow Up In [facility] - PCP - Wellness Exam; Future    Mixed hyperlipidemia  Continue statin.          Benign essential hypertension  Controlled. Continue current treatment.          Longstanding persistent atrial fibrillation (Multi)  Clinically stable. F/up with cardiology. Remains on Eliquis.         Inflammatory polyarthropathy (Multi)  Stable. F/up with rheumatology         Seropositive rheumatoid arthritis  Stable. F/up with rheumatology.          Rheumatoid arthritis involving wrist with positive rheumatoid factor, unspecified laterality (Multi)  Stable. F/up with rheumatology.          Rheumatoid arthritis with rheumatoid factor of multiple sites without organ or systems involvement (Multi)  Stable. F/up with rheumatology.          Stage 3a chronic kidney disease (Multi)  Stable. Will monitor.        It was a pleasure to see you today.  I would like to remind you about importance of a healthy lifestyle in order to improve your well-being and live longer.  Try to engage in physical activities for at least 150 minutes per week.  Eat about 10 servings of fruits and vegetables daily. My advice is 2 servings of fruits and 8 servings of vegetables.  For vegetables choose at least half of them green and at least half of them fresh.  Please avoid sugar, salt, fried  food and saturated fat.    I spent a total of 30 minutes on the date of service for follow up visit, which included preparing to see the patient, face-to-face patient care, completing clinical documentation, obtaining and/or reviewing separately obtained history, performing a medically appropriate examination, counseling and educating the patient/family/caregiver, ordering medications, tests, or procedures, communicating with other health care providers (not separately reported), independently interpreting results (not separately reported), communicating results to the patient/family/caregiver, and care coordination (not separately reported).       F/up in 6 months or sooner if needed.

## 2025-06-24 ENCOUNTER — OFFICE VISIT (OUTPATIENT)
Dept: UROLOGY | Facility: CLINIC | Age: 85
End: 2025-06-24
Payer: MEDICARE

## 2025-06-24 VITALS
SYSTOLIC BLOOD PRESSURE: 131 MMHG | DIASTOLIC BLOOD PRESSURE: 81 MMHG | WEIGHT: 191 LBS | HEART RATE: 91 BPM | BODY MASS INDEX: 33.83 KG/M2 | TEMPERATURE: 97.8 F

## 2025-06-24 DIAGNOSIS — R35.0 BENIGN PROSTATIC HYPERPLASIA WITH URINARY FREQUENCY: ICD-10-CM

## 2025-06-24 DIAGNOSIS — Z12.5 PROSTATE CANCER SCREENING: ICD-10-CM

## 2025-06-24 DIAGNOSIS — N20.0 KIDNEY STONE: ICD-10-CM

## 2025-06-24 DIAGNOSIS — K57.32 DIVERTICULITIS OF LARGE INTESTINE WITHOUT PERFORATION OR ABSCESS WITHOUT BLEEDING: Primary | ICD-10-CM

## 2025-06-24 DIAGNOSIS — N40.1 BENIGN PROSTATIC HYPERPLASIA WITH URINARY FREQUENCY: ICD-10-CM

## 2025-06-24 PROCEDURE — 3079F DIAST BP 80-89 MM HG: CPT | Performed by: STUDENT IN AN ORGANIZED HEALTH CARE EDUCATION/TRAINING PROGRAM

## 2025-06-24 PROCEDURE — 1036F TOBACCO NON-USER: CPT | Performed by: STUDENT IN AN ORGANIZED HEALTH CARE EDUCATION/TRAINING PROGRAM

## 2025-06-24 PROCEDURE — 1159F MED LIST DOCD IN RCRD: CPT | Performed by: STUDENT IN AN ORGANIZED HEALTH CARE EDUCATION/TRAINING PROGRAM

## 2025-06-24 PROCEDURE — 3075F SYST BP GE 130 - 139MM HG: CPT | Performed by: STUDENT IN AN ORGANIZED HEALTH CARE EDUCATION/TRAINING PROGRAM

## 2025-06-24 PROCEDURE — 1160F RVW MEDS BY RX/DR IN RCRD: CPT | Performed by: STUDENT IN AN ORGANIZED HEALTH CARE EDUCATION/TRAINING PROGRAM

## 2025-06-24 PROCEDURE — 99214 OFFICE O/P EST MOD 30 MIN: CPT | Performed by: STUDENT IN AN ORGANIZED HEALTH CARE EDUCATION/TRAINING PROGRAM

## 2025-06-24 RX ORDER — AMOXICILLIN AND CLAVULANATE POTASSIUM 875; 125 MG/1; MG/1
1 TABLET, FILM COATED ORAL EVERY 8 HOURS
Qty: 21 TABLET | Refills: 0 | Status: SHIPPED | OUTPATIENT
Start: 2025-06-24

## 2025-06-24 RX ORDER — DEXTROMETHORPHAN POLISTIREX 30 MG/5 ML
1 SUSPENSION, EXTENDED RELEASE 12 HR ORAL DAILY
COMMUNITY

## 2025-06-24 RX ORDER — AMOXICILLIN AND CLAVULANATE POTASSIUM 875; 125 MG/1; MG/1
1 TABLET, FILM COATED ORAL EVERY 8 HOURS
Qty: 21 TABLET | Refills: 0 | Status: SHIPPED | OUTPATIENT
Start: 2025-06-24 | End: 2025-06-24 | Stop reason: SDUPTHER

## 2025-06-24 ASSESSMENT — PATIENT HEALTH QUESTIONNAIRE - PHQ9
1. LITTLE INTEREST OR PLEASURE IN DOING THINGS: NOT AT ALL
SUM OF ALL RESPONSES TO PHQ9 QUESTIONS 1 AND 2: 0
2. FEELING DOWN, DEPRESSED OR HOPELESS: NOT AT ALL

## 2025-06-24 ASSESSMENT — ENCOUNTER SYMPTOMS
DEPRESSION: 0
OCCASIONAL FEELINGS OF UNSTEADINESS: 0
LOSS OF SENSATION IN FEET: 0

## 2025-06-24 NOTE — PROGRESS NOTES
Chief complaint:  No chief complaint on file.  Referring physician:  No ref. provider found     SUBJECTIVE:  HPI:  Joce Galeana is a 84 y.o. male with a history of HTN, HLD, Afib on Eliquis, CKD3a, RA on methotrexate, uric acid nephrolithiasis who presents for follow up of nephrolithiasis.    6/24/25 - New left lower quadrant abdominal pain x1 week.  Associated with difficulty passing Bms.  No blood.  No LUTS.  No fevers, chills, nausea, vomiting.  Hurts with abdominal straining/bending.  Prior CT showed diverticulosis.  Symptoms different from kidney stone episodes.  Taking baking soda BID.    2/20/25 - Has followed with Dr. Matthew.  Had staged left ureteroscopy for very large (>2cm) proximal ureteral and renal stone burden.  Recently had CT that showed persistent 7mm LLP stone.  Not symptomatic at present.  Curious what should be done for these stones.  Stone analysis 100% uric acid.    Medical history:   has a past medical history of Arrhythmia, Atrial fibrillation (Multi), BPH (benign prostatic hyperplasia), Bradycardia, unspecified (02/08/2019), CKD (chronic kidney disease), stage III (Multi), Hyperlipidemia, Hypertension, Irregular heart beat, Long term current use of anticoagulant, Nephrolithiasis, Other conditions influencing health status, Personal history of other diseases of the circulatory system, Personal history of other diseases of the circulatory system, PMR (polymyalgia rheumatica) (Multi), and Rheumatoid arthritis, unspecified.   Surgical history:   has a past surgical history that includes Rotator cuff repair (12/20/2013); Colonoscopy (12/03/2014); Lithotripsy; Other surgical history; and Ureteral stent placement.  Family history:  family history includes Heart attack in his father; Heart disease in his mother; Hypertension in his father and mother; Skin cancer in his mother.  Social history:   reports that he has never smoked. He has never been exposed to tobacco smoke. He has never used  smokeless tobacco. He reports that he does not currently use alcohol. He reports that he does not use drugs.    Medications:    Current Outpatient Medications   Medication Instructions    acetaminophen (TYLENOL EXTRA STRENGTH) 1,000 mg, 2 times daily    albuterol 2.5 mg    apixaban (ELIQUIS) 5 mg, 2 times daily    atorvastatin (LIPITOR) 20 mg, oral, Daily    diclofenac sodium (VOLTAREN) 4 g, 2 times daily PRN    enalapril (VASOTEC) 10 mg, oral, Daily    folic acid (FOLVITE) 1 mg, Daily    latanoprost (Xalatan) 0.005 % ophthalmic solution 1 drop, Nightly    meclizine (Antivert) 25 mg tablet Take 1 tablet (25 mg) by mouth 2 times a day as needed for dizziness.    menthol (Biofreeze, menthol,) 4 % gel gel 1 Application, 2 times daily PRN    methotrexate (Trexall) 2.5 mg tablet 6 tablets, Once Weekly    metoprolol succinate XL (TOPROL-XL) 75 mg, 2 times daily    predniSONE (Deltasone) 1 mg tablet Take 4 tablets (4 mg) by mouth once daily.    tamsulosin (FLOMAX) 0.4 mg, oral, 2 times daily    traMADol (ULTRAM) 50 mg, 2 times daily PRN    UNABLE TO FIND 1 Application, 2 times daily PRN    wheat dextrin (Benefiber Clear SF, dextrin,) 3 gram/3.5 gram powder in packet 2 Scoops, Daily      Allergies:    RX Allergies[1]     ROS:  14-point review of systems negative except as noted above.    OBJECTIVE:  There were no vitals taken for this visit.There is no height or weight on file to calculate BMI.    Physical exam  General:  No acute distress  HEENT:  EOMI  CV:  Regular rate  Pulm:  Nonlabored respirations  Abd:  Soft, mildly distended, LLQ tender to deep palpation without rebound/guarding  :  No suprapubic or CVA tenderness, small fat-containing left inguinal hernia, no right hernia  MSK:  No contractures  Neuro:  Motor intact  Psych:  Appropriate affect    Labs:    Lab Results   Component Value Date    WBC 4.1 05/15/2025    HGB 13.2 05/15/2025    HCT 41.2 05/15/2025     (L) 05/15/2025    ALT 18 05/15/2025    AST 19  "05/15/2025     05/15/2025    K 4.5 05/15/2025     05/15/2025    CREATININE 1.18 05/15/2025    BUN 21 05/15/2025    CO2 29 05/15/2025    INR 1.2 (H) 02/21/2024     Urine Culture (no units)   Date Value   03/27/2024 No growth    No results found for: \"PSA\"    Imaging:  All imaging discussed in HPI was independently reviewed.    ASSESSMENT:  Acute diverticulitis, uncomplicated  Prostate cancer screening    Discussed that while not my area of expertise this seems to be a straightforward case of acute diverticulitis.  Given on methotrexate for RA recommend treating with antibiotics.  Follow up with PCP or present to ED if fevers, uncontrolled pain.    Discussed cessation of PSA screening, which had been discussed with PCP.  Essentially little expected benefit from primary treatment of localized disease, so reasonable to stop checking.  Could continue with watchful waiting intent which he declined.    PLAN:  Augmentin 875 TID x7d  Keep follow up in August as scheduled for stone surveillance  Agree with stopping PSA checks    Follow-up as scheduled with CT prior    Abelardo Yancey MD    Problem List Items Addressed This Visit    None            [1] No Known Allergies    "

## 2025-08-11 ENCOUNTER — HOSPITAL ENCOUNTER (OUTPATIENT)
Dept: RADIOLOGY | Facility: CLINIC | Age: 85
Discharge: HOME | End: 2025-08-11
Payer: MEDICARE

## 2025-08-11 DIAGNOSIS — N20.0 KIDNEY STONE: ICD-10-CM

## 2025-08-11 PROCEDURE — 74176 CT ABD & PELVIS W/O CONTRAST: CPT

## 2025-08-11 PROCEDURE — 74176 CT ABD & PELVIS W/O CONTRAST: CPT | Performed by: RADIOLOGY

## 2025-08-21 ENCOUNTER — APPOINTMENT (OUTPATIENT)
Facility: CLINIC | Age: 85
End: 2025-08-21
Payer: MEDICARE

## 2025-08-26 ENCOUNTER — APPOINTMENT (OUTPATIENT)
Dept: UROLOGY | Facility: CLINIC | Age: 85
End: 2025-08-26
Payer: MEDICARE

## 2025-08-26 VITALS
SYSTOLIC BLOOD PRESSURE: 147 MMHG | HEART RATE: 76 BPM | WEIGHT: 192.8 LBS | BODY MASS INDEX: 32.91 KG/M2 | HEIGHT: 64 IN | DIASTOLIC BLOOD PRESSURE: 91 MMHG

## 2025-08-26 DIAGNOSIS — N20.0 KIDNEY STONE: Primary | ICD-10-CM

## 2025-08-26 PROCEDURE — 99213 OFFICE O/P EST LOW 20 MIN: CPT | Performed by: STUDENT IN AN ORGANIZED HEALTH CARE EDUCATION/TRAINING PROGRAM

## 2025-08-26 PROCEDURE — 3080F DIAST BP >= 90 MM HG: CPT | Performed by: STUDENT IN AN ORGANIZED HEALTH CARE EDUCATION/TRAINING PROGRAM

## 2025-08-26 PROCEDURE — 1036F TOBACCO NON-USER: CPT | Performed by: STUDENT IN AN ORGANIZED HEALTH CARE EDUCATION/TRAINING PROGRAM

## 2025-08-26 PROCEDURE — 3077F SYST BP >= 140 MM HG: CPT | Performed by: STUDENT IN AN ORGANIZED HEALTH CARE EDUCATION/TRAINING PROGRAM

## 2025-08-26 PROCEDURE — G2211 COMPLEX E/M VISIT ADD ON: HCPCS | Performed by: STUDENT IN AN ORGANIZED HEALTH CARE EDUCATION/TRAINING PROGRAM

## 2025-08-26 PROCEDURE — 1159F MED LIST DOCD IN RCRD: CPT | Performed by: STUDENT IN AN ORGANIZED HEALTH CARE EDUCATION/TRAINING PROGRAM

## 2025-08-26 PROCEDURE — 1160F RVW MEDS BY RX/DR IN RCRD: CPT | Performed by: STUDENT IN AN ORGANIZED HEALTH CARE EDUCATION/TRAINING PROGRAM

## 2025-08-26 ASSESSMENT — ENCOUNTER SYMPTOMS
DEPRESSION: 0
OCCASIONAL FEELINGS OF UNSTEADINESS: 0
LOSS OF SENSATION IN FEET: 1

## 2025-09-05 DIAGNOSIS — E78.2 MIXED HYPERLIPIDEMIA: ICD-10-CM

## 2025-09-05 DIAGNOSIS — I10 BENIGN ESSENTIAL HYPERTENSION: ICD-10-CM

## 2025-09-05 RX ORDER — ATORVASTATIN CALCIUM 20 MG/1
20 TABLET, FILM COATED ORAL DAILY
Qty: 90 TABLET | Refills: 3 | Status: SHIPPED | OUTPATIENT
Start: 2025-09-05

## 2025-09-05 RX ORDER — ENALAPRIL MALEATE 10 MG/1
10 TABLET ORAL DAILY
Qty: 90 TABLET | Refills: 3 | Status: SHIPPED | OUTPATIENT
Start: 2025-09-05

## 2025-12-02 ENCOUNTER — APPOINTMENT (OUTPATIENT)
Dept: PRIMARY CARE | Facility: CLINIC | Age: 85
End: 2025-12-02
Payer: MEDICARE

## 2025-12-05 ENCOUNTER — APPOINTMENT (OUTPATIENT)
Dept: UROLOGY | Facility: CLINIC | Age: 85
End: 2025-12-05
Payer: MEDICARE

## 2026-01-02 ENCOUNTER — APPOINTMENT (OUTPATIENT)
Dept: PRIMARY CARE | Facility: CLINIC | Age: 86
End: 2026-01-02
Payer: MEDICARE

## 2026-08-27 ENCOUNTER — APPOINTMENT (OUTPATIENT)
Dept: UROLOGY | Facility: CLINIC | Age: 86
End: 2026-08-27
Payer: MEDICARE

## (undated) DEVICE — GLOVE, SURGICAL, PROTEXIS PI ORTHO, 7.0, PF, LF

## (undated) DEVICE — SHEATH, URETERAL ACCESS, NAVIGATOR 13/15FR X 36CM

## (undated) DEVICE — GLOVE, SURGICAL, PROTEXIS PI W/NEU-THERA, 7.5, PF, LF

## (undated) DEVICE — Y-ADAPTER, GATEWAY ADVANTAGE

## (undated) DEVICE — SOLUTION, IRRIGATION, SODIUM CHLORIDE 0.9%, 1000 ML, POUR BOTTLE

## (undated) DEVICE — GLOVE, SURGICAL, PROTEXIS PI MICRO, 7.5, PF, LF

## (undated) DEVICE — CATHETER, URETERAL, OLIVE TIP, 5 FR, 115 CM, POLYURETHANE

## (undated) DEVICE — SYRINGE, HYPODERMIC, CONTROL, LUER LOCK, 10 CC, PLASTIC, STERILE

## (undated) DEVICE — SHEATH, URETERAL ACCESS, NAVIGATOR 11/13FR X 36CM

## (undated) DEVICE — NEEDLE, HYPODERMIC, SPECIALTY, REGULAR WALL, SHORT BEVEL, 18 G X 1.5 IN

## (undated) DEVICE — SOLUTION, IRRIGATION, STERILE WATER, 1000 ML, POUR BOTTLE

## (undated) DEVICE — CATHETER, URETERAL, POLLACK, OPEN END, 5.5 FR, 70 CM

## (undated) DEVICE — Device

## (undated) DEVICE — DRAPE PACK, LAVH, W/ATTACHED LEGGINGS, W/POUCH, 100 X 114 IN, LF, STERILE

## (undated) DEVICE — SCOPE, LITHOVUE SUD ONLY, GLOBAL STANDARD

## (undated) DEVICE — TRAY, PARA/PUD BLOCK, W/10ML SYRINGE

## (undated) DEVICE — TOWEL PACK, STERILE, 4/PACK, BLUE

## (undated) DEVICE — GUIDEWIRE, DUAL SENSOR, .035 X 150 STRAIGHT,  3CM

## (undated) DEVICE — CATHETER, DUAL LUMEN, UDC/10/50 M

## (undated) DEVICE — HOLMIUM 200 FORTEC FIBER

## (undated) DEVICE — BASKET, STONE, RETRIEVAL, NITINOL (4WIRE, 1.9 0 TIP)

## (undated) DEVICE — DILATOR SET, COAXIAL STYLET, 8/10